# Patient Record
Sex: FEMALE | Race: WHITE | Employment: OTHER | URBAN - METROPOLITAN AREA
[De-identification: names, ages, dates, MRNs, and addresses within clinical notes are randomized per-mention and may not be internally consistent; named-entity substitution may affect disease eponyms.]

---

## 2021-04-18 ENCOUNTER — HOSPITAL ENCOUNTER (INPATIENT)
Age: 86
LOS: 7 days | Discharge: HOME | DRG: 377 | End: 2021-04-25
Attending: EMERGENCY MEDICINE | Admitting: INTERNAL MEDICINE
Payer: MEDICARE

## 2021-04-18 ENCOUNTER — APPOINTMENT (OUTPATIENT)
Dept: CT IMAGING | Age: 86
DRG: 377 | End: 2021-04-18
Attending: EMERGENCY MEDICINE
Payer: MEDICARE

## 2021-04-18 DIAGNOSIS — D64.9 ANEMIA, UNSPECIFIED TYPE: ICD-10-CM

## 2021-04-18 DIAGNOSIS — I44.1 AV BLOCK, 2ND DEGREE: ICD-10-CM

## 2021-04-18 DIAGNOSIS — K92.0 HEMATEMESIS, PRESENCE OF NAUSEA NOT SPECIFIED: ICD-10-CM

## 2021-04-18 DIAGNOSIS — I10 HTN (HYPERTENSION), BENIGN: ICD-10-CM

## 2021-04-18 DIAGNOSIS — K92.2 UPPER GI BLEED: Primary | ICD-10-CM

## 2021-04-18 PROBLEM — M19.90 ARTHRITIS: Status: ACTIVE | Noted: 2021-04-18

## 2021-04-18 PROBLEM — R63.6 UNDERWEIGHT: Status: ACTIVE | Noted: 2021-04-18

## 2021-04-18 PROBLEM — E03.9 HYPOTHYROIDISM: Status: ACTIVE | Noted: 2021-04-18

## 2021-04-18 LAB
ALBUMIN SERPL-MCNC: 2.1 G/DL (ref 3.5–5)
ALBUMIN/GLOB SERPL: 0.8 {RATIO} (ref 1.1–2.2)
ALP SERPL-CCNC: 87 U/L (ref 45–117)
ALT SERPL-CCNC: 15 U/L (ref 12–78)
ANION GAP SERPL CALC-SCNC: 3 MMOL/L (ref 5–15)
APTT PPP: 23.4 SEC (ref 22.1–31)
AST SERPL-CCNC: 12 U/L (ref 15–37)
BASOPHILS # BLD: 0 K/UL (ref 0–0.1)
BASOPHILS NFR BLD: 0 % (ref 0–1)
BILIRUB SERPL-MCNC: 0.2 MG/DL (ref 0.2–1)
BUN SERPL-MCNC: 37 MG/DL (ref 6–20)
BUN/CREAT SERPL: 41 (ref 12–20)
CALCIUM SERPL-MCNC: 7.6 MG/DL (ref 8.5–10.1)
CHLORIDE SERPL-SCNC: 107 MMOL/L (ref 97–108)
CO2 SERPL-SCNC: 29 MMOL/L (ref 21–32)
COMMENT, HOLDF: NORMAL
CREAT SERPL-MCNC: 0.91 MG/DL (ref 0.55–1.02)
DIFFERENTIAL METHOD BLD: ABNORMAL
EOSINOPHIL # BLD: 0 K/UL (ref 0–0.4)
EOSINOPHIL NFR BLD: 0 % (ref 0–7)
ERYTHROCYTE [DISTWIDTH] IN BLOOD BY AUTOMATED COUNT: 14.8 % (ref 11.5–14.5)
FOLATE SERPL-MCNC: 6.8 NG/ML (ref 5–21)
GLOBULIN SER CALC-MCNC: 2.8 G/DL (ref 2–4)
GLUCOSE SERPL-MCNC: 134 MG/DL (ref 65–100)
HCT VFR BLD AUTO: 19.8 % (ref 35–47)
HCT VFR BLD AUTO: 26.3 % (ref 35–47)
HEMOCCULT STL QL: NEGATIVE
HGB BLD-MCNC: 6.1 G/DL (ref 11.5–16)
HGB BLD-MCNC: 8 G/DL (ref 11.5–16)
HISTORY CHECKED?,CKHIST: NORMAL
IMM GRANULOCYTES # BLD AUTO: 0.1 K/UL (ref 0–0.04)
IMM GRANULOCYTES NFR BLD AUTO: 1 % (ref 0–0.5)
INR PPP: 1.1 (ref 0.9–1.1)
IRON SATN MFR SERPL: 11 % (ref 20–50)
IRON SERPL-MCNC: 24 UG/DL (ref 35–150)
LYMPHOCYTES # BLD: 0.7 K/UL (ref 0.8–3.5)
LYMPHOCYTES NFR BLD: 6 % (ref 12–49)
MCH RBC QN AUTO: 27.3 PG (ref 26–34)
MCHC RBC AUTO-ENTMCNC: 30.4 G/DL (ref 30–36.5)
MCV RBC AUTO: 89.8 FL (ref 80–99)
MONOCYTES # BLD: 0.5 K/UL (ref 0–1)
MONOCYTES NFR BLD: 4 % (ref 5–13)
NEUTS SEG # BLD: 11.1 K/UL (ref 1.8–8)
NEUTS SEG NFR BLD: 89 % (ref 32–75)
NRBC # BLD: 0 K/UL (ref 0–0.01)
NRBC BLD-RTO: 0 PER 100 WBC
PLATELET # BLD AUTO: 371 K/UL (ref 150–400)
PMV BLD AUTO: 8.4 FL (ref 8.9–12.9)
POTASSIUM SERPL-SCNC: 4.6 MMOL/L (ref 3.5–5.1)
PROT SERPL-MCNC: 4.9 G/DL (ref 6.4–8.2)
PROTHROMBIN TIME: 11.8 SEC (ref 9–11.1)
RBC # BLD AUTO: 2.93 M/UL (ref 3.8–5.2)
RBC MORPH BLD: ABNORMAL
SAMPLES BEING HELD,HOLD: NORMAL
SODIUM SERPL-SCNC: 139 MMOL/L (ref 136–145)
THERAPEUTIC RANGE,PTTT: NORMAL SECS (ref 58–77)
TIBC SERPL-MCNC: 226 UG/DL (ref 250–450)
TROPONIN I SERPL-MCNC: <0.05 NG/ML
TSH SERPL DL<=0.05 MIU/L-ACNC: 1.93 UIU/ML (ref 0.36–3.74)
VIT B12 SERPL-MCNC: 431 PG/ML (ref 193–986)
WBC # BLD AUTO: 12.4 K/UL (ref 3.6–11)

## 2021-04-18 PROCEDURE — 65660000000 HC RM CCU STEPDOWN

## 2021-04-18 PROCEDURE — 96374 THER/PROPH/DIAG INJ IV PUSH: CPT

## 2021-04-18 PROCEDURE — 99285 EMERGENCY DEPT VISIT HI MDM: CPT

## 2021-04-18 PROCEDURE — 86901 BLOOD TYPING SEROLOGIC RH(D): CPT

## 2021-04-18 PROCEDURE — C9113 INJ PANTOPRAZOLE SODIUM, VIA: HCPCS | Performed by: EMERGENCY MEDICINE

## 2021-04-18 PROCEDURE — 93005 ELECTROCARDIOGRAM TRACING: CPT

## 2021-04-18 PROCEDURE — 74011250636 HC RX REV CODE- 250/636: Performed by: EMERGENCY MEDICINE

## 2021-04-18 PROCEDURE — 84484 ASSAY OF TROPONIN QUANT: CPT

## 2021-04-18 PROCEDURE — 82746 ASSAY OF FOLIC ACID SERUM: CPT

## 2021-04-18 PROCEDURE — 85025 COMPLETE CBC W/AUTO DIFF WBC: CPT

## 2021-04-18 PROCEDURE — 77030038269 HC DRN EXT URIN PURWCK BARD -A

## 2021-04-18 PROCEDURE — 82272 OCCULT BLD FECES 1-3 TESTS: CPT

## 2021-04-18 PROCEDURE — 86923 COMPATIBILITY TEST ELECTRIC: CPT

## 2021-04-18 PROCEDURE — 85610 PROTHROMBIN TIME: CPT

## 2021-04-18 PROCEDURE — 81001 URINALYSIS AUTO W/SCOPE: CPT

## 2021-04-18 PROCEDURE — 80053 COMPREHEN METABOLIC PANEL: CPT

## 2021-04-18 PROCEDURE — 74011250636 HC RX REV CODE- 250/636: Performed by: INTERNAL MEDICINE

## 2021-04-18 PROCEDURE — 83540 ASSAY OF IRON: CPT

## 2021-04-18 PROCEDURE — 85730 THROMBOPLASTIN TIME PARTIAL: CPT

## 2021-04-18 PROCEDURE — 36415 COLL VENOUS BLD VENIPUNCTURE: CPT

## 2021-04-18 PROCEDURE — 85018 HEMOGLOBIN: CPT

## 2021-04-18 PROCEDURE — 82607 VITAMIN B-12: CPT

## 2021-04-18 PROCEDURE — C9113 INJ PANTOPRAZOLE SODIUM, VIA: HCPCS | Performed by: INTERNAL MEDICINE

## 2021-04-18 PROCEDURE — 84443 ASSAY THYROID STIM HORMONE: CPT

## 2021-04-18 PROCEDURE — 74177 CT ABD & PELVIS W/CONTRAST: CPT

## 2021-04-18 PROCEDURE — 74011000636 HC RX REV CODE- 636: Performed by: EMERGENCY MEDICINE

## 2021-04-18 PROCEDURE — 65270000029 HC RM PRIVATE

## 2021-04-18 RX ORDER — POLYETHYLENE GLYCOL 3350 17 G/17G
17 POWDER, FOR SOLUTION ORAL DAILY PRN
Status: DISCONTINUED | OUTPATIENT
Start: 2021-04-18 | End: 2021-04-25 | Stop reason: HOSPADM

## 2021-04-18 RX ORDER — SENNOSIDES 8.6 MG/1
1 TABLET ORAL DAILY PRN
Status: DISCONTINUED | OUTPATIENT
Start: 2021-04-18 | End: 2021-04-25 | Stop reason: HOSPADM

## 2021-04-18 RX ORDER — SODIUM CHLORIDE 9 MG/ML
250 INJECTION, SOLUTION INTRAVENOUS AS NEEDED
Status: DISCONTINUED | OUTPATIENT
Start: 2021-04-18 | End: 2021-04-25 | Stop reason: HOSPADM

## 2021-04-18 RX ORDER — ASPIRIN 81 MG/1
81 TABLET ORAL DAILY
COMMUNITY
End: 2021-04-25

## 2021-04-18 RX ORDER — SODIUM CHLORIDE 0.9 % (FLUSH) 0.9 %
5-40 SYRINGE (ML) INJECTION EVERY 8 HOURS
Status: DISCONTINUED | OUTPATIENT
Start: 2021-04-18 | End: 2021-04-25 | Stop reason: HOSPADM

## 2021-04-18 RX ORDER — PANTOPRAZOLE SODIUM 40 MG/10ML
40 INJECTION, POWDER, LYOPHILIZED, FOR SOLUTION INTRAVENOUS
Status: COMPLETED | OUTPATIENT
Start: 2021-04-18 | End: 2021-04-18

## 2021-04-18 RX ORDER — LEVOTHYROXINE SODIUM 50 UG/1
50 TABLET ORAL
Status: ON HOLD | COMMUNITY
End: 2021-04-23 | Stop reason: SDUPTHER

## 2021-04-18 RX ORDER — SODIUM CHLORIDE 0.9 % (FLUSH) 0.9 %
5-40 SYRINGE (ML) INJECTION AS NEEDED
Status: DISCONTINUED | OUTPATIENT
Start: 2021-04-18 | End: 2021-04-25 | Stop reason: HOSPADM

## 2021-04-18 RX ORDER — ONDANSETRON 2 MG/ML
4 INJECTION INTRAMUSCULAR; INTRAVENOUS
Status: COMPLETED | OUTPATIENT
Start: 2021-04-18 | End: 2021-04-18

## 2021-04-18 RX ORDER — ACETAMINOPHEN 650 MG/1
650 SUPPOSITORY RECTAL
Status: DISCONTINUED | OUTPATIENT
Start: 2021-04-18 | End: 2021-04-25 | Stop reason: HOSPADM

## 2021-04-18 RX ORDER — ACETAMINOPHEN 325 MG/1
650 TABLET ORAL
Status: DISCONTINUED | OUTPATIENT
Start: 2021-04-18 | End: 2021-04-25 | Stop reason: HOSPADM

## 2021-04-18 RX ORDER — DICLOFENAC POTASSIUM 50 MG/1
50 TABLET, FILM COATED ORAL DAILY
COMMUNITY
End: 2021-04-18

## 2021-04-18 RX ORDER — AMLODIPINE BESYLATE 5 MG/1
5 TABLET ORAL DAILY
COMMUNITY
End: 2021-04-25

## 2021-04-18 RX ORDER — PROMETHAZINE HYDROCHLORIDE 25 MG/1
12.5 TABLET ORAL
Status: DISCONTINUED | OUTPATIENT
Start: 2021-04-18 | End: 2021-04-25 | Stop reason: HOSPADM

## 2021-04-18 RX ORDER — ONDANSETRON 2 MG/ML
4 INJECTION INTRAMUSCULAR; INTRAVENOUS
Status: DISCONTINUED | OUTPATIENT
Start: 2021-04-18 | End: 2021-04-25 | Stop reason: HOSPADM

## 2021-04-18 RX ORDER — DICLOFENAC SODIUM 50 MG/1
50 TABLET, DELAYED RELEASE ORAL DAILY
COMMUNITY
End: 2021-04-25

## 2021-04-18 RX ADMIN — ONDANSETRON 4 MG: 2 INJECTION INTRAMUSCULAR; INTRAVENOUS at 14:04

## 2021-04-18 RX ADMIN — SODIUM CHLORIDE 40 MG: 9 INJECTION INTRAMUSCULAR; INTRAVENOUS; SUBCUTANEOUS at 21:34

## 2021-04-18 RX ADMIN — IOPAMIDOL 100 ML: 755 INJECTION, SOLUTION INTRAVENOUS at 12:14

## 2021-04-18 RX ADMIN — PANTOPRAZOLE SODIUM 40 MG: 40 INJECTION, POWDER, FOR SOLUTION INTRAVENOUS at 12:48

## 2021-04-18 RX ADMIN — SODIUM CHLORIDE 500 ML: 9 INJECTION, SOLUTION INTRAVENOUS at 11:32

## 2021-04-18 RX ADMIN — Medication 10 ML: at 21:35

## 2021-04-18 NOTE — H&P
[facility]  [address], [facility]  [phone]    Admission History and Physical      NAME:       Juju Goodwin   :       1930   MRN:      [id number]     PCP:      Yoni Mata MD     Date of service:   2021     Chief  Complaint:  Coffee ground emesis     History Of Presenting Illness:       Ms. Chinedu Zarate is a 80 y.o. female who is being admitted for Acute GI bleeding. Ms. Chinedu Zarate presented to our Emergency Department today complaining of persistent episodes of coffee ground emesis. She is a poor historian and so I discussed with her son Chai Smith for collaborative Hx. She has a hx of arthritis for which she has been taking NSAIDs. It is unclear if she has ever had an EGD or colonoscopy as she just recently moved here from [place] to stay with her family. In the ED, she was found to be frail Denies much abdominal pain. No cough or SOB. No fever or chills. CT scan abdomen and pelvis showed gastric distention. She will be admitted for further management. Allergies   Allergen Reactions    Pcn [Penicillins] Unknown (comments)    Statins-Hmg-Coa Reductase Inhibitors Unknown (comments)    Warfarin Unknown (comments)       Prior to Admission medications    Medication Sig Start Date End Date Taking? Authorizing Provider   amLODIPine (NORVASC) 5 mg tablet Take 5 mg by mouth daily. Yes Vania, MD Haven   diclofenac potassium (CATAFLAM) 50 mg tablet Take 50 mg by mouth daily. Yes Vania, MD Haven   levothyroxine sodium (LEVOTHYROXINE PO) Take 50 mcg by mouth Daily (before breakfast). Yes Vania, MD Haven   aspirin delayed-release 81 mg tablet Take 81 mg by mouth daily.    Yes Vania, MD Haven       Past Medical History:   Diagnosis Date    Arthritis     Hypertension     Hypothyroidism         Past Surgical History:   Procedure Laterality Date    HX CHOLECYSTECTOMY      HX GYN      partial hysterectomy, uterine prolapse    HX HEENT      cataract    HX ORTHOPAEDIC Right     knee replacement       Social History     Tobacco Use    Smoking status: Never Smoker   Substance Use Topics    Alcohol use: Not Currently        Family History   Problem Relation Age of Onset    Heart Disease Neg Hx         Review of Systems: as per family    Constitutional ROS: no fever, chills, rigors or night sweats  Respiratory ROS: no cough, sputum, hemoptysis or pleuritic pain. Cardiovascular ROS: no chest pain, palpitations, orthopnea, PND or syncope  Endocrine ROS: no polydispsia, polyuria, heat or cold intolerance or major weight change. Gastrointestinal ROS: no dysphagia, abdominal pain but nausea, vomiting     Genito-Urinary ROS: no dysuria, frequency, hematuria, retention or flank pain  Musculoskeletal ROS: no swelling or muscular tenderness  Neurological ROS: no headache, confusion, focal weakness   Psychiatric ROS: no depression, anxiety, mood swings  Dermatological ROS: no rash, pruritis, or urticaria  Heme-Lymph ROS: no swollen glands, bleeding    Examination:    Constitutional:    Visit Vitals  /83   Pulse (!) 105   Temp 97.4 °F (36.3 °C)   Resp 22   Ht 4' 8\" (1.422 m)   Wt 36.3 kg (80 lb)   SpO2 96%   BMI 17.94 kg/m²         General:  Weak, cachectic looking elderly patient, in no acute distress  Eyes: Pale conjunctivae, PERRLA with no discharge. Normal eye movements  Ear, Nose, Mouth & Throat: No ottorrhea, rhinorrhea, non tender sinuses, dry mucous membranes  Respiratory:  No accessory muscle use, clear breath sounds without crackles or wheezes  Cardiovascular:  No JVD or murmurs, regular and normal S1, S2 without thrills, bruits. + peripheral edema. GI & :  Soft abdomen, non-tender, non-distended, normoactive bowel sounds with no palpable organomegaly  Heme:  No cervical or axillary adenopathy.    Musculoskeletal:  No cyanosis, clubbing but generalized muscular atrophy   Skin:  No rashes, bruising or ulcers   Neurological: Awake and alert, speech is clear but slow. Generally frail but no focal deficits   Psychiatric:  Has little insight to her illness   ________________________________________________________________________    Data Review:    Labs:    Recent Labs     04/18/21  1126   WBC 12.4*   HGB 8.0*   HCT 26.3*        Recent Labs     04/18/21  1126      K 4.6      CO2 29   *   BUN 37*   CREA 0.91   CA 7.6*   ALB 2.1*   ALT 15     No components found for: GLPOC  No results for input(s): PH, PCO2, PO2, HCO3, FIO2 in the last 72 hours. Recent Labs     04/18/21  1126   INR 1.1       Imaging Studies:      CT scan abdomen and pelvis -  Reviewed     I have also reviewed available old medical records. Assessment & Impression:     Ms. Chinedu Zarate is a 80 y.o. female being evaluated for:     Principal Problem:    Acute GI bleeding (4/18/2021)    Active Problems:    Coffee ground emesis (4/18/2021)      HTN (hypertension), benign (4/18/2021)      Hypothyroidism (4/18/2021)      Arthritis (4/18/2021)      Underweight (4/18/2021)         Plan of management:    Acute GI bleeding (4/18/2021) / Coffee ground emesis (4/18/2021) POA: likely due to NSAIDs induced PUD. However, given her age, gastric distension noted on CT, there is concern for a gastric outlet obstruction vs neoplasm. Admit to hospital. Clears for now. IV fluids. Start IV PPi. Hold NSAIDs. Consult Gi for further testing. HTN (hypertension), benign (4/18/2021) POA: Hold BP medications for now given likely drop if she continued to bleed. IV Hydralazine PRN    Hypothyroidism (4/18/2021) POA: check a TSH. Resume Levothyroxine once verified    Anemia POA: unclear baseline. Likely due to blood loss. Check serologies. Monitor Hgb/Hct q8hr. Transfuse to keep it >7.  Son has given consent    Arthritis (4/18/2021) POA: Tylenol PRN    Underweight (4/18/2021) POA: likely from advanced age, poor intake vs other.  Diet as tolerated    Code Status:  Full    Surrogate decision maker: Family    Risk of deterioration: high      Total time spent for the care of the patient: [address] [facility] discussed with: Patient, Family, Nursing Staff and ED physician    Discussed:  Code Status, Care Plan and D/C Planning    Prophylaxis:  SCD's and H2B/PPI    Probable Disposition:  Home w/Family           ___________________________________________________    Attending Physician: Luke Mcgill MD

## 2021-04-18 NOTE — ROUTINE PROCESS
TRANSFER - OUT REPORT: 
 
Verbal report given to Jeremie Yakov (name) on [facility]  being transferred to [facility] (unit) for routine progression of care Report consisted of patients Situation, Background, Assessment and  
Recommendations(SBAR). Information from the following report(s) SBAR, ED Summary, MAR, Recent Results, Med Rec Status and Cardiac Rhythm a fib was reviewed with the receiving nurse. Lines:  
Peripheral IV 04/18/21 Left Forearm (Active) Peripheral IV 04/18/21 Right Forearm (Active) Site Assessment Clean, dry, & intact 04/18/21 1502 Phlebitis Assessment 0 04/18/21 1502 Infiltration Assessment 0 04/18/21 1502 Opportunity for questions and clarification was provided. Patient transported with: 
 [employer]

## 2021-04-18 NOTE — ED PROVIDER NOTES
Ms. Alba Mo is a 79yo female who presents to the ER with complaints of coffee-ground emesis. Per EMS, she recently moved from [place] to live with her family. Said having vomiting earlier today. She describes the vomit as being black and looking like coffee grounds. She does not know if she is on any blood thinners. She reports that she had some mild epigastric pain but that pain seems to resolve. No chest pain or trouble breathing. She said that she has some ongoing pain in her left foot, which has been chronic for her. She denies similar symptoms in the past.           No past medical history on file. No past surgical history on file. No family history on file.     Social History     Socioeconomic History    Marital status: Not on file     Spouse name: Not on file    Number of children: Not on file    Years of education: Not on file    Highest education level: Not on file   Occupational History    Not on file   Social Needs    Financial resource strain: Not on file    Food insecurity     Worry: Not on file     Inability: Not on file    Transportation needs     Medical: Not on file     Non-medical: Not on file   Tobacco Use    Smoking status: Not on file   Substance and Sexual Activity    Alcohol use: Not on file    Drug use: Not on file    Sexual activity: Not on file   Lifestyle    Physical activity     Days per week: Not on file     Minutes per session: Not on file    Stress: Not on file   Relationships    Social connections     Talks on phone: Not on file     Gets together: Not on file     Attends Muslim service: Not on file     Active member of club or organization: Not on file     Attends meetings of clubs or organizations: Not on file     Relationship status: Not on file    Intimate partner violence     Fear of current or ex partner: Not on file     Emotionally abused: Not on file     Physically abused: Not on file     Forced sexual activity: Not on file   Other Topics Concern    Not on file   Social History Narrative    Not on file         ALLERGIES: Pcn [penicillins]    Review of Systems   Constitutional: Negative for chills and fever. HENT: Negative for rhinorrhea and sore throat. Respiratory: Negative for cough and shortness of breath. Cardiovascular: Negative for chest pain. Gastrointestinal: Positive for abdominal pain and vomiting. Genitourinary: Negative for dysuria and urgency. Musculoskeletal:        Left foot pain   Skin: Negative for rash. Neurological: Negative for dizziness, weakness and light-headedness. Vitals:    04/18/21 1051 04/18/21 1056   BP: (!) 140/63    Pulse: 97    Resp: 20    Temp: 97.4 °F (36.3 °C)    SpO2:  97%   Weight: 36.3 kg (80 lb)    Height: 4' 8\" (1.422 m)             Physical Exam       Vital signs reviewed. Nursing notes reviewed. Const:  No acute distress, well developed, well nourished  Head:  Atraumatic, normocephalic  Eyes:  PERRL, conjunctiva normal, no scleral icterus  Neck:  Supple, trachea midline  Cardiovascular: Regular rate  Resp:  No resp distress, no increased work of breathing  Abd:  Soft, mild suprapubic tenderness, non-distended, no rebound, no guarding, no CVA tenderness  : Normal external genitalia, brown stool  MSK:  No pedal edema, normal ROM  Neuro:  Alert and awake, no cranial nerve defect  Skin:  Warm, dry, intact  Psych: normal mood and affect, behavior is normal, judgement and thought content is normal            MDM  Number of Diagnoses or Management Options     Amount and/or Complexity of Data Reviewed  Clinical lab tests: ordered and reviewed  Tests in the radiology section of CPT®: ordered and reviewed  Review and summarize past medical records: yes    Patient Progress  Patient progress: stable          Ms. Linus Capps is a 79yo female who presents to the ER with vomiting coffee ground emesis. Her hemoccult is negative, but she has continued to have the vomiting here.   Her Hg is 8, however, I do not have a baseline Hg. CT shows gastric distension but no other acute pathology. Pt. To be evaluated for admission by the hospitalist.        [address] [facility] for Admission  1:01 PM    ED Room Number: ER09/09  Patient Name and age:  Martha Winters 80 y.o.  female  Working Diagnosis:   1. Upper GI bleed    2. Hematemesis, presence of nausea not specified    3.  Anemia, unspecified type        COVID-19 Suspicion:  no  Sepsis present:  no  Reassessment needed: no  Code Status:  Full Code  Readmission: no  Isolation Requirements:  no  Recommended Level of Care:  telemetry  Department:[facility] ED - [phone]        Procedures

## 2021-04-18 NOTE — PROGRESS NOTES
[facility]: MED RECONCILIATION    Comments/Recommendations:   Medication reconciliation completed by son who had medication list to review. Patient has not had any medications today prior to hospital arrival.   Confirmed preferred pharmacy as [facility] and [address] [place] [address]. Medications added:     none    Medications removed:    none    Medications adjusted:    none    Information obtained from: son, medication list       Allergies: Pcn [penicillins], Statins-hmg-coa reductase inhibitors, and Warfarin    Prior to Admission Medications:     Medication Documentation Review Audit       Reviewed by Yolanda Morgan (Pharmacist) on 04/18/21 at 1522      Medication Sig Documenting Provider Last Dose Status Taking? amLODIPine (NORVASC) 5 mg tablet Take 5 mg by mouth daily. OtherHaven MD 4/17/2021 AM Active Yes   aspirin delayed-release 81 mg tablet Take 81 mg by mouth daily. Haven Caro MD 4/17/2021 AM Active Yes   diclofenac EC (VOLTAREN) 50 mg EC tablet Take 50 mg by mouth daily. Provider, Historical 4/17/2021 AM Active Yes   levothyroxine (SYNTHROID) 50 mcg tablet Take 50 mcg by mouth Daily (before breakfast).  Haven Caro MD 4/17/2021 AM Active Yes                    Thank you,   Yolanda Ceja, PharmD, BCPS   Contact: [phone]

## 2021-04-18 NOTE — CONSULTS
[facility]. Herlinda Yap MD  [phone] office  [phone] voicemail   Gastroenterology Consultation Note      Admit Date: 4/18/2021  Consult Date: 4/18/2021   I appreciate your asking me to see Rhianna Woodruff, thank you very much for the opportunity to participate in her care. Narrative Assessment and Plan   Low-grade UGI bleeding in the form of CGE. HD stable. DDx: erosive esophagitis, PUD, AVMs, neoplasm. See plan below    Subjective:     Chief Complaint: Vomiting  Coffee ground emesis    History of Present Illness:   79 y/o woman who presented to ED for coffee-ground emesis for the past few days. Pt is a poor historian, and history was obtained from the chart. No h/o mireya red blood in vomiting. She denies any abdominal pain, and is currently not nauseous. No prior EGD. Recent h/o NSAID use is present to treat arthritic pain. CT scan abdomen and pelvis showed gastric distention. PCP:  Vania, MD Haven    Past Medical History:   Diagnosis Date    Arthritis     Hypertension     Hypothyroidism         Past Surgical History:   Procedure Laterality Date    HX CHOLECYSTECTOMY      HX GYN      partial hysterectomy, uterine prolapse    HX HEENT      cataract    HX ORTHOPAEDIC Right     knee replacement       Social History     Tobacco Use    Smoking status: Never Smoker   Substance Use Topics    Alcohol use: Not Currently        Family History   Problem Relation Age of Onset    Heart Disease Neg Hx         Allergies   Allergen Reactions    Pcn [Penicillins] Unknown (comments)    Statins-Hmg-Coa Reductase Inhibitors Unknown (comments)    Warfarin Unknown (comments)            Home Medications:  Prior to Admission Medications   Prescriptions Last Dose Informant Patient Reported? Taking? amLODIPine (NORVASC) 5 mg tablet 4/17/2021 at AM Child Yes Yes   Sig: Take 5 mg by mouth daily.    aspirin delayed-release 81 mg tablet 4/17/2021 at AM Child Yes Yes   Sig: Take 81 mg by mouth daily. diclofenac EC (VOLTAREN) 50 mg EC tablet 4/17/2021 at AM Child Yes Yes   Sig: Take 50 mg by mouth daily. levothyroxine (SYNTHROID) 50 mcg tablet 4/17/2021 at AM Child Yes Yes   Sig: Take 50 mcg by mouth Daily (before breakfast). Facility-Administered Medications: None       Hospital Medications:  Current Facility-Administered Medications   Medication Dose Route Frequency    sodium chloride (NS) flush 5-40 mL  5-40 mL IntraVENous Q8H    sodium chloride (NS) flush 5-40 mL  5-40 mL IntraVENous PRN    acetaminophen (TYLENOL) tablet 650 mg  650 mg Oral Q6H PRN    Or    acetaminophen (TYLENOL) suppository 650 mg  650 mg Rectal Q6H PRN    polyethylene glycol (MIRALAX) packet 17 g  17 g Oral DAILY PRN    senna (SENOKOT) tablet 8.6 mg  1 Tab Oral DAILY PRN    promethazine (PHENERGAN) tablet 12.5 mg  12.5 mg Oral Q6H PRN    Or    ondansetron (ZOFRAN) injection 4 mg  4 mg IntraVENous Q6H PRN    pantoprazole (PROTONIX) 40 mg in 0.9% sodium chloride 10 mL injection  40 mg IntraVENous Q12H       Review of Systems: Admission ROS by Luis A Alberts MD from 4/18/2021 were reviewed with the patient and changes (other than per HPI) include: none      Objective:     Physical Exam:  Visit Vitals  /68   Pulse 94   Temp 98 °F (36.7 °C)   Resp 16   Ht 4' 8\" (1.422 m)   Wt 36.3 kg (80 lb)   SpO2 95%   BMI 17.94 kg/m²     SpO2 Readings from Last 6 Encounters:   04/18/21 95%        No intake or output data in the 24 hours ending 04/18/21 9522   General: no distress, comfortable. Thin built. Frail. No acute distress.    Skin:  No rash or palpable dermatologic mass lesions  HEENT: Pupils equal, sclera anicteric, oropharynx with no gross lesions  Cardiovascular: No abnormal audible heart sounds, well perfused, no edema  Respiratory:  No abnormal audible breath sounds, normal respiratory effort, no throacic deformity  GI:  Soft abdomen, Abdomen nondistended, nontender, no mass, no free fluid, no rebound or guarding. Neurological:  Motor intact in upper extremeties      Laboratory:    Recent Results (from the past 24 hour(s))   SAMPLES BEING HELD    Collection Time: 04/18/21 11:26 AM   Result Value Ref Range    SAMPLES BEING HELD 1RED,1SST,1DRK GRN     COMMENT        Add-on orders for these samples will be processed based on acceptable specimen integrity and analyte stability, which may vary by analyte. METABOLIC PANEL, COMPREHENSIVE    Collection Time: 04/18/21 11:26 AM   Result Value Ref Range    Sodium 139 136 - 145 mmol/L    Potassium 4.6 3.5 - 5.1 mmol/L    Chloride 107 97 - 108 mmol/L    CO2 29 21 - 32 mmol/L    Anion gap 3 (L) 5 - 15 mmol/L    Glucose 134 (H) 65 - 100 mg/dL    BUN 37 (H) 6 - 20 MG/DL    Creatinine 0.91 0.55 - 1.02 MG/DL    BUN/Creatinine ratio 41 (H) 12 - 20      GFR est AA >60 >60 ml/min/1.73m2    GFR est non-AA 58 (L) >60 ml/min/1.73m2    Calcium 7.6 (L) 8.5 - 10.1 MG/DL    Bilirubin, total 0.2 0.2 - 1.0 MG/DL    ALT (SGPT) 15 12 - 78 U/L    AST (SGOT) 12 (L) 15 - 37 U/L    Alk. phosphatase 87 45 - 117 U/L    Protein, total 4.9 (L) 6.4 - 8.2 g/dL    Albumin 2.1 (L) 3.5 - 5.0 g/dL    Globulin 2.8 2.0 - 4.0 g/dL    A-G Ratio 0.8 (L) 1.1 - 2.2     CBC WITH AUTOMATED DIFF    Collection Time: 04/18/21 11:26 AM   Result Value Ref Range    WBC 12.4 (H) 3.6 - 11.0 K/uL    RBC 2.93 (L) 3.80 - 5.20 M/uL    HGB 8.0 (L) 11.5 - 16.0 g/dL    HCT 26.3 (L) 35.0 - 47.0 %    MCV 89.8 80.0 - 99.0 FL    MCH 27.3 26.0 - 34.0 PG    MCHC 30.4 30.0 - 36.5 g/dL    RDW 14.8 (H) 11.5 - 14.5 %    PLATELET 662 441 - 125 K/uL    MPV 8.4 (L) 8.9 - 12.9 FL    NRBC 0.0 0  WBC    ABSOLUTE NRBC 0.00 0.00 - 0.01 K/uL    NEUTROPHILS 89 (H) 32 - 75 %    LYMPHOCYTES 6 (L) 12 - 49 %    MONOCYTES 4 (L) 5 - 13 %    EOSINOPHILS 0 0 - 7 %    BASOPHILS 0 0 - 1 %    IMMATURE GRANULOCYTES 1 (H) 0.0 - 0.5 %    ABS. NEUTROPHILS 11.1 (H) 1.8 - 8.0 K/UL    ABS. LYMPHOCYTES 0.7 (L) 0.8 - 3.5 K/UL    ABS.  MONOCYTES 0.5 0.0 - 1.0 K/UL ABS. EOSINOPHILS 0.0 0.0 - 0.4 K/UL    ABS. BASOPHILS 0.0 0.0 - 0.1 K/UL    ABS. IMM. GRANS. 0.1 (H) 0.00 - 0.04 K/UL    DF SMEAR SCANNED      RBC COMMENTS HYPOCHROMIA  1+        RBC COMMENTS IAN CELLS  PRESENT        RBC COMMENTS RBC FRAGMENTS     PTT    Collection Time: 04/18/21 11:26 AM   Result Value Ref Range    aPTT 23.4 22.1 - 31.0 sec    aPTT, therapeutic range     58.0 - 77.0 SECS   PROTHROMBIN TIME + INR    Collection Time: 04/18/21 11:26 AM   Result Value Ref Range    INR 1.1 0.9 - 1.1      Prothrombin time 11.8 (H) 9.0 - 11.1 sec   TROPONIN I    Collection Time: 04/18/21 11:26 AM   Result Value Ref Range    Troponin-I, Qt. <0.05 <0.05 ng/mL   OCCULT BLOOD, STOOL    Collection Time: 04/18/21 11:26 AM   Result Value Ref Range    Occult blood, stool Negative NEG     TSH 3RD GENERATION    Collection Time: 04/18/21 11:26 AM   Result Value Ref Range    TSH 1.93 0.36 - 3.74 uIU/mL   IRON PROFILE    Collection Time: 04/18/21 11:26 AM   Result Value Ref Range    Iron 24 (L) 35 - 150 ug/dL    TIBC 226 (L) 250 - 450 ug/dL    Iron % saturation 11 (L) 20 - 50 %   VITAMIN B12    Collection Time: 04/18/21 11:26 AM   Result Value Ref Range    Vitamin B12 431 193 - 986 pg/mL   FOLATE    Collection Time: 04/18/21 11:26 AM   Result Value Ref Range    Folate 6.8 5.0 - 21.0 ng/mL         Assessment/Plan:     Principal Problem:    Acute GI bleeding (4/18/2021)    Active Problems:    Coffee ground emesis (4/18/2021)      HTN (hypertension), benign (4/18/2021)      Hypothyroidism (4/18/2021)      Arthritis (4/18/2021)      Underweight (4/18/2021)       Low-grade UGI bleeding in the form of CGE. HD stable. DDx: erosive esophagitis, PUD, AVMs, neoplasm. Normocytic anemia, without baseline for comparison. Iron studies suggestive of anemia related to chronic disease  Does not appear to be bleeding actively. No emergent need for endoscopy. PLAN:  - IV PPI  - CLD  - Monitor clinical progress.  EGD can be considered if there is concern for ongoing GI bleeding. Risks related to advanced age and frailty will need to be weighed against potential benefit.

## 2021-04-18 NOTE — ACP (ADVANCE CARE PLANNING)
Advance Care Planning Note    Name:                 Lisset Calloway  YOB: 1930  MRN:                  [id number]  Admission Date: 4/18/2021    Date of service: 4/18/2021    Active Diagnoses:    Principal Problem:    Acute GI bleeding (4/18/2021)    Coffee ground emesis (4/18/2021)    HTN (hypertension), benign (4/18/2021)    Hypothyroidism (4/18/2021)    Arthritis (4/18/2021)    Underweight (4/18/2021)    These active diagnoses are of sufficient risk that focused discussion on advance care planning is indicated in order to allow the patient to thoughtfully consider personal goals of care, and if situations arise that prevent the ability to personally give input, to ensure appropriate representation of their personal desires for different levels and aggressiveness of care. Discussion:     Persons present and participating in discussion: Lisset Calloway, Son Lesley Brush, Margarito Ferguson MD    Discussion: Given the patient's frail nature and current active issues needing admission to hospital and potential to worsen, I did discuss with the patient's son about her advanced medical directives. He confirmed that she would want attempts for restorative therapy and should she code, she would prefer CPR, intubations and shocking. However, in the even that this does happen, she would not prefer to be on supportive care for a prolonged period of time in which instance, Mr Carolynn Méndez and family would determine goals of care. She remains FULL code. Time Spent:     Total time spent face-to-face in education and discussion: 16 minutes.      Margarito Ferguson MD  4/18/2021  3:07 PM

## 2021-04-19 LAB
ALBUMIN SERPL-MCNC: 2 G/DL (ref 3.5–5)
ALBUMIN/GLOB SERPL: 0.9 {RATIO} (ref 1.1–2.2)
ALP SERPL-CCNC: 68 U/L (ref 45–117)
ALT SERPL-CCNC: 12 U/L (ref 12–78)
ANION GAP SERPL CALC-SCNC: 4 MMOL/L (ref 5–15)
APPEARANCE UR: ABNORMAL
AST SERPL-CCNC: 13 U/L (ref 15–37)
ATRIAL RATE: 104 BPM
BACTERIA URNS QL MICRO: ABNORMAL /HPF
BILIRUB SERPL-MCNC: 0.4 MG/DL (ref 0.2–1)
BILIRUB UR QL: NEGATIVE
BUN SERPL-MCNC: 30 MG/DL (ref 6–20)
BUN/CREAT SERPL: 48 (ref 12–20)
CALCIUM SERPL-MCNC: 7.9 MG/DL (ref 8.5–10.1)
CALCULATED R AXIS, ECG10: -66 DEGREES
CALCULATED T AXIS, ECG11: 85 DEGREES
CHLORIDE SERPL-SCNC: 111 MMOL/L (ref 97–108)
CO2 SERPL-SCNC: 28 MMOL/L (ref 21–32)
COLOR UR: ABNORMAL
CREAT SERPL-MCNC: 0.62 MG/DL (ref 0.55–1.02)
DIAGNOSIS, 93000: NORMAL
EPITH CASTS URNS QL MICRO: ABNORMAL /LPF
GLOBULIN SER CALC-MCNC: 2.2 G/DL (ref 2–4)
GLUCOSE SERPL-MCNC: 76 MG/DL (ref 65–100)
GLUCOSE UR STRIP.AUTO-MCNC: NEGATIVE MG/DL
HCT VFR BLD AUTO: 28 % (ref 35–47)
HGB BLD-MCNC: 8.6 G/DL (ref 11.5–16)
HGB UR QL STRIP: NEGATIVE
KETONES UR QL STRIP.AUTO: NEGATIVE MG/DL
LEUKOCYTE ESTERASE UR QL STRIP.AUTO: ABNORMAL
NITRITE UR QL STRIP.AUTO: POSITIVE
PH UR STRIP: 5 [PH] (ref 5–8)
POTASSIUM SERPL-SCNC: 4.7 MMOL/L (ref 3.5–5.1)
PROT SERPL-MCNC: 4.2 G/DL (ref 6.4–8.2)
PROT UR STRIP-MCNC: NEGATIVE MG/DL
Q-T INTERVAL, ECG07: 356 MS
QRS DURATION, ECG06: 104 MS
QTC CALCULATION (BEZET), ECG08: 435 MS
RBC #/AREA URNS HPF: ABNORMAL /HPF (ref 0–5)
SODIUM SERPL-SCNC: 143 MMOL/L (ref 136–145)
SP GR UR REFRACTOMETRY: 1.01 (ref 1–1.03)
UROBILINOGEN UR QL STRIP.AUTO: 0.2 EU/DL (ref 0.2–1)
VENTRICULAR RATE, ECG03: 90 BPM
WBC URNS QL MICRO: ABNORMAL /HPF (ref 0–4)

## 2021-04-19 PROCEDURE — 97161 PT EVAL LOW COMPLEX 20 MIN: CPT

## 2021-04-19 PROCEDURE — 74011250636 HC RX REV CODE- 250/636: Performed by: INTERNAL MEDICINE

## 2021-04-19 PROCEDURE — 36415 COLL VENOUS BLD VENIPUNCTURE: CPT

## 2021-04-19 PROCEDURE — 36430 TRANSFUSION BLD/BLD COMPNT: CPT

## 2021-04-19 PROCEDURE — C9113 INJ PANTOPRAZOLE SODIUM, VIA: HCPCS | Performed by: INTERNAL MEDICINE

## 2021-04-19 PROCEDURE — 97535 SELF CARE MNGMENT TRAINING: CPT

## 2021-04-19 PROCEDURE — 85018 HEMOGLOBIN: CPT

## 2021-04-19 PROCEDURE — 74011000250 HC RX REV CODE- 250: Performed by: INTERNAL MEDICINE

## 2021-04-19 PROCEDURE — 97165 OT EVAL LOW COMPLEX 30 MIN: CPT

## 2021-04-19 PROCEDURE — P9016 RBC LEUKOCYTES REDUCED: HCPCS

## 2021-04-19 PROCEDURE — 97116 GAIT TRAINING THERAPY: CPT

## 2021-04-19 PROCEDURE — 30233N1 TRANSFUSION OF NONAUTOLOGOUS RED BLOOD CELLS INTO PERIPHERAL VEIN, PERCUTANEOUS APPROACH: ICD-10-PCS | Performed by: INTERNAL MEDICINE

## 2021-04-19 PROCEDURE — 2709999900 HC NON-CHARGEABLE SUPPLY

## 2021-04-19 PROCEDURE — 80053 COMPREHEN METABOLIC PANEL: CPT

## 2021-04-19 PROCEDURE — 65660000000 HC RM CCU STEPDOWN

## 2021-04-19 RX ORDER — CYANOCOBALAMIN 1000 UG/ML
1000 INJECTION, SOLUTION INTRAMUSCULAR; SUBCUTANEOUS ONCE
Status: COMPLETED | OUTPATIENT
Start: 2021-04-19 | End: 2021-04-19

## 2021-04-19 RX ADMIN — Medication 10 ML: at 12:50

## 2021-04-19 RX ADMIN — Medication 10 ML: at 05:04

## 2021-04-19 RX ADMIN — Medication 10 ML: at 21:08

## 2021-04-19 RX ADMIN — Medication 10 ML: at 16:29

## 2021-04-19 RX ADMIN — SODIUM CHLORIDE 40 MG: 9 INJECTION INTRAMUSCULAR; INTRAVENOUS; SUBCUTANEOUS at 21:08

## 2021-04-19 RX ADMIN — CYANOCOBALAMIN 1000 MCG: 1000 INJECTION, SOLUTION INTRAMUSCULAR; SUBCUTANEOUS at 12:50

## 2021-04-19 RX ADMIN — SODIUM CHLORIDE 40 MG: 9 INJECTION INTRAMUSCULAR; INTRAVENOUS; SUBCUTANEOUS at 09:30

## 2021-04-19 RX ADMIN — IRON SUCROSE 100 MG: 20 INJECTION, SOLUTION INTRAVENOUS at 12:50

## 2021-04-19 NOTE — PROGRESS NOTES
Comprehensive Nutrition Assessment      Type and Reason for Visit: Initial, Positive nutrition screen    Nutrition Recommendations/Plan:   1. Continue NPO- advance diet as able to GI lite, or Low fiber if appropriate. Consider regular if goals of care are for pt to have more choices. 2. Add Ensure Compact TID when diet starts    3. Monitor Plan of care. Daily weights. PO intake (when diet starts). Nutrition Assessment:       Pt admitted for Coffee ground emesis [K92.0]. Pt  has a past medical history of Arthritis, Hypertension, and Hypothyroidism. Pt screened 2/2 low BMI. No wt hx in chart. Pt does not seem to be a good historian, could not obtain detailed diet/wt hx from pt. Called family listed in demographics. No answer at this time. Pt is currently NPO. No reports of n/v currently. No abdominal pain. Last BM 4/17. NKFA. Pt with recent NSAID use for arthritis per reports. No reported visible blood loss. GI holding EGD until goals of care defined. No reported chew/swallow difficulties. Pt was on regular texture PTA. Recommend ONS TID Ensure compact of concentrated kcal/protein if pt agreeable. Monitor trial of Compact TID. Wt Readings from Last 10 Encounters:   04/18/21 36.3 kg (80 lb)       Malnutrition Assessment:  Malnutrition Status: At risk for malnutrition (specify)    Context:   elderly, underweight, NPO status, possible GI bleed. Estimated Daily Nutrient Needs:  Energy (kcal):  1265 (BMR x 1.2) +500  Protein (g):  44       Fluid (ml/day):  1250    Documented meal intake:   Patient Vitals for the past 168 hrs:   % Diet Eaten   04/19/21 0919 0   04/18/21 1851 1 - 25%       Documented Supplement intake:  Patient Vitals for the past 168 hrs:   Supplement intake %   04/19/21 0919 0       Nutrition Related Findings:      last BM 4/17- loose, 1+ edema BLE. Nutritionally Significant Medications:   Protonix. PRN: miralax, Zofran, senna.        Wounds:    None       Current Nutrition Therapies:  DIET NPO    Anthropometric Measures:  · Height:  4' 7.98\" (142.2 cm)  · Current Body Wt:  36.3 kg (80 lb 0.4 oz)   · Admission Body Wt:   36.3kg    · Usual Body Wt:   ?     · Ideal Body Wt:  80 lbs:  100 %   · BMI Category:  Underweight (BMI less than 22) age over 72       Nutrition Diagnosis:   · Inadequate protein-energy intake related to altered GI function as evidenced by NPO or clear liquid status due to medical condition(GI bleed)      Nutrition Interventions:   Food and/or Nutrient Delivery: Start oral diet, Start oral nutrition supplement  Nutrition Education and Counseling: No recommendations at this time  Coordination of Nutrition Care: Continue to monitor while inpatient, Interdisciplinary rounds    Goals:  Diet advancement and PO tolerance >25% of meals and 50% ONS within 2-4 days       Nutrition Monitoring and Evaluation:   Behavioral-Environmental Outcomes: Beliefs and attitudes  Food/Nutrient Intake Outcomes: Food and nutrient intake, Supplement intake  Physical Signs/Symptoms Outcomes: Biochemical data, Weight, Hemodynamic status    Discharge Planning:    Continue oral nutrition supplement, Continue current diet     Electronically signed by Carmen Kyle RD     Contact: [phone]

## 2021-04-19 NOTE — PROGRESS NOTES
Occupational Therapy:  04/19/21    Orders received, chart reviewed and patient evaluated by occupational therapy. Pending progression with skilled acute occupational therapy, recommend:  Occupational therapy at least 2 days/week in the home AND ensure assist and/or supervision for safety with all ADLs and mobility; If family unable to provide 24 hour care, pt will require SNF placement     Recommend with nursing ADLs with supervision/setup, OOB to chair 3x/day and toileting via functional mobility to and from bathroom with 1 assist and walker. Thank you for completing as able in order to maintain patient strength, endurance and independence. Full evaluation to follow.     Thank you,  Oniel Colby, OTR/L

## 2021-04-19 NOTE — PROGRESS NOTES
Bedside shift change report given to Joes Puentes (oncoming nurse) by Patt (offgoing nurse). Report included the following information SBAR, Kardex, MAR and Recent Results.

## 2021-04-19 NOTE — PROGRESS NOTES
Problem: Falls - Risk of  Goal: *Absence of Falls  Description: Document Yordy Hoyt Fall Risk and appropriate interventions in the flowsheet. Outcome: Progressing Towards Goal  Note: Fall Risk Interventions:  Mobility Interventions: Bed/chair exit alarm    Mentation Interventions: Bed/chair exit alarm    Medication Interventions: Bed/chair exit alarm    Elimination Interventions: Call light in reach              Problem: Pressure Injury - Risk of  Goal: *Prevention of pressure injury  Description: Document South Scale and appropriate interventions in the flowsheet. Outcome: Progressing Towards Goal  Note: Pressure Injury Interventions:  Sensory Interventions: Assess changes in LOC    Moisture Interventions: Absorbent underpads    Activity Interventions: Increase time out of bed    Mobility Interventions: Turn and reposition approx.  every two hours(pillow and wedges)    Nutrition Interventions: Document food/fluid/supplement intake    Friction and Shear Interventions: Minimize layers

## 2021-04-19 NOTE — PROGRESS NOTES
Problem: Mobility Impaired (Adult and Pediatric)  Goal: *Acute Goals and Plan of Care (Insert Text)  Description: FUNCTIONAL STATUS PRIOR TO ADMISSION: Pt is a questionable historian. Confused during questioning but reports today that prior to admission she was living with her son and daughter in law and used a rollator for mobility. Denies recent falls and  states that she has not left the house and requires assistance for ADLs    HOME SUPPORT PRIOR TO ADMISSION: The patient lived with son and daughter in law and required assistance for ADLs. Unclear assistance level needed for transfers and gait. Physical Therapy Goals  Initiated 4/19/2021  1. Patient will move from supine to sit and sit to supine  in bed with supervision/set-up within 7 day(s). 2.  Patient will transfer from bed to chair and chair to bed with supervision/set-up using the least restrictive device within 7 day(s). 3.  Patient will perform sit to stand with supervision/set-up within 7 day(s). 4.  Patient will ambulate with supervision/set-up for 50 feet with the least restrictive device within 7 day(s). 5.  Patient will ascend/descend 3 stairs with 1 handrail(s) with minimal assistance/contact guard assist within 7 day(s). Outcome: Progressing Towards Goal     PHYSICAL THERAPY EVALUATION  Patient: Sherri Carpenter (59 y.o. female)  Date: 4/19/2021  Primary Diagnosis: Coffee ground emesis [K92.0]        Precautions: Fall,       ASSESSMENT  Based on the objective data described below, the patient presents with significant confusion and decreased insight, decreased activity tolerance and presents at a high risk for falls. True baseline level of function is unclear, but pt reports that she was ambulating with a rollator and reports 5 falls in the past \"while using her crutches\" but denies recent falls. Currently, pt is requiring Standby assist for bed mobility and Min A for transfers and gait with RW.  She requires near constant cues for safety and directions during mobility and had several episodes of posterior LOB during gait requiring assist to correct. Acute PT will continue to follow pt while she remains in the acute setting. Rec SNF vs home with HHPT with 24/7 supervision/assistance required. Current Level of Function Impacting Discharge (mobility/balance): supervision for bed mobility, Min A for transfers and gait with RW    Functional Outcome Measure: The patient scored 35/100 on the Barthel Index outcome measure which is indicative of 65% functional impairment. Other factors to consider for discharge:      Patient will benefit from skilled therapy intervention to address the above noted impairments. PLAN :  Recommendations and Planned Interventions: bed mobility training, transfer training, gait training, therapeutic exercises, patient and family training/education, and therapeutic activities      Frequency/Duration: Patient will be followed by physical therapy:  5 times a week to address goals. Recommendation for discharge: (in order for the patient to meet his/her long term goals)  Physical therapy at least 2 days/week in the home AND ensure assist and/or supervision for safety with all mobility    This discharge recommendation:  Has been made in collaboration with the attending provider and/or case management    IF patient discharges home will need the following DME: to be determined (TBD)         SUBJECTIVE:   Patient stated I'm ok.     OBJECTIVE DATA SUMMARY:   HISTORY:    Past Medical History:   Diagnosis Date    Arthritis     Hypertension     Hypothyroidism      Past Surgical History:   Procedure Laterality Date    HX CHOLECYSTECTOMY      HX GYN      partial hysterectomy, uterine prolapse    HX HEENT      cataract    HX ORTHOPAEDIC Right     knee replacement       Personal factors and/or comorbidities impacting plan of care:     Home Situation  Home Environment: Private residence  # Steps to Enter: 4  Rails to Enter: Yes  Support Systems: Family member(s)  Current DME Used/Available at Home: [address] [facility], [place], straight, Walker, rollator, Walker, rolling    EXAMINATION/PRESENTATION/DECISION MAKING:   Critical Behavior:  Neurologic State: Alert  Orientation Level: Oriented to person, Oriented to place, Disoriented to situation, Disoriented to time  Cognition: Follows commands     Hearing:     Skin:  Defer to RN notes  Edema: Defer to RN notes  Range Of Motion:  AROM: Generally decreased, functional           PROM: Generally decreased, functional           Strength:    Strength: Generally decreased, functional                    Tone & Sensation:   Tone: Normal              Sensation: Intact               Coordination:  Coordination: Generally decreased, functional  Vision:      Functional Mobility:  Bed Mobility:     Supine to Sit: Stand-by assistance     Scooting: Stand-by assistance  Transfers:  Sit to Stand: Minimum assistance  Stand to Sit: Minimum assistance                       Balance:   Sitting: Intact  Standing: Impaired; With support  Standing - Static: Fair;Constant support  Standing - Dynamic : Fair;Occasional;Constant support  Ambulation/Gait Training:  Distance (ft): 18 Feet (ft)(x 2 reps)  Assistive Device: Walker, rolling;Gait belt  Ambulation - Level of Assistance: Minimal assistance     Gait Description (WDL): Exceptions to WDL  Gait Abnormalities: Shuffling gait; Path deviations        Base of Support: Narrowed     Speed/Lea: Shuffled; Slow         Functional Measure:  Barthel Index:    Bathin  Bladder: 0  Bowels: 5  Groomin  Dressin  Feedin  Mobility: 5  Stairs: 0  Toilet Use: 5  Transfer (Bed to Chair and Back): 10  Total: 35/100       The Barthel ADL Index: Guidelines  1. The index should be used as a record of what a patient does, not as a record of what a patient could do.   2. The main aim is to establish degree of independence from any help, physical or verbal, however minor and for whatever reason. 3. The need for supervision renders the patient not independent. 4. A patient's performance should be established using the best available evidence. Asking the patient, friends/relatives and nurses are the usual sources, but direct observation and common sense are also important. However direct testing is not needed. 5. Usually the patient's performance over the preceding 24-48 hours is important, but occasionally longer periods will be relevant. 6. Middle categories imply that the patient supplies over 50 per cent of the effort. 7. Use of aids to be independent is allowed. Jarrett Bunch., Barthel, D.WDominick ([id number]). Functional evaluation: the Barthel Index. [address] (14)2. Kit RUDI Atwood, Jose Peoples., Denise Stern., [place], [address] (1999). Measuring the change indisability after inpatient rehabilitation; comparison of the responsiveness of the Barthel Index and Functional [place] Measure. Journal of Neurology, Neurosurgery, and Psychiatry, 66(4), 142-718. Blair Ferrari N.J.ARNOLD, PHUC Jesus, & Aleksandar Montalvo M.A. (2004.) Assessment of post-stroke quality of life in cost-effectiveness studies: The usefulness of the Barthel Index and the EuroQoL-5D.  Quality of Life Research, 15, 289-23           Physical Therapy Evaluation Charge Determination   History Examination Presentation Decision-Making   MEDIUM  Complexity : 1-2 comorbidities / personal factors will impact the outcome/ POC  LOW Complexity : 1-2 Standardized tests and measures addressing body structure, function, activity limitation and / or participation in recreation  LOW Complexity : Stable, uncomplicated  LOW Complexity : FOTO score of       Based on the above components, the patient evaluation is determined to be of the following complexity level: LOW     Pain Rating:  Denied pain    Activity Tolerance:   Fair and SpO2 stable on RA    After treatment patient left in no apparent distress:   Sitting in chair, Call bell within reach, and Bed / chair alarm activated    COMMUNICATION/EDUCATION:   The patients plan of care was discussed with: Occupational therapist and Registered nurse. Fall prevention education was provided and the patient/caregiver indicated understanding., Patient/family have participated as able in goal setting and plan of care. , and Patient/family agree to work toward stated goals and plan of care.     Thank you for this referral.   Christi Baeza PT, DPT   Time Calculation: 34 mins

## 2021-04-19 NOTE — PROGRESS NOTES
Blood transfusion completed no adverse reaction noted. Cannot draw am labs at this time will notify incoming Rn to draw.

## 2021-04-19 NOTE — PROGRESS NOTES
Problem: Pressure Injury - Risk of  Goal: *Prevention of pressure injury  Description: Document South Scale and appropriate interventions in the flowsheet.   Outcome: Progressing Towards Goal  Note: Pressure Injury Interventions:  Sensory Interventions: Assess changes in LOC, Maintain/enhance activity level, Minimize linen layers    Moisture Interventions: Absorbent underpads    Activity Interventions: Increase time out of bed, Pressure redistribution bed/mattress(bed type), PT/OT evaluation    Mobility Interventions: HOB 30 degrees or less, Pressure redistribution bed/mattress (bed type)    Nutrition Interventions: (NPO)    Friction and Shear Interventions: Apply protective barrier, creams and emollients, Lift team/patient mobility team    Problem: Patient Education: Go to Patient Education Activity  Goal: Patient/Family Education  Outcome: Progressing Towards Goal

## 2021-04-19 NOTE — PROGRESS NOTES
[address] Adult  Hospitalist Group                                                                                          Hospitalist Progress Note  Fritz Alvarez MD        Date of Service:  2021  NAME:  Paula Crews  :  1930  MRN:  [id number]      Admission Summary:   79 yo female presented to the ED with persistent episodes of coffee ground emesis. Interval history / Subjective:    states she is hungry and mouth is dry     Assessment & Plan:     Acute GI bleeding (2021) / Coffee ground emesis (2021) POA:   -likely due to NSAIDs induced PUD. -h/h stable after receiving transfusion   -GI evaluated, holding off of EGD until goals of care clarified. Spoke with patient's son, he would like to take a conservative approach and monitor hgb for another 24-48 hrs and consider EGD if it doesn't remain stable, otherwise okay with DC tues or Wednesday.      HTN (hypertension), benign (2021) POA:   -Hold BP medications for now given likely drop if she continued to bleed. -IV Hydralazine PRN     Hypothyroidism (2021) POA:   -TSH normal.   -cont LT4     Anemia POA: unclear baseline.   -Likely due to blood loss. -monitor and transfuse if h/h drop <7     Arthritis (2021) POA: Tylenol PRN     Underweight (2021) POA: likely from advanced age, poor intake vs other.  Diet as tolerated    Code status: full  DVT prophylaxis: none       Hospital Problems  Date Reviewed: 2021          Codes Class Noted POA    Coffee ground emesis ICD-10-CM: K92.0  ICD-9-CM: 578.0  2021 Yes        * (Principal) Acute GI bleeding ICD-10-CM: K92.2  ICD-9-CM: 578.9  2021 Yes        HTN (hypertension), benign ICD-10-CM: I10  ICD-9-CM: 401.1  2021 Yes        Hypothyroidism ICD-10-CM: E03.9  ICD-9-CM: 244.9  2021 Yes        Arthritis ICD-10-CM: M19.90  ICD-9-CM: 716.90  2021 Yes        Underweight ICD-10-CM: R63.6  ICD-9-CM: 783.22  2021 Yes Review of Systems:   A comprehensive review of systems was negative except for that written in the HPI. Vital Signs:    Last 24hrs VS reviewed since prior progress note. Most recent are:  Visit Vitals  /60 (BP 1 Location: Left upper arm, BP Patient Position: At rest)   Pulse 67   Temp 98 °F (36.7 °C)   Resp 16   Ht 4' 7.98\" (1.422 m)   Wt 36.3 kg (80 lb)   SpO2 97%   BMI 17.95 kg/m²         Intake/Output Summary (Last 24 hours) at 4/19/2021 1425  Last data filed at 4/19/2021 0919  Gross per 24 hour   Intake 430 ml   Output    Net 430 ml        Physical Examination:     General:  Weak, cachectic looking elderly patient, in no acute distress  Eyes: Pale conjunctivae, PERRLA with no discharge. Normal eye movements  Ear, Nose, Mouth & Throat: No ottorrhea, rhinorrhea, non tender sinuses, dry mucous membranes  Respiratory:  No accessory muscle use, clear breath sounds without crackles or wheezes  Cardiovascular:  No JVD or murmurs, regular and normal S1, S2 without thrills, bruits. + peripheral edema. GI & :  Soft abdomen, non-tender, non-distended, normoactive bowel sounds with no palpable organomegaly  Heme:  No cervical or axillary adenopathy. Musculoskeletal:  No cyanosis, clubbing but generalized muscular atrophy   Skin:  No rashes, bruising or ulcers   Neurological: Awake and alert, speech is clear but slow.  Generally frail but no focal deficits   Psychiatric:  Has little insight to her illness     Data Review:    Review and/or order of clinical lab test      Labs:     Recent Labs     04/19/21  1002 04/18/21 2008 04/18/21  1126   WBC  --   --  12.4*   HGB 8.6* 6.1* 8.0*   HCT 28.0* 19.8* 26.3*   PLT  --   --  371     Recent Labs     04/19/21 1000 04/18/21  1126    139   K 4.7 4.6   * 107   CO2 28 29   BUN 30* 37*   CREA 0.62 0.91   GLU 76 134*   CA 7.9* 7.6*     Recent Labs     04/19/21 1000 04/18/21  1126   ALT 12 15   AP 68 87   TBILI 0.4 0.2   TP 4.2* 4.9*   ALB 2.0* 2.1* GLOB 2.2 2.8     Recent Labs     04/18/21  1126   INR 1.1   PTP 11.8*   APTT 23.4      Recent Labs     04/18/21  1126   TIBC 226*   PSAT 11*      Lab Results   Component Value Date/Time    Folate 6.8 04/18/2021 11:26 AM      No results for input(s): PH, PCO2, PO2 in the last 72 hours.   Recent Labs     04/18/21  1126   TROIQ <0.05     No results found for: CHOL, CHOLX, CHLST, CHOLV, HDL, HDLP, LDL, LDLC, DLDLP, TGLX, TRIGL, TRIGP, CHHD, CHHDX  No results found for: GLUCPOC  Lab Results   Component Value Date/Time    Color YELLOW/STRAW 04/18/2021 10:00 AM    Appearance CLOUDY (A) 04/18/2021 10:00 AM    Specific gravity 1.015 04/18/2021 10:00 AM    pH (UA) 5.0 04/18/2021 10:00 AM    Protein Negative 04/18/2021 10:00 AM    Glucose Negative 04/18/2021 10:00 AM    Ketone Negative 04/18/2021 10:00 AM    Bilirubin Negative 04/18/2021 10:00 AM    Urobilinogen 0.2 04/18/2021 10:00 AM    Nitrites Positive (A) 04/18/2021 10:00 AM    Leukocyte Esterase MODERATE (A) 04/18/2021 10:00 AM    Epithelial cells FEW 04/18/2021 10:00 AM    Bacteria 4+ (A) 04/18/2021 10:00 AM    WBC 10-20 04/18/2021 10:00 AM    RBC 0-5 04/18/2021 10:00 AM         Medications Reviewed:     Current Facility-Administered Medications   Medication Dose Route Frequency    sodium chloride (NS) flush 5-40 mL  5-40 mL IntraVENous Q8H    sodium chloride (NS) flush 5-40 mL  5-40 mL IntraVENous PRN    acetaminophen (TYLENOL) tablet 650 mg  650 mg Oral Q6H PRN    Or    acetaminophen (TYLENOL) suppository 650 mg  650 mg Rectal Q6H PRN    polyethylene glycol (MIRALAX) packet 17 g  17 g Oral DAILY PRN    senna (SENOKOT) tablet 8.6 mg  1 Tab Oral DAILY PRN    promethazine (PHENERGAN) tablet 12.5 mg  12.5 mg Oral Q6H PRN    Or    ondansetron (ZOFRAN) injection 4 mg  4 mg IntraVENous Q6H PRN    pantoprazole (PROTONIX) 40 mg in 0.9% sodium chloride 10 mL injection  40 mg IntraVENous Q12H    0.9% sodium chloride infusion 250 mL  250 mL IntraVENous PRN ______________________________________________________________________  EXPECTED LENGTH OF STAY: 3d 0h  ACTUAL LENGTH OF STAY:          Jake Shaikh MD

## 2021-04-19 NOTE — PROGRESS NOTES
20:45 Call placed to MD Guallpa about pt's hemoglobin of 6.1. No active bleeding noted. Order placed for type and screen and 1 unit of PRBC's.     12 ;00 Call placed to blood bank to verify if blood if ready. OK to  blood. 12:45 blood transfusion started per protocol verified b;blood with second PATRICIA Guerin  pt tolerating well no distress noted. Pt educated accordingly. Will continue to monitor pt closely.

## 2021-04-19 NOTE — PROGRESS NOTES
Problem: Self Care Deficits Care Plan (Adult)  Goal: *Acute Goals and Plan of Care (Insert Text)  Description: FUNCTIONAL STATUS PRIOR TO ADMISSION: Pt is a questionable historian. Confused during questioning but reports today that prior to admission she was living with her son and daughter in law and used a rollator for mobility. Denies recent falls and states that she has not left the house and requires assistance for ADLs. HOME SUPPORT PRIOR TO ADMISSION: The patient lived with son and daughter in law and required assistance for ADLs. Unclear assistance level needed for transfers and gait. Occupational Therapy Goals  Initiated 4/19/2021  1. Patient will perform grooming with supervision/set-up with minimal verbal cues within 7 day(s). 2.  Patient will perform upper body dressing with minimal assistance/contact guard assist within 7 day(s). 3.  Patient will perform lower body dressing with moderate assistance within 7 day(s). 4.  Patient will perform all aspects of toileting with minimal assistance/contact guard assist within 7 day(s). 5.  Patient will participate in upper extremity therapeutic exercise/activities with minimal assistance/contact guard assist for 5 minutes within 7 day(s). Outcome: Progressing Towards Goal     OCCUPATIONAL THERAPY EVALUATION  Patient: Usha Hull (15 y.o. female)  Date: 4/19/2021  Primary Diagnosis: Coffee ground emesis [K92.0]        Precautions: Fall       ASSESSMENT  Based on the objective data described below, the patient presents with confusion, decreased activity tolerance, impaired balance, impaired ROM, decreased strength, and decreased independence with ADLs and mobility following admission for coffee ground emesis with concern for upper GIB. Pt is limited most by confusion this session, oriented to self and partially to place only. She is able to mobilize to bathroom with overall CGA using rolling walker and requires min A for toilet transfer.  She benefits from multi-modal cues for RW mgmt and min A for standing grooming tasks at sink, with one large posterior LOB requiring physical assist for correction/fall prevention. Pt with decreased functional reach to LEs, requiring max A for distal mgmt of LB dressing tasks. At this time, anticipate pt is functioning below her baseline and will benefit from continued skilled OT services. Pending clarification of PLOF and family ability to assist, pt may benefit from SNF-level rehab vs. Home with [facility] and 24 hour supervision/assistance. Current Level of Function Impacting Discharge (ADLs/self-care): up to min A for ADL transfers; up to max A for LB ADLs; set up to mod A for UB ADLs    Functional Outcome Measure: The patient scored Total: 35/100 on the Barthel Index outcome measure which is indicative of 65% impaired ability to care for basic self needs/dependency on others; inferred 100% dependency on others for instrumental ADLs. Other factors to consider for discharge: high fall risk; unclear PLOF; confusion     Patient will benefit from skilled therapy intervention to address the above noted impairments. PLAN :  Recommendations and Planned Interventions: self care training, functional mobility training, therapeutic exercise, balance training, therapeutic activities, endurance activities, patient education, home safety training and family training/education    Frequency/Duration: Patient will be followed by occupational therapy 3 times a week to address goals. Recommendation for discharge: (in order for the patient to meet his/her long term goals)  Occupational therapy at least 2 days/week in the home AND ensure assist and/or supervision for safety with all mobility, transfers, and ADLs. If family is unable to provide this level of assistance, recommend SNF placement.      This discharge recommendation:  Has been made in collaboration with the attending provider and/or case management    IF patient discharges home will need the following DME: TBD - rolling walker, shower chair       SUBJECTIVE:   Patient stated What do I need to do now?     OBJECTIVE DATA SUMMARY:   HISTORY:   Past Medical History:   Diagnosis Date    Arthritis     Hypertension     Hypothyroidism      Past Surgical History:   Procedure Laterality Date    HX CHOLECYSTECTOMY      HX GYN      partial hysterectomy, uterine prolapse    HX HEENT      cataract    HX ORTHOPAEDIC Right     knee replacement       Expanded or extensive additional review of patient history:     Home Situation  Home Environment: Private residence  # Steps to Enter: 4  Rails to Enter: Yes  Support Systems: Family member(s)  Current DME Used/Available at Home: [employer], straight, Walker, rollator, Walker, rolling    Hand dominance: Right    EXAMINATION OF PERFORMANCE DEFICITS:  Cognitive/Behavioral Status:  Neurologic State: Alert  Orientation Level: Oriented to person;Oriented to place; Disoriented to situation;Disoriented to time  Cognition: Follows commands;Decreased attention/concentration  Perception: Appears intact  Perseveration: No perseveration noted  Safety/Judgement: Decreased awareness of environment;Decreased insight into deficits    Range of Motion:  AROM: Generally decreased, functional  PROM: Generally decreased, functional    Strength:  Strength: Generally decreased, functional    Coordination:  Coordination: Generally decreased, functional  Fine Motor Skills-Upper: Left Intact; Right Intact    Gross Motor Skills-Upper: Left Intact; Right Intact    Tone & Sensation:  Tone: Normal  Sensation: Intact    Balance:  Sitting: Intact  Standing: Impaired; With support  Standing - Static: Fair;Constant support  Standing - Dynamic : Fair;Occasional;Constant support    Functional Mobility and Transfers for ADLs:  Bed Mobility:  Supine to Sit: Stand-by assistance  Scooting: Stand-by assistance    Transfers:  Sit to Stand: Minimum assistance  Stand to Sit: Minimum assistance  Toilet Transfer : Minimum assistance    ADL Assessment:  Feeding: Setup;Minimum assistance    Oral Facial Hygiene/Grooming: Minimum assistance    Bathing: Moderate assistance    Upper Body Dressing: Minimum assistance    Lower Body Dressing: Minimum assistance; Moderate assistance    Toileting: Minimum assistance; Moderate assistance    ADL Intervention and task modifications:  Grooming  Grooming Assistance: Minimum assistance  Position Performed: Standing(at sink with rolling walker)  Washing Hands: Contact guard assistance;Minimum assistance  Cues: Physical assistance; Tactile cues provided;Verbal cues provided;Visual cues provided    Lower Body Dressing Assistance  Socks: Maximum assistance  Leg Crossed Method Used: No  Position Performed: Bending forward method;Seated edge of bed  Cues: Don;Physical assistance; Tactile cues provided;Verbal cues provided;Visual cues provided    Toileting  Clothing Management: Moderate assistance  Adaptive Equipment: Grab bars; Walker    Cognitive Retraining  Safety/Judgement: Decreased awareness of environment;Decreased insight into deficits    Functional Measure:  Barthel Index:    Bathin  Bladder: 0  Bowels: 5  Groomin  Dressin  Feedin  Mobility: 5  Stairs: 0  Toilet Use: 5  Transfer (Bed to Chair and Back): 10  Total: 35/100        The Barthel ADL Index: Guidelines  1. The index should be used as a record of what a patient does, not as a record of what a patient could do. 2. The main aim is to establish degree of independence from any help, physical or verbal, however minor and for whatever reason. 3. The need for supervision renders the patient not independent. 4. A patient's performance should be established using the best available evidence. Asking the patient, friends/relatives and nurses are the usual sources, but direct observation and common sense are also important. However direct testing is not needed.   5. Usually the patient's performance over the preceding 24-48 hours is important, but occasionally longer periods will be relevant. 6. Middle categories imply that the patient supplies over 50 per cent of the effort. 7. Use of aids to be independent is allowed. Jarrett Carey, Barthel, D.W. (5619). Functional evaluation: the Barthel Index. [facility] (14)2. RUDI Lopez, Jose Peoples., Denise Stern., Tin, [address] [place] [address] (1999). Measuring the change indisability after inpatient rehabilitation; comparison of the responsiveness of the Barthel Index and Functional [place] Measure. Journal of Neurology, Neurosurgery, and Psychiatry, 66(4), 719-759. Blair Ferrari, N.J.A, PHUC Jesus, & Aleksandar Montalvo MSITA. (2004.) Assessment of post-stroke quality of life in cost-effectiveness studies: The usefulness of the Barthel Index and the EuroQoL-5D. Quality of Life Research, 15, 541-02     Occupational Therapy Evaluation Charge Determination   History Examination Decision-Making   LOW Complexity : Brief history review  MEDIUM Complexity : 3-5 performance deficits relating to physical, cognitive , or psychosocial skils that result in activity limitations and / or participation restrictions MEDIUM Complexity : Patient may present with comorbidities that affect occupational performnce. Miniml to moderate modification of tasks or assistance (eg, physical or verbal ) with assesment(s) is necessary to enable patient to complete evaluation       Based on the above components, the patient evaluation is determined to be of the following complexity level: LOW   Pain Rating:  Pt reporting minimal pain    Activity Tolerance:   Fair    After treatment patient left in no apparent distress:    Sitting in chair, Call bell within reach and Bed / chair alarm activated    COMMUNICATION/EDUCATION:   The patients plan of care was discussed with: Physical therapist, Registered nurse and Case management.      Patient/family have participated as able in goal setting and plan of care. This patients plan of care is appropriate for delegation to NATALIE.     Thank you for this referral.  Collins Pugh OT  Time Calculation: 35 mins

## 2021-04-19 NOTE — PROGRESS NOTES
4/19/2021  1:20 PM  Reason for Admission: Emergency - Ground coffee emesis due to GI bleed. GI following. EGD if bleeding continues. ICD-10-CM ICD-9-CM    1. Upper GI bleed  K92.2 578.9    2. Hematemesis, presence of nausea not specified  K92.0 578.0    3. Anemia, unspecified type  D64.9 285.9            Assessment:   []In person with pt   []Via p/c with pt   []With family member in person. Who/Relation:     [x]With family member via p/c. Who/Relation: Antoinette Hoit / son - [phone]  []Chart Review    RUR: 9%  Risk Level: [x]Low []Moderate []High  Value-based purchasing: [] Yes [x] No  Bundle patient: [] Yes [x] No   Specify:     Advance Directive: Full Code. [x] No AD on file. [] AD on file. [] Current AD not on file. Copy requested. [] Requests AD, and referral submitted to [facility]. Healthcare Decision Maker:   Primary Decision Maker: Alexandra Chua Daughter-in-Law - [phone]. CM confirmed this with pt's son. Pt's DIL is easier to contact while pt's son is at work. Assessment:    Age: 80    Sex: [] Male [x]Female     Residency: [x]Private residence []Apartment []Assisted Living []LTC []Other:   Exterior Steps: 4   Interior Steps: 0    Lives With: []With spouse [x]Other family members (son, DIL) []Underage children []Alone []Care provider []Other:    Prior functioning:  []Independent with ADLs and iADLS []Dependent with ADLs and iADLs [x]Partial dependence, Specify: Pt has personal care aides through [employer] that come into the home M-F from 9:15 AM to 12:15 PM, then is alone until 5 pm when DIL or son returns home.      Prior DME required:  []None [x]RW []Cane []Crutches []Bedside commode []CPAP []Home O2 (Liter/Provider: ) []Nebulizer   []Shower Chair []Wheelchair []Hospital Bed []Mode []Stair lift []Rollator []Other:    DME available: []None [x]RW []Cane []Crutches []Bedside commode []CPAP []Home O2 (Liter/Provider: ) []Nebulizer   []Shower Chair []Wheelchair []Hospital Bed []Mode []Stair lift []Rollator []Other:    Rehab history: []None []Outpatient PT [x]Home Health (Provider/Date: unknown / 2021) [x]SNF (Provider/Date: SNF in [place] / 01/2021) []IPR (Provider/Date: ) []LTC (Provider/Date: ) []Hospice (Provider/Date: )  []Other:     Discharge Concerns: [x]Yes []No []Unknown   Describe: Pt's son reports he does not feel he and his wife can meet pt needs if 2 hour supervision is required. They are looking into having pt live in an [facility], and have tours scheduled today and tomorrow. Pt's son reported he felt pt may need SNF in the interim if appropriate. Comments: Insurer:   Insurance [employer] [address] MEDICARE PART A & B Phone: [phone]    Subscriber: Ronda Guerra Subscriber#: [id number]    Group#:  Precert#:       *On May 1, 2021, pt will have a [employer] Medicare Gold Plus PPO plan. PCP: No current PCP. Pt's son was planning to schedule an appt for pt with James Backer. Pharmacy:  [address] Transport: TBD       Transition of care plan:    [x]Unable to determine at this time. Awaiting clinical progress, and disposition recommendations. SNF vs [facility] vs HH likely    [] Home with outpatient follow-up    [] Home with Outpatient PT and outpatient follow-up   Pt aware of OP appt? []Yes, Provider:   []Not scheduled   Transport provider:     [] Home with family assistance as needed and outpatient follow-up   Family able to assist:    Schedule:  Transport provider:      [] Home with Home Health   - Provider:     []SNF/IPR   -[]Preferences given:   []Listing provided and preferences requested   -Status: []Pending []Accepted:    -Auth required: []Yes []No    -Auth initiated date:   -3 midnight stay required: []Yes []No  Date satisfied:     [] Home with Hospice   -Provider:     [] [facility] information provided.      [] Other:     Yvonne Collins MA    Care Management Interventions  PCP Verified by CM: Yes(No PCP. Pt's son prefers [address])  Mode of Transport at Discharge:  Other (see comment)(TBD)  MyChart Signup: No  Discharge Durable Medical Equipment: No  Physical Therapy Consult: Yes  Occupational Therapy Consult: Yes  Speech Therapy Consult: No  Current Support Network: Relative's Home, Has Personal Caregivers  Confirm Follow Up Transport: Family  Discharge Location  Discharge Placement: Unable to determine at this time

## 2021-04-19 NOTE — PROGRESS NOTES
Bedside shift change report given to ALEX RANDHAWA (oncoming nurse) by Romain Ortega (offgoing nurse). Report included the following information SBAR, Kardex, Intake/Output, MAR and Recent Results. Unable to complete admission documentation due to patient's confusion.

## 2021-04-19 NOTE — PROGRESS NOTES
Gastrointestinal Progress Note    4/19/2021    Admit Date: 4/18/2021    Subjective:     Appears comfortable; I do not get a coherent response to questions. RN staff do not indicate gi blood loss seen    Current Facility-Administered Medications   Medication Dose Route Frequency    sodium chloride (NS) flush 5-40 mL  5-40 mL IntraVENous Q8H    sodium chloride (NS) flush 5-40 mL  5-40 mL IntraVENous PRN    acetaminophen (TYLENOL) tablet 650 mg  650 mg Oral Q6H PRN    Or    acetaminophen (TYLENOL) suppository 650 mg  650 mg Rectal Q6H PRN    polyethylene glycol (MIRALAX) packet 17 g  17 g Oral DAILY PRN    senna (SENOKOT) tablet 8.6 mg  1 Tab Oral DAILY PRN    promethazine (PHENERGAN) tablet 12.5 mg  12.5 mg Oral Q6H PRN    Or    ondansetron (ZOFRAN) injection 4 mg  4 mg IntraVENous Q6H PRN    pantoprazole (PROTONIX) 40 mg in 0.9% sodium chloride 10 mL injection  40 mg IntraVENous Q12H    0.9% sodium chloride infusion 250 mL  250 mL IntraVENous PRN        Objective:     Blood pressure 107/60, pulse 67, temperature 98 °F (36.7 °C), resp. rate 16, height 4' 8\" (1.422 m), weight 36.3 kg (80 lb), SpO2 97 %. No intake/output data recorded. 04/17 1901 - 04/19 0700  In: 200 [P.O.:120]  Out: -     EXAM:  GENERAL: alert, no distress, HEART: regular rate and rhythm, LUNGS: chest clear, no wheezing, rales, normal symmetric air entry, Heart exam - S1, S2 normal, no murmur, no gallop, rate regular, ABDOMEN:  Bowel sounds are normal, liver is not enlarged, spleen is not enlarged and EXTREMITY: no edema  Mild upper abd tenderness  Data Review    Recent Results (from the past 24 hour(s))   HGB & HCT    Collection Time: 04/18/21  8:08 PM   Result Value Ref Range    HGB 6.1 (L) 11.5 - 16.0 g/dL    HCT 19.8 (L) 35.0 - 47.0 %   RBC, ALLOCATE    Collection Time: 04/18/21  9:15 PM   Result Value Ref Range    HISTORY CHECKED?  Historical check performed    METABOLIC PANEL, COMPREHENSIVE    Collection Time: 04/19/21 10:00 AM Result Value Ref Range    Sodium 143 136 - 145 mmol/L    Potassium 4.7 3.5 - 5.1 mmol/L    Chloride 111 (H) 97 - 108 mmol/L    CO2 28 21 - 32 mmol/L    Anion gap 4 (L) 5 - 15 mmol/L    Glucose 76 65 - 100 mg/dL    BUN 30 (H) 6 - 20 MG/DL    Creatinine 0.62 0.55 - 1.02 MG/DL    BUN/Creatinine ratio 48 (H) 12 - 20      GFR est AA >60 >60 ml/min/1.73m2    GFR est non-AA >60 >60 ml/min/1.73m2    Calcium 7.9 (L) 8.5 - 10.1 MG/DL    Bilirubin, total 0.4 0.2 - 1.0 MG/DL    ALT (SGPT) 12 12 - 78 U/L    AST (SGOT) 13 (L) 15 - 37 U/L    Alk. phosphatase 68 45 - 117 U/L    Protein, total 4.2 (L) 6.4 - 8.2 g/dL    Albumin 2.0 (L) 3.5 - 5.0 g/dL    Globulin 2.2 2.0 - 4.0 g/dL    A-G Ratio 0.9 (L) 1.1 - 2.2     HGB & HCT    Collection Time: 04/19/21 10:02 AM   Result Value Ref Range    HGB 8.6 (L) 11.5 - 16.0 g/dL    HCT 28.0 (L) 35.0 - 47.0 %       Assessment:     Principal Problem:    Acute GI bleeding (4/18/2021)    Active Problems:    Coffee ground emesis (4/18/2021)      HTN (hypertension), benign (4/18/2021)      Hypothyroidism (4/18/2021)      Arthritis (4/18/2021)      Underweight (4/18/2021)        Plan:     1. Parenteral iron, B12  2.   Holding endoscopic examination pending better definition goals of care

## 2021-04-20 ENCOUNTER — APPOINTMENT (OUTPATIENT)
Dept: NON INVASIVE DIAGNOSTICS | Age: 86
DRG: 377 | End: 2021-04-20
Attending: NURSE PRACTITIONER
Payer: MEDICARE

## 2021-04-20 ENCOUNTER — APPOINTMENT (OUTPATIENT)
Dept: GENERAL RADIOLOGY | Age: 86
DRG: 377 | End: 2021-04-20
Attending: FAMILY MEDICINE
Payer: MEDICARE

## 2021-04-20 LAB
ABO + RH BLD: NORMAL
ANION GAP SERPL CALC-SCNC: 10 MMOL/L (ref 5–15)
AV R PG: 43.76 MMHG
BASOPHILS # BLD: 0 K/UL (ref 0–0.1)
BASOPHILS NFR BLD: 0 % (ref 0–1)
BLD PROD TYP BPU: NORMAL
BLOOD GROUP ANTIBODIES SERPL: NORMAL
BPU ID: NORMAL
BUN SERPL-MCNC: 28 MG/DL (ref 6–20)
BUN/CREAT SERPL: 47 (ref 12–20)
CALCIUM SERPL-MCNC: 8.4 MG/DL (ref 8.5–10.1)
CHLORIDE SERPL-SCNC: 111 MMOL/L (ref 97–108)
CO2 SERPL-SCNC: 23 MMOL/L (ref 21–32)
CREAT SERPL-MCNC: 0.59 MG/DL (ref 0.55–1.02)
CROSSMATCH RESULT,%XM: NORMAL
DIFFERENTIAL METHOD BLD: ABNORMAL
ECHO AO ASC DIAM: 3.34 CM
ECHO AO ROOT DIAM: 3.55 CM
ECHO AR MAX VEL PISA: 330.75 CM/S
ECHO AV AREA PEAK VELOCITY: 1.72 CM2
ECHO AV AREA PEAK VELOCITY: 1.8 CM2
ECHO AV AREA PEAK VELOCITY: 1.88 CM2
ECHO AV AREA PEAK VELOCITY: 1.98 CM2
ECHO AV AREA VTI: 2.12 CM2
ECHO AV AREA/BSA VTI: 1.8 CM2/M2
ECHO AV MEAN GRADIENT: 5.93 MMHG
ECHO AV PEAK GRADIENT: 10.99 MMHG
ECHO AV PEAK GRADIENT: 12.15 MMHG
ECHO AV PEAK VELOCITY: 165.75 CM/S
ECHO AV PEAK VELOCITY: 174.31 CM/S
ECHO AV REGURGITANT PHT: 519.26 MS
ECHO AV VTI: 33.43 CM
ECHO IVC PROX: 1.45 CM
ECHO LA MAJOR AXIS: 3.7 CM
ECHO LA MINOR AXIS: 3.08 CM
ECHO LA VOL 2C: 45.38 ML (ref 22–52)
ECHO LA VOL 4C: 41.26 ML (ref 22–52)
ECHO LA VOL BP: 49.1 ML (ref 22–52)
ECHO LA VOL/BSA BIPLANE: 40.82 ML/M2 (ref 16–28)
ECHO LA VOLUME INDEX A2C: 37.73 ML/M2 (ref 16–28)
ECHO LA VOLUME INDEX A4C: 34.3 ML/M2 (ref 16–28)
ECHO LV INTERNAL DIMENSION DIASTOLIC: 3.58 CM (ref 3.9–5.3)
ECHO LV INTERNAL DIMENSION SYSTOLIC: 2.2 CM
ECHO LV IVSD: 1.18 CM (ref 0.6–0.9)
ECHO LV MASS 2D: 140.3 G (ref 67–162)
ECHO LV MASS INDEX 2D: 116.7 G/M2 (ref 43–95)
ECHO LV POSTERIOR WALL DIASTOLIC: 1.22 CM (ref 0.6–0.9)
ECHO LVOT DIAM: 1.99 CM
ECHO LVOT PEAK GRADIENT: 3.69 MMHG
ECHO LVOT PEAK GRADIENT: 4.46 MMHG
ECHO LVOT PEAK VELOCITY: 105.54 CM/S
ECHO LVOT PEAK VELOCITY: 96.09 CM/S
ECHO LVOT SV: 70.7 ML
ECHO LVOT VTI: 22.73 CM
ECHO MV A VELOCITY: 106.06 CM/S
ECHO MV E DECELERATION TIME (DT): 304.15 MS
ECHO MV E VELOCITY: 69.48 CM/S
ECHO MV E/A RATIO: 0.66
ECHO PV PEAK INSTANTANEOUS GRADIENT SYSTOLIC: 5.3 MMHG
ECHO PV REGURGITANT MAX VELOCITY: 100.65 CM/S
ECHO RV INTERNAL DIMENSION: 4.34 CM
ECHO TV REGURGITANT MAX VELOCITY: 218.82 CM/S
ECHO TV REGURGITANT PEAK GRADIENT: 19.15 MMHG
EOSINOPHIL # BLD: 0.1 K/UL (ref 0–0.4)
EOSINOPHIL NFR BLD: 1 % (ref 0–7)
ERYTHROCYTE [DISTWIDTH] IN BLOOD BY AUTOMATED COUNT: 15 % (ref 11.5–14.5)
GLUCOSE BLD STRIP.AUTO-MCNC: 104 MG/DL (ref 65–100)
GLUCOSE BLD STRIP.AUTO-MCNC: 107 MG/DL (ref 65–100)
GLUCOSE BLD STRIP.AUTO-MCNC: 186 MG/DL (ref 65–100)
GLUCOSE BLD STRIP.AUTO-MCNC: 43 MG/DL (ref 65–100)
GLUCOSE BLD STRIP.AUTO-MCNC: 51 MG/DL (ref 65–100)
GLUCOSE SERPL-MCNC: 44 MG/DL (ref 65–100)
HCT VFR BLD AUTO: 30.7 % (ref 35–47)
HGB BLD-MCNC: 9.3 G/DL (ref 11.5–16)
IMM GRANULOCYTES # BLD AUTO: 0 K/UL (ref 0–0.04)
IMM GRANULOCYTES NFR BLD AUTO: 0 % (ref 0–0.5)
LVOT MG: 1.95 MMHG
LYMPHOCYTES # BLD: 1.4 K/UL (ref 0.8–3.5)
LYMPHOCYTES NFR BLD: 20 % (ref 12–49)
MCH RBC QN AUTO: 28.1 PG (ref 26–34)
MCHC RBC AUTO-ENTMCNC: 30.3 G/DL (ref 30–36.5)
MCV RBC AUTO: 92.7 FL (ref 80–99)
MONOCYTES # BLD: 0.4 K/UL (ref 0–1)
MONOCYTES NFR BLD: 6 % (ref 5–13)
NEUTS SEG # BLD: 5 K/UL (ref 1.8–8)
NEUTS SEG NFR BLD: 73 % (ref 32–75)
NRBC # BLD: 0 K/UL (ref 0–0.01)
NRBC BLD-RTO: 0 PER 100 WBC
PLATELET # BLD AUTO: 291 K/UL (ref 150–400)
PMV BLD AUTO: 8.5 FL (ref 8.9–12.9)
POTASSIUM SERPL-SCNC: 3.9 MMOL/L (ref 3.5–5.1)
RBC # BLD AUTO: 3.31 M/UL (ref 3.8–5.2)
SARS-COV-2, COV2: NORMAL
SERVICE CMNT-IMP: ABNORMAL
SODIUM SERPL-SCNC: 144 MMOL/L (ref 136–145)
SPECIMEN EXP DATE BLD: NORMAL
STATUS OF UNIT,%ST: NORMAL
UNIT DIVISION, %UDIV: 0
WBC # BLD AUTO: 7 K/UL (ref 3.6–11)

## 2021-04-20 PROCEDURE — 80048 BASIC METABOLIC PNL TOTAL CA: CPT

## 2021-04-20 PROCEDURE — 74011250636 HC RX REV CODE- 250/636: Performed by: FAMILY MEDICINE

## 2021-04-20 PROCEDURE — 65660000000 HC RM CCU STEPDOWN

## 2021-04-20 PROCEDURE — 74011000250 HC RX REV CODE- 250: Performed by: INTERNAL MEDICINE

## 2021-04-20 PROCEDURE — 77030038269 HC DRN EXT URIN PURWCK BARD -A

## 2021-04-20 PROCEDURE — 85025 COMPLETE CBC W/AUTO DIFF WBC: CPT

## 2021-04-20 PROCEDURE — 71045 X-RAY EXAM CHEST 1 VIEW: CPT

## 2021-04-20 PROCEDURE — U0005 INFEC AGEN DETEC AMPLI PROBE: HCPCS

## 2021-04-20 PROCEDURE — 82962 GLUCOSE BLOOD TEST: CPT

## 2021-04-20 PROCEDURE — 74011250637 HC RX REV CODE- 250/637: Performed by: INTERNAL MEDICINE

## 2021-04-20 PROCEDURE — 74011000250 HC RX REV CODE- 250: Performed by: FAMILY MEDICINE

## 2021-04-20 PROCEDURE — 93306 TTE W/DOPPLER COMPLETE: CPT

## 2021-04-20 PROCEDURE — 93271 ECG/MONITORING AND ANALYSIS: CPT

## 2021-04-20 PROCEDURE — 36415 COLL VENOUS BLD VENIPUNCTURE: CPT

## 2021-04-20 RX ORDER — DEXTROSE 50 % IN WATER (D50W) INTRAVENOUS SYRINGE
12.5-25 AS NEEDED
Status: DISCONTINUED | OUTPATIENT
Start: 2021-04-20 | End: 2021-04-25 | Stop reason: HOSPADM

## 2021-04-20 RX ORDER — CIPROFLOXACIN 500 MG/1
500 TABLET ORAL 2 TIMES DAILY
Qty: 6 TAB | Refills: 0 | Status: SHIPPED | OUTPATIENT
Start: 2021-04-20 | End: 2021-04-23

## 2021-04-20 RX ORDER — PANTOPRAZOLE SODIUM 40 MG/1
40 TABLET, DELAYED RELEASE ORAL
Status: DISCONTINUED | OUTPATIENT
Start: 2021-04-20 | End: 2021-04-25 | Stop reason: HOSPADM

## 2021-04-20 RX ORDER — DEXTROSE MONOHYDRATE AND SODIUM CHLORIDE 5; .45 G/100ML; G/100ML
100 INJECTION, SOLUTION INTRAVENOUS CONTINUOUS
Status: DISCONTINUED | OUTPATIENT
Start: 2021-04-20 | End: 2021-04-21

## 2021-04-20 RX ORDER — MAGNESIUM SULFATE 100 %
4 CRYSTALS MISCELLANEOUS AS NEEDED
Status: DISCONTINUED | OUTPATIENT
Start: 2021-04-20 | End: 2021-04-25 | Stop reason: HOSPADM

## 2021-04-20 RX ORDER — PANTOPRAZOLE SODIUM 40 MG/1
40 TABLET, DELAYED RELEASE ORAL
Qty: 60 TAB | Refills: 0 | Status: SHIPPED | OUTPATIENT
Start: 2021-04-20 | End: 2021-04-23

## 2021-04-20 RX ADMIN — DEXTROSE MONOHYDRATE 25 G: 25 INJECTION, SOLUTION INTRAVENOUS at 05:34

## 2021-04-20 RX ADMIN — CEFTRIAXONE 1 G: 1 INJECTION, POWDER, FOR SOLUTION INTRAMUSCULAR; INTRAVENOUS at 10:24

## 2021-04-20 RX ADMIN — Medication 10 ML: at 16:00

## 2021-04-20 RX ADMIN — Medication 10 ML: at 20:25

## 2021-04-20 RX ADMIN — DEXTROSE AND SODIUM CHLORIDE 100 ML/HR: 5; 450 INJECTION, SOLUTION INTRAVENOUS at 05:35

## 2021-04-20 RX ADMIN — PANTOPRAZOLE SODIUM 40 MG: 40 TABLET, DELAYED RELEASE ORAL at 10:25

## 2021-04-20 RX ADMIN — DEXTROSE AND SODIUM CHLORIDE 100 ML/HR: 5; 450 INJECTION, SOLUTION INTRAVENOUS at 16:00

## 2021-04-20 RX ADMIN — Medication 10 ML: at 05:40

## 2021-04-20 RX ADMIN — PANTOPRAZOLE SODIUM 40 MG: 40 TABLET, DELAYED RELEASE ORAL at 16:00

## 2021-04-20 NOTE — PROGRESS NOTES
Occupational Therapy:  04/20/21    Chart reviewed in prep for OT tx. Pt currently receiving ECHO at bedside and unavailable to participate. Will defer and continue to follow.      Thank you,  Edwin Arias, OTR/L

## 2021-04-20 NOTE — PROGRESS NOTES
[facility] visit. [facility]. She was sound asleep with no family visitor at the time of this visit. Prayer for spiritual communion offered at bedside for Mrs. Chantal Bueno.     SULEMA Carias, RN, ACSW, LCSW   Page:  [phone]

## 2021-04-20 NOTE — PROGRESS NOTES
Cardiac Monitor ordered. Spoke with Lalita GOMEZ. PT is medically read for DC but Family actively looking to secure dc/ transfer directly to AL. PT is not scheduled for DC at this time and Jey Sheriff does not believe she will dc this evening. I will check back first thing in the AM.    When I went to the pat room she was alone and is not able to take instruction.

## 2021-04-20 NOTE — PROGRESS NOTES
4:30 PM  CM called pt's son to follow up on progress of AL search. He was in the middle of a meeting with an assisted living facility and will call back CM. Annie Linda    11:36 AM  CM spoke to patients son, Rose Marie Sheehan regarding [employer]. He spoke to MD (Dr. Dipak Rangel) this am and is aware pt is medically stable for dc. He and his wife have looked at several Assisted living facilities yesterday and are looking at two more today. They would prefer if pt went straight from the hospital to AL, CM asked if the family could set up more care until they could finalize AL decisions since pt is medically stable for dc. Pt's son will call the private duty provider and see if that is a possibility but reiterated he would prefer if his mother went straight to AL. CM advised to ask the facilities how quickly they could accept his mother for admission. He will call the ones he saw yesterday and ask the ones he is seeing today. A chest xray and COVID test have been ordered in anticipation of pt needing it for the facilities. CM will continue to follow.  Annie Mckeon

## 2021-04-20 NOTE — PROGRESS NOTES
Gastrointestinal Progress Note    4/20/2021    Admit Date: 4/18/2021    Subjective:     Alert, comfortable, much more responsive today. Denies pain, nausea. Nursing staff indicate absence of any vomiting, visible bleeding. Just advance to a full liquid diet. Current Facility-Administered Medications   Medication Dose Route Frequency    glucose chewable tablet 16 g  4 Tab Oral PRN    dextrose (D50W) injection syrg 12.5-25 g  12.5-25 g IntraVENous PRN    glucagon (GLUCAGEN) injection 1 mg  1 mg IntraMUSCular PRN    dextrose 5 % - 0.45% NaCl infusion  100 mL/hr IntraVENous CONTINUOUS    cefTRIAXone (ROCEPHIN) 1 g in sterile water (preservative free) 10 mL IV syringe  1 g IntraVENous Q24H    sodium chloride (NS) flush 5-40 mL  5-40 mL IntraVENous Q8H    sodium chloride (NS) flush 5-40 mL  5-40 mL IntraVENous PRN    acetaminophen (TYLENOL) tablet 650 mg  650 mg Oral Q6H PRN    Or    acetaminophen (TYLENOL) suppository 650 mg  650 mg Rectal Q6H PRN    polyethylene glycol (MIRALAX) packet 17 g  17 g Oral DAILY PRN    senna (SENOKOT) tablet 8.6 mg  1 Tab Oral DAILY PRN    promethazine (PHENERGAN) tablet 12.5 mg  12.5 mg Oral Q6H PRN    Or    ondansetron (ZOFRAN) injection 4 mg  4 mg IntraVENous Q6H PRN    pantoprazole (PROTONIX) 40 mg in 0.9% sodium chloride 10 mL injection  40 mg IntraVENous Q12H    0.9% sodium chloride infusion 250 mL  250 mL IntraVENous PRN        Objective:     Blood pressure 134/67, pulse 84, temperature 97.9 °F (36.6 °C), resp. rate 15, height 4' 7.98\" (1.422 m), weight 36.3 kg (80 lb), SpO2 98 %. No intake/output data recorded.     04/18 1901 - 04/20 0700  In: 310   Out: -     EXAM:  GENERAL: alert, no distress, HEART: regular rate and rhythm, LUNGS: chest clear, no wheezing, rales, normal symmetric air entry, ABDOMEN:  Bowel sounds are normal, liver is not enlarged, spleen is not enlarged and EXTREMITY: no edema      Data Review    Recent Results (from the past 24 hour(s)) METABOLIC PANEL, COMPREHENSIVE    Collection Time: 04/19/21 10:00 AM   Result Value Ref Range    Sodium 143 136 - 145 mmol/L    Potassium 4.7 3.5 - 5.1 mmol/L    Chloride 111 (H) 97 - 108 mmol/L    CO2 28 21 - 32 mmol/L    Anion gap 4 (L) 5 - 15 mmol/L    Glucose 76 65 - 100 mg/dL    BUN 30 (H) 6 - 20 MG/DL    Creatinine 0.62 0.55 - 1.02 MG/DL    BUN/Creatinine ratio 48 (H) 12 - 20      GFR est AA >60 >60 ml/min/1.73m2    GFR est non-AA >60 >60 ml/min/1.73m2    Calcium 7.9 (L) 8.5 - 10.1 MG/DL    Bilirubin, total 0.4 0.2 - 1.0 MG/DL    ALT (SGPT) 12 12 - 78 U/L    AST (SGOT) 13 (L) 15 - 37 U/L    Alk. phosphatase 68 45 - 117 U/L    Protein, total 4.2 (L) 6.4 - 8.2 g/dL    Albumin 2.0 (L) 3.5 - 5.0 g/dL    Globulin 2.2 2.0 - 4.0 g/dL    A-G Ratio 0.9 (L) 1.1 - 2.2     HGB & HCT    Collection Time: 04/19/21 10:02 AM   Result Value Ref Range    HGB 8.6 (L) 11.5 - 16.0 g/dL    HCT 28.0 (L) 35.0 - 47.0 %   CBC WITH AUTOMATED DIFF    Collection Time: 04/20/21  4:10 AM   Result Value Ref Range    WBC 7.0 3.6 - 11.0 K/uL    RBC 3.31 (L) 3.80 - 5.20 M/uL    HGB 9.3 (L) 11.5 - 16.0 g/dL    HCT 30.7 (L) 35.0 - 47.0 %    MCV 92.7 80.0 - 99.0 FL    MCH 28.1 26.0 - 34.0 PG    MCHC 30.3 30.0 - 36.5 g/dL    RDW 15.0 (H) 11.5 - 14.5 %    PLATELET 280 193 - 432 K/uL    MPV 8.5 (L) 8.9 - 12.9 FL    NRBC 0.0 0  WBC    ABSOLUTE NRBC 0.00 0.00 - 0.01 K/uL    NEUTROPHILS 73 32 - 75 %    LYMPHOCYTES 20 12 - 49 %    MONOCYTES 6 5 - 13 %    EOSINOPHILS 1 0 - 7 %    BASOPHILS 0 0 - 1 %    IMMATURE GRANULOCYTES 0 0.0 - 0.5 %    ABS. NEUTROPHILS 5.0 1.8 - 8.0 K/UL    ABS. LYMPHOCYTES 1.4 0.8 - 3.5 K/UL    ABS. MONOCYTES 0.4 0.0 - 1.0 K/UL    ABS. EOSINOPHILS 0.1 0.0 - 0.4 K/UL    ABS. BASOPHILS 0.0 0.0 - 0.1 K/UL    ABS. IMM.  GRANS. 0.0 0.00 - 0.04 K/UL    DF AUTOMATED     METABOLIC PANEL, BASIC    Collection Time: 04/20/21  4:10 AM   Result Value Ref Range    Sodium 144 136 - 145 mmol/L    Potassium 3.9 3.5 - 5.1 mmol/L    Chloride 111 (H) 97 - 108 mmol/L    CO2 23 21 - 32 mmol/L    Anion gap 10 5 - 15 mmol/L    Glucose 44 (LL) 65 - 100 mg/dL    BUN 28 (H) 6 - 20 MG/DL    Creatinine 0.59 0.55 - 1.02 MG/DL    BUN/Creatinine ratio 47 (H) 12 - 20      GFR est AA >60 >60 ml/min/1.73m2    GFR est non-AA >60 >60 ml/min/1.73m2    Calcium 8.4 (L) 8.5 - 10.1 MG/DL   GLUCOSE, POC    Collection Time: 04/20/21  5:21 AM   Result Value Ref Range    Glucose (POC) 43 (LL) 65 - 100 mg/dL    Performed by [id number], POC    Collection Time: 04/20/21  5:23 AM   Result Value Ref Range    Glucose (POC) 51 (L) 65 - 100 mg/dL    Performed by [address], POC    Collection Time: 04/20/21  6:22 AM   Result Value Ref Range    Glucose (POC) 186 (H) 65 - 100 mg/dL    Performed by Rocky Stark (PCT)        Assessment:     Principal Problem:    Acute GI bleeding (4/18/2021)    Active Problems:    Coffee ground emesis (4/18/2021)      HTN (hypertension), benign (4/18/2021)      Hypothyroidism (4/18/2021)      Arthritis (4/18/2021)      Underweight (4/18/2021)        Plan:     1. Change pantoprazole from intravenous to oral formulation. 2.  Assuming she can tolerate enough oral intake for maintenance needs no endoscopic examination would be planned this hospital admission.

## 2021-04-20 NOTE — PROGRESS NOTES
0700 Shift report received from PATRICIA Perera. KAIDEN,[employer], I & O covered in report. Orders in for full liquid diet. 1020 In to assess pt. MD present at bedside. Per MD advance diet. Pt requesting crackers. Denies pain. Performed swab and sent to lab. Pt resting in bed. 1045 Telemetry called and said pt had heart block. MD notified. Orders received. 1500 Cardiology technician on floor with event monitor. Will come again tomorrow when pt is being discharged. 1600 Pt with decreased appetite. Will only eat iveth crackers. States \"it will hurt my stomach\" when other food is offered. 1900 Bedside shift change report given to Keesha (oncoming nurse) by Suha Griffin (offgoing nurse). Report included the following information KAIDEN, Johnny, Intake/Output, MAR and Cardiac Rhythm NSR, Sinus Antonino, Heart Block. 1950 Returned son's phone call. Son states that pt will go to [facility] and Anaid at the facility has requested someone call her to give her report so she can be ready for patient. [phone].

## 2021-04-20 NOTE — CONSULTS
[facility] [address] [id number]  Mobile [phone]    Cardiology Consult Note    Date of  Admission: 2021 10:43 AM  PCP- Haven Caro MD      Other, MD Haven  :  1930   MRN:  [id number]     CC: coffee ground emesis   Reason for consult: 3 second pause  Admission Diagnosis: Coffee ground emesis [K92.0]    Usha Hull is a 80 y.o. female admitted for Coffee ground emesis [K92.0]. Consult requested by James Valenzuela MD       Subjective:   Coffee ground emesis [K92.0]        Impression Plan/Recommendation   1. Acute GI bleed/anemia -POA  2. 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 3rd AV block 2.9 seconds  4. Hypothyroid   5. Hypertension                · Reviewed telemetry- mostly brief episodes of 2nd degree AV block, looks like 1 episode of 2nd deg type I and  Probable 1 episode of 3rd deg AV block lasting 2.9 seconds. Reportedly patient asymptomatic, incidental finding. · Spoke w the patients daughter in law. She has been living with them since February no reported unexplained falls or syncope. No MAXWELL that they have noticed. She did have a fall in January and went to rehab after- unwitnessed patient was home alone. · Echo now  · TSH wnl  · Recommend 30 day event monitor, discussed the need for ppm if any long pause /recurrent CHB or patient is symptomatic. · Avoid AV gregoria agents. Holding PTA norvasc d/t low BP           Pt personally seen and examined. Chart reviewed. Agree with advanced NP's history, exam and  A/P with changes/additons. 80year-old patient admitted for GI bleed. Incidental finding of arrhythmia on telemetry. Patient has dementia and cannot provide history. Denies chest pain. Blood pressure (!) 111/53, pulse 61, temperature 97.7 °F (36.5 °C), resp. rate 15, height 4' 8\" (1.422 m), weight 80 lb (36.3 kg), SpO2 92 %.       CVS- S1-S2 present,   2/6 systolic murmur present  RS-   CTAB ant    A/P -Arrhythmia-recheck EKG. Initial EKG consistent with A. Fib vs SR/PAC's/Artifact. Second-degree AV block on telemetry/? High-grade AV block. Patient not very active and is bedbound. Event monitor plus/minus PPM based on further recordings on event monitor. Currently not a candidate for anticoagulation - GI bleed. Rene Arias MD, [facility]      Subjective:  Chu Mccabe is a [address] y.[address]. female I am seeing for 3 second pause. PMhx includes hypertension and hypothyroid. The patient is here w an acute GI bleed. Cardiology consulted for an incidental finding of 2nd degree type II and 3rd degree AV block on telemetry. Patient is a poor historian d/t dementia. I spoke with her daughter in law. She has lived with her son and DIL since February, after completing rehab for a fall in January. She denies syncope, falls, MAXWLEL or chest pain. No hx of cardiac problems. Family hx: patient unable to provide.    Social hx:no etoh or tobacco      Past Medical History:   Diagnosis Date    Arthritis     Hypertension     Hypothyroidism       Past Surgical History:   Procedure Laterality Date    HX CHOLECYSTECTOMY      HX GYN      partial hysterectomy, uterine prolapse    HX HEENT      cataract    HX ORTHOPAEDIC Right     knee replacement       Hospital Medications:  Current Facility-Administered Medications   Medication Dose Route Frequency    glucose chewable tablet 16 g  4 Tab Oral PRN    dextrose (D50W) injection syrg 12.5-25 g  12.5-25 g IntraVENous PRN    glucagon (GLUCAGEN) injection 1 mg  1 mg IntraMUSCular PRN    dextrose 5 % - 0.45% NaCl infusion  100 mL/hr IntraVENous CONTINUOUS    cefTRIAXone (ROCEPHIN) 1 g in sterile water (preservative free) 10 mL IV syringe  1 g IntraVENous Q24H    pantoprazole (PROTONIX) tablet 40 mg  40 mg Oral ACB&D    sodium chloride (NS) flush 5-40 mL  5-40 mL IntraVENous Q8H    sodium chloride (NS) flush 5-40 mL  5-40 mL IntraVENous PRN    acetaminophen (TYLENOL) tablet 650 mg  650 mg Oral Q6H PRN    Or    acetaminophen (TYLENOL) suppository 650 mg  650 mg Rectal Q6H PRN    polyethylene glycol (MIRALAX) packet 17 g  17 g Oral DAILY PRN    senna (SENOKOT) tablet 8.6 mg  1 Tab Oral DAILY PRN    promethazine (PHENERGAN) tablet 12.5 mg  12.5 mg Oral Q6H PRN    Or    ondansetron (ZOFRAN) injection 4 mg  4 mg IntraVENous Q6H PRN    0.9% sodium chloride infusion 250 mL  250 mL IntraVENous PRN     No current facility-administered medications on file prior to encounter. Current Outpatient Medications on File Prior to Encounter   Medication Sig Dispense Refill    amLODIPine (NORVASC) 5 mg tablet Take 5 mg by mouth daily.  levothyroxine (SYNTHROID) 50 mcg tablet Take 50 mcg by mouth Daily (before breakfast).  aspirin delayed-release 81 mg tablet Take 81 mg by mouth daily.  diclofenac EC (VOLTAREN) 50 mg EC tablet Take 50 mg by mouth daily. Allergies   Allergen Reactions    Pcn [Penicillins] Unknown (comments)    Statins-Hmg-Coa Reductase Inhibitors Unknown (comments)    Warfarin Unknown (comments)             Review of Symptoms:  All other systems reviewed are negative except as above. Physical Exam    Visit Vitals  BP (!) 111/53 (BP 1 Location: Right upper arm, BP Patient Position: At rest)   Pulse 70   Temp 97.7 °F (36.5 °C)   Resp 15   Ht 4' 7.98\" (1.422 m)   Wt 80 lb (36.3 kg)   SpO2 92%   BMI 17.95 kg/m²     General Appearance:  Well developed, thin, frail,alert and oriented x 1, and individual in no acute distress. Ears/Nose/Mouth/Throat:   Hearing grossly normal.Normal oral mucosa,no scleral icterus     Neck: Supple no JVD    Chest:   Lungs clear to auscultation bilaterally,no rales rhonchi or wheezing   Cardiovascular:  Regular rate and rhythm, 2/6 Murmur. Abdomen:   Soft, non-tender    Extremities: No edema bilaterally. Pulses detected, no varicosities   Skin: Warm and dry.  No bruising  Neuro  Moves all extermities and neurologically intact                                                       Cardiographics    Telemetry: NSR- 2nd deg AV block type 2 -intermittent. 1 episode of 3rd AV block 2.9 seconds  ECG: NSR w PACs- difficult to interpret d/t artifact    Cardiac testing      Recent Labs     04/18/21  1126   TROIQ <0.05       Recent Labs     04/20/21  0410 04/19/21  1002 04/19/21  1000 04/18/21 2008 04/18/21  1126     --  143  --  139   K 3.9  --  4.7  --  4.6   CO2 23  --  28  --  29   BUN 28*  --  30*  --  37*   CREA 0.59  --  0.62  --  0.91   GLU 44*  --  76  --  134*   WBC 7.0  --   --   --  12.4*   HGB 9.3* 8.6*  --  6.1* 8.0*   HCT 30.7* 28.0*  --  19.8* 26.3*     --   --   --  371       I have discussed the diagnosis with the patient and the intended plan as seen in the above orders. Questions were answered concerning future plans. I have discussed medication side effects and warnings with the patient as well. Rhianna Woodruff is in agreement to the plan listed above and wishes to proceed. she  was instructed not to smoke, eat heart healthy diet  and to exercise. Thank you for this consult.     Jamel Maynard NP

## 2021-04-20 NOTE — PROGRESS NOTES
[address] Adult  Hospitalist Group                                                                                          Hospitalist Progress Note  Luz Marina Emmanuel MD        Date of Service:  2021  NAME:  Jamila Calix  :  1930  MRN:  [id number]      Admission Summary:   81 yo female presented to the ED with persistent episodes of coffee ground emesis. Interval history / Subjective:    states she is hungry and mouth is dry     Assessment & Plan:     Acute GI bleeding (2021) / Coffee ground emesis (2021) POA:   -likely due to NSAIDs induced PUD. -h/h stable after receiving transfusion   -GI evaluated, holding off of EGD until goals of care clarified. Spoke with patient's son, he would like to take a conservative approach and monitor hgb. So far has remained stable post transfusion. Arrhythmia/afib  -concern for 2nd or 3rd degree heart block  -no meds for afib other than asa  -cadriology consulted     HTN (hypertension), benign (2021) POA:   -Hold BP medications for now given likely drop if she continued to bleed. -IV Hydralazine PRN     Hypothyroidism (2021) POA   -TSH normal.   -cont LT4     Anemia POA: unclear baseline.   -Likely due to blood loss. -monitor and transfuse if h/h drop <7     Arthritis (2021) POA: Tylenol PRN     Underweight (2021) POA: likely from advanced age, poor intake vs other.  Diet as tolerated    Code status: full  DVT prophylaxis: none       Hospital Problems  Date Reviewed: 2021          Codes Class Noted POA    Coffee ground emesis ICD-10-CM: K92.0  ICD-9-CM: 578.0  2021 Yes        * (Principal) Acute GI bleeding ICD-10-CM: K92.2  ICD-9-CM: 578.9  2021 Yes        HTN (hypertension), benign ICD-10-CM: I10  ICD-9-CM: 401.1  2021 Yes        Hypothyroidism ICD-10-CM: E03.9  ICD-9-CM: 244.9  2021 Yes        Arthritis ICD-10-CM: M19.90  ICD-9-CM: 716.90  2021 Yes        Underweight ICD-10-CM: R63.6  ICD-9-CM: 783.22  4/18/2021 Yes                Review of Systems:   A comprehensive review of systems was negative except for that written in the HPI. Vital Signs:    Last 24hrs VS reviewed since prior progress note. Most recent are:  Visit Vitals  /67 (BP 1 Location: Left upper arm, BP Patient Position: At rest)   Pulse (!) 39   Temp 97.9 °F (36.6 °C)   Resp 15   Ht 4' 7.98\" (1.422 m)   Wt 36.3 kg (80 lb)   SpO2 98%   BMI 17.95 kg/m²         Intake/Output Summary (Last 24 hours) at 4/20/2021 1113  Last data filed at 4/19/2021 1230  Gross per 24 hour   Intake 0 ml   Output    Net 0 ml        Physical Examination:     General:  Weak, cachectic looking elderly patient, in no acute distress  Eyes: Pale conjunctivae, PERRLA with no discharge. Normal eye movements  Ear, Nose, Mouth & Throat: No ottorrhea, rhinorrhea, non tender sinuses, dry mucous membranes  Respiratory:  No accessory muscle use, clear breath sounds without crackles or wheezes  Cardiovascular:  No JVD or murmurs, regular and normal S1, S2 without thrills, bruits. + peripheral edema. GI & :  Soft abdomen, non-tender, non-distended, normoactive bowel sounds with no palpable organomegaly  Heme:  No cervical or axillary adenopathy. Musculoskeletal:  No cyanosis, clubbing but generalized muscular atrophy   Skin:  No rashes, bruising or ulcers   Neurological: Awake and alert, speech is clear but slow. Generally frail but no focal deficits   Psychiatric:  Has little insight to her illness     Data Review:    Review and/or order of clinical lab test      Labs:     Recent Labs     04/20/21  0410 04/19/21  1002 04/18/21  1126 04/18/21  1126   WBC 7.0  --   --  12.4*   HGB 9.3* 8.6*   < > 8.0*   HCT 30.7* 28.0*   < > 26.3*     --   --  371    < > = values in this interval not displayed.      Recent Labs     04/20/21  0410 04/19/21  1000 04/18/21  1126    143 139   K 3.9 4.7 4.6   * 111* 107   CO2 23 28 29   BUN 28* 30* 37*   CREA 0.59 0.62 0.91   GLU 44* 76 134*   CA 8.4* 7.9* 7.6*     Recent Labs     04/19/21  1000 04/18/21  1126   ALT 12 15   AP 68 87   TBILI 0.4 0.2   TP 4.2* 4.9*   ALB 2.0* 2.1*   GLOB 2.2 2.8     Recent Labs     04/18/21  1126   INR 1.1   PTP 11.8*   APTT 23.4      Recent Labs     04/18/21  1126   TIBC 226*   PSAT 11*      Lab Results   Component Value Date/Time    Folate 6.8 04/18/2021 11:26 AM      No results for input(s): PH, PCO2, PO2 in the last 72 hours.   Recent Labs     04/18/21  1126   TROIQ <0.05     No results found for: CHOL, CHOLX, CHLST, CHOLV, HDL, HDLP, LDL, LDLC, DLDLP, TGLX, TRIGL, TRIGP, CHHD, CHHDX  Lab Results   Component Value Date/Time    Glucose (POC) 186 (H) 04/20/2021 06:22 AM    Glucose (POC) 51 (L) 04/20/2021 05:23 AM    Glucose (POC) 43 (LL) 04/20/2021 05:21 AM     Lab Results   Component Value Date/Time    Color YELLOW/STRAW 04/18/2021 10:00 AM    Appearance CLOUDY (A) 04/18/2021 10:00 AM    Specific gravity 1.015 04/18/2021 10:00 AM    pH (UA) 5.0 04/18/2021 10:00 AM    Protein Negative 04/18/2021 10:00 AM    Glucose Negative 04/18/2021 10:00 AM    Ketone Negative 04/18/2021 10:00 AM    Bilirubin Negative 04/18/2021 10:00 AM    Urobilinogen 0.2 04/18/2021 10:00 AM    Nitrites Positive (A) 04/18/2021 10:00 AM    Leukocyte Esterase MODERATE (A) 04/18/2021 10:00 AM    Epithelial cells FEW 04/18/2021 10:00 AM    Bacteria 4+ (A) 04/18/2021 10:00 AM    WBC 10-20 04/18/2021 10:00 AM    RBC 0-5 04/18/2021 10:00 AM         Medications Reviewed:     Current Facility-Administered Medications   Medication Dose Route Frequency    glucose chewable tablet 16 g  4 Tab Oral PRN    dextrose (D50W) injection syrg 12.5-25 g  12.5-25 g IntraVENous PRN    glucagon (GLUCAGEN) injection 1 mg  1 mg IntraMUSCular PRN    dextrose 5 % - 0.45% NaCl infusion  100 mL/hr IntraVENous CONTINUOUS    cefTRIAXone (ROCEPHIN) 1 g in sterile water (preservative free) 10 mL IV syringe  1 g IntraVENous Q24H  pantoprazole (PROTONIX) tablet 40 mg  40 mg Oral ACB&D    sodium chloride (NS) flush 5-40 mL  5-40 mL IntraVENous Q8H    sodium chloride (NS) flush 5-40 mL  5-40 mL IntraVENous PRN    acetaminophen (TYLENOL) tablet 650 mg  650 mg Oral Q6H PRN    Or    acetaminophen (TYLENOL) suppository 650 mg  650 mg Rectal Q6H PRN    polyethylene glycol (MIRALAX) packet 17 g  17 g Oral DAILY PRN    senna (SENOKOT) tablet 8.6 mg  1 Tab Oral DAILY PRN    promethazine (PHENERGAN) tablet 12.5 mg  12.5 mg Oral Q6H PRN    Or    ondansetron (ZOFRAN) injection 4 mg  4 mg IntraVENous Q6H PRN    0.9% sodium chloride infusion 250 mL  250 mL IntraVENous PRN     ______________________________________________________________________  EXPECTED LENGTH OF STAY: 3d 0h  ACTUAL LENGTH OF STAY:          2                 Liss Woodward MD

## 2021-04-21 LAB
ANION GAP SERPL CALC-SCNC: 5 MMOL/L (ref 5–15)
BASOPHILS # BLD: 0 K/UL (ref 0–0.1)
BASOPHILS NFR BLD: 1 % (ref 0–1)
BUN SERPL-MCNC: 18 MG/DL (ref 6–20)
BUN/CREAT SERPL: 32 (ref 12–20)
CALCIUM SERPL-MCNC: 7.5 MG/DL (ref 8.5–10.1)
CHLORIDE SERPL-SCNC: 110 MMOL/L (ref 97–108)
CO2 SERPL-SCNC: 26 MMOL/L (ref 21–32)
CREAT SERPL-MCNC: 0.56 MG/DL (ref 0.55–1.02)
DIFFERENTIAL METHOD BLD: ABNORMAL
EOSINOPHIL # BLD: 0.2 K/UL (ref 0–0.4)
EOSINOPHIL NFR BLD: 4 % (ref 0–7)
ERYTHROCYTE [DISTWIDTH] IN BLOOD BY AUTOMATED COUNT: 14.7 % (ref 11.5–14.5)
GLUCOSE BLD STRIP.AUTO-MCNC: 113 MG/DL (ref 65–100)
GLUCOSE BLD STRIP.AUTO-MCNC: 127 MG/DL (ref 65–100)
GLUCOSE SERPL-MCNC: 87 MG/DL (ref 65–100)
HCT VFR BLD AUTO: 26.5 % (ref 35–47)
HGB BLD-MCNC: 8 G/DL (ref 11.5–16)
IMM GRANULOCYTES # BLD AUTO: 0 K/UL (ref 0–0.04)
IMM GRANULOCYTES NFR BLD AUTO: 0 % (ref 0–0.5)
LYMPHOCYTES # BLD: 1.7 K/UL (ref 0.8–3.5)
LYMPHOCYTES NFR BLD: 30 % (ref 12–49)
MCH RBC QN AUTO: 28.1 PG (ref 26–34)
MCHC RBC AUTO-ENTMCNC: 30.2 G/DL (ref 30–36.5)
MCV RBC AUTO: 93 FL (ref 80–99)
MONOCYTES # BLD: 0.5 K/UL (ref 0–1)
MONOCYTES NFR BLD: 9 % (ref 5–13)
NEUTS SEG # BLD: 3.1 K/UL (ref 1.8–8)
NEUTS SEG NFR BLD: 56 % (ref 32–75)
NRBC # BLD: 0 K/UL (ref 0–0.01)
NRBC BLD-RTO: 0 PER 100 WBC
PLATELET # BLD AUTO: 277 K/UL (ref 150–400)
PMV BLD AUTO: 8.6 FL (ref 8.9–12.9)
POTASSIUM SERPL-SCNC: 3.2 MMOL/L (ref 3.5–5.1)
RBC # BLD AUTO: 2.85 M/UL (ref 3.8–5.2)
SARS-COV-2, XPLCVT: NOT DETECTED
SERVICE CMNT-IMP: ABNORMAL
SERVICE CMNT-IMP: ABNORMAL
SODIUM SERPL-SCNC: 141 MMOL/L (ref 136–145)
SOURCE, COVRS: NORMAL
WBC # BLD AUTO: 5.5 K/UL (ref 3.6–11)

## 2021-04-21 PROCEDURE — 82962 GLUCOSE BLOOD TEST: CPT

## 2021-04-21 PROCEDURE — 74011000250 HC RX REV CODE- 250: Performed by: FAMILY MEDICINE

## 2021-04-21 PROCEDURE — 74011000250 HC RX REV CODE- 250: Performed by: INTERNAL MEDICINE

## 2021-04-21 PROCEDURE — 74011250637 HC RX REV CODE- 250/637: Performed by: INTERNAL MEDICINE

## 2021-04-21 PROCEDURE — 99231 SBSQ HOSP IP/OBS SF/LOW 25: CPT | Performed by: INTERNAL MEDICINE

## 2021-04-21 PROCEDURE — 97535 SELF CARE MNGMENT TRAINING: CPT

## 2021-04-21 PROCEDURE — 97530 THERAPEUTIC ACTIVITIES: CPT

## 2021-04-21 PROCEDURE — 80048 BASIC METABOLIC PNL TOTAL CA: CPT

## 2021-04-21 PROCEDURE — 74011250636 HC RX REV CODE- 250/636: Performed by: FAMILY MEDICINE

## 2021-04-21 PROCEDURE — 85025 COMPLETE CBC W/AUTO DIFF WBC: CPT

## 2021-04-21 PROCEDURE — 36415 COLL VENOUS BLD VENIPUNCTURE: CPT

## 2021-04-21 PROCEDURE — 65660000000 HC RM CCU STEPDOWN

## 2021-04-21 RX ORDER — POTASSIUM CHLORIDE 750 MG/1
40 TABLET, FILM COATED, EXTENDED RELEASE ORAL
Status: COMPLETED | OUTPATIENT
Start: 2021-04-21 | End: 2021-04-21

## 2021-04-21 RX ORDER — AMLODIPINE BESYLATE 5 MG/1
5 TABLET ORAL DAILY
Status: DISCONTINUED | OUTPATIENT
Start: 2021-04-22 | End: 2021-04-21

## 2021-04-21 RX ORDER — LEVOTHYROXINE SODIUM 50 UG/1
50 TABLET ORAL
Status: DISCONTINUED | OUTPATIENT
Start: 2021-04-22 | End: 2021-04-25 | Stop reason: HOSPADM

## 2021-04-21 RX ADMIN — POTASSIUM CHLORIDE 40 MEQ: 750 TABLET, FILM COATED, EXTENDED RELEASE ORAL at 17:45

## 2021-04-21 RX ADMIN — ACETAMINOPHEN 650 MG: 325 TABLET ORAL at 23:24

## 2021-04-21 RX ADMIN — Medication 10 ML: at 16:43

## 2021-04-21 RX ADMIN — Medication 10 ML: at 23:24

## 2021-04-21 RX ADMIN — POTASSIUM CHLORIDE 40 MEQ: 750 TABLET, FILM COATED, EXTENDED RELEASE ORAL at 08:49

## 2021-04-21 RX ADMIN — CEFTRIAXONE 1 G: 1 INJECTION, POWDER, FOR SOLUTION INTRAMUSCULAR; INTRAVENOUS at 08:49

## 2021-04-21 RX ADMIN — Medication 10 ML: at 07:11

## 2021-04-21 RX ADMIN — PANTOPRAZOLE SODIUM 40 MG: 40 TABLET, DELAYED RELEASE ORAL at 16:42

## 2021-04-21 RX ADMIN — PANTOPRAZOLE SODIUM 40 MG: 40 TABLET, DELAYED RELEASE ORAL at 07:10

## 2021-04-21 RX ADMIN — DEXTROSE AND SODIUM CHLORIDE 100 ML/HR: 5; 450 INJECTION, SOLUTION INTRAVENOUS at 16:43

## 2021-04-21 NOTE — PROGRESS NOTES
PHYSICAL THERAPY:Pt pleasantly declined ambulating with PT after encouragement. PT will continue to follow.

## 2021-04-21 NOTE — PROGRESS NOTES
Problem: Self Care Deficits Care Plan (Adult)  Goal: *Acute Goals and Plan of Care (Insert Text)  Description: FUNCTIONAL STATUS PRIOR TO ADMISSION: Pt is a questionable historian. Confused during questioning but reports today that prior to admission she was living with her son and daughter in law and used a rollator for mobility. Denies recent falls and states that she has not left the house and requires assistance for ADLs. HOME SUPPORT PRIOR TO ADMISSION: The patient lived with son and daughter in law and required assistance for ADLs. Unclear assistance level needed for transfers and gait. Occupational Therapy Goals  Initiated 4/19/2021  1. Patient will perform grooming with supervision/set-up with minimal verbal cues within 7 day(s). 2.  Patient will perform upper body dressing with minimal assistance/contact guard assist within 7 day(s). 3.  Patient will perform lower body dressing with moderate assistance within 7 day(s). 4.  Patient will perform all aspects of toileting with minimal assistance/contact guard assist within 7 day(s). 5.  Patient will participate in upper extremity therapeutic exercise/activities with minimal assistance/contact guard assist for 5 minutes within 7 day(s). Outcome: Progressing Towards Goal     OCCUPATIONAL THERAPY TREATMENT  Patient: Jamila Calix (80 y.o. female)  Date: 4/21/2021  Diagnosis: Coffee ground emesis [K92.0] Acute GI bleeding       Precautions:    Chart, occupational therapy assessment, plan of care, and goals were reviewed. ASSESSMENT  Patient continues with skilled OT services and is slowly progressing towards goals. Pt continues to present with confusion, impaired balance, decreased awareness, and generalized weakness. Pt oriented to self only but is pleasant and cooperative. She requires increased time for task completion and reports slight dizziness during activity this session; BP stable.  During standing at RW, pt requires max A for perineal bathing prior to completing transfer to chair. Continue to recommend 24 hour supervision/assistance at discharge. Per CM, family looking into transition to [facility]. Current Level of Function Impacting Discharge (ADLs): set up to max A for ADLs; CGA to min A for ADL transfers    Other factors to consider for discharge: high fall risk; confusion; pleasant; supportive family         PLAN :  Patient continues to benefit from skilled intervention to address the above impairments. Continue treatment per established plan of care to address goals. Recommend with staff: OOB to chair for meals; Assist for ADLs    Recommend next OT session: continue with ADLs as able    Recommendation for discharge: (in order for the patient to meet his/her long term goals)  Therapy up to 5 days/week in SNF setting or an intensive home health therapy program with 24 hour supervision and physical assistance for ADLs and mobility    This discharge recommendation:  Has been made in collaboration with the attending provider and/or case management    IF patient discharges home will need the following DME: TBD       SUBJECTIVE:   Patient stated I can try.     OBJECTIVE DATA SUMMARY:   Cognitive/Behavioral Status:  Neurologic State: Alert;Confused  Orientation Level: Oriented to person;Disoriented to place; Disoriented to situation;Disoriented to time  Cognition: Follows commands  Perception: Appears intact  Perseveration: No perseveration noted  Safety/Judgement: Decreased awareness of environment; Fall prevention    Functional Mobility and Transfers for ADLs:  Bed Mobility:  Supine to Sit: Contact guard assistance  Sit to Supine: (pt seated up in chair at end of session)    Transfers:  Sit to Stand: Minimum assistance;Assist x1  Bed to Chair: Contact guard assistance;Minimum assistance(with rolling walker)    Balance:  Sitting: Intact  Standing: Impaired; With support  Standing - Static: Fair  Standing - Dynamic : Fair;Constant support    ADL Intervention:  Grooming  Grooming Assistance: Set-up  Position Performed: Seated in chair  Washing Hands: Set-up(seated in chair)  Cues: Verbal cues provided    Lower Body Bathing  Perineal  : Maximum assistance(while pt standing at RW)  Position Performed: Standing(with rolling walker)  Cues: Tactile cues provided;Verbal cues provided(physical assistance)  Adaptive Equipment: [place]: Minimum  assistance; Moderate assistance  Cues: Physical assistance; Tactile cues provided;Verbal cues provided;Visual cues provided    Cognitive Retraining  Safety/Judgement: Decreased awareness of environment; Fall prevention    Pain:  Pt reporting minimal pain    Activity Tolerance:   Fair    After treatment patient left in no apparent distress:   Sitting in chair, Call bell within reach and Bed / chair alarm activated    COMMUNICATION/COLLABORATION:   The patients plan of care was discussed with: Physical therapist and Registered nurse.      Joe Maldonado OT  Time Calculation: 29 mins

## 2021-04-21 NOTE — PROGRESS NOTES
Spoke to Leticia Adams. No current DC orders.  Noel Cason will let me know if DC comes through before 4pm.

## 2021-04-21 NOTE — PROGRESS NOTES
Gastrointestinal Progress Note    4/21/2021    Admit Date: 4/18/2021    Subjective:     New Complaints Today:  None    Pain: Pt denies any pain today. Tolerating clear liquids and crackers. Bowel Movements: none reported, limited hx due to poor historian    Bleeding:  None reported by pt    Current Facility-Administered Medications   Medication Dose Route Frequency    glucose chewable tablet 16 g  4 Tab Oral PRN    dextrose (D50W) injection syrg 12.5-25 g  12.5-25 g IntraVENous PRN    glucagon (GLUCAGEN) injection 1 mg  1 mg IntraMUSCular PRN    dextrose 5 % - 0.45% NaCl infusion  100 mL/hr IntraVENous CONTINUOUS    cefTRIAXone (ROCEPHIN) 1 g in sterile water (preservative free) 10 mL IV syringe  1 g IntraVENous Q24H    pantoprazole (PROTONIX) tablet 40 mg  40 mg Oral ACB&D    sodium chloride (NS) flush 5-40 mL  5-40 mL IntraVENous Q8H    sodium chloride (NS) flush 5-40 mL  5-40 mL IntraVENous PRN    acetaminophen (TYLENOL) tablet 650 mg  650 mg Oral Q6H PRN    Or    acetaminophen (TYLENOL) suppository 650 mg  650 mg Rectal Q6H PRN    polyethylene glycol (MIRALAX) packet 17 g  17 g Oral DAILY PRN    senna (SENOKOT) tablet 8.6 mg  1 Tab Oral DAILY PRN    promethazine (PHENERGAN) tablet 12.5 mg  12.5 mg Oral Q6H PRN    Or    ondansetron (ZOFRAN) injection 4 mg  4 mg IntraVENous Q6H PRN    0.9% sodium chloride infusion 250 mL  250 mL IntraVENous PRN        Objective:     Blood pressure (!) 152/78, pulse 67, temperature 97.8 °F (36.6 °C), resp. rate 16, height 4' 8\" (1.422 m), weight 80 lb (36.3 kg), SpO2 92 %.    04/21 0701 - 04/21 1900  In: 40 [P.O.:40]  Out: 350 [Urine:350]    04/19 1901 - 04/21 0700  In: 2415 [P.O.:200;  I.V.:2215]  Out: 950 [Urine:950]    EXAM:  GENERAL: no distress, HEART: regular rate and rhythm, S1, S2 normal, no murmur, click, rub or gallop, LUNGS: chest clear, no wheezing, rales, normal symmetric air entry, ABDOMEN:  Bowel sounds are normal, liver is not enlarged, spleen is not enlarged and EXTREMITY: extremities normal, atraumatic, no cyanosis or edema      Data Review    Recent Results (from the past 24 hour(s))   ECHO ADULT COMPLETE    Collection Time: 04/20/21  2:24 PM   Result Value Ref Range    IVSd 1.18 (A) 0.60 - 0.90 cm    LVIDd 3.58 (A) 3.90 - 5.30 cm    LVIDs 2.20 cm    LVOT d 1.99 cm    LVPWd 1.22 (A) 0.60 - 0.90 cm    LVOT Peak Gradient 3.69 mmHg    LVOT Peak Gradient 4.46 mmHg    Left Ventricular Outflow Tract Mean Gradient 1.95 mmHg    LVOT SV 70.7 mL    LVOT Peak Velocity 96.09 cm/s    LVOT Peak Velocity 105.54 cm/s    LVOT VTI 22.73 cm    RVIDd 4.34 cm    Left Atrium Major Axis 3.70 cm    LA Volume 49.10 22.0 - 52.0 mL    LA Vol 2C 45.38 22.00 - 52.00 mL    LA Vol 4C 41.26 22.00 - 52.00 mL    Aortic Valve Area by Continuity of Peak Velocity 1.72 cm2    Aortic Valve Area by Continuity of Peak Velocity 1.88 cm2    Aortic Valve Area by Continuity of Peak Velocity 1.98 cm2    Aortic Valve Area by Continuity of Peak Velocity 1.80 cm2    Aortic Valve Area by Continuity of VTI 2.12 cm2    AV R PG 43.76 mmHg    Aortic Regurgitant Pressure Half-time 519.26 ms    AR Max Jerry 330.75 cm/s    AoV PG 12.15 mmHg    AoV PG 10.99 mmHg    Aortic Valve Systolic Mean Gradient 7.83 mmHg    Aortic Valve Systolic Peak Velocity 062.04 cm/s    Aortic Valve Systolic Peak Velocity 731.87 cm/s    AoV VTI 33.43 cm    MV A Jerry 106.06 cm/s    Mitral Valve E Wave Deceleration Time 304.15 ms    MV E Jerry 69.48 cm/s    Pulmonic Regurgitant End Max Velocity 100.65 cm/s    Pulmonic Valve Systolic Peak Instantaneous Gradient 5.30 mmHg    Triscuspid Valve Regurgitation Peak Gradient 19.15 mmHg    TR Max Velocity 218.82 cm/s    AO ASC D 3.34 cm    Ao Root D 3.55 cm    IVC proximal 1.45 cm    MV E/A 0.66     LV Mass .3 67.0 - 162.0 g    LV Mass AL Index 116.7 43.0 - 95.0 g/m2    Left Atrium Minor Axis 3.08 cm    LA Vol Index 40.82 16.00 - 28.00 ml/m2    LA Vol Index 37.73 16.00 - 28.00 ml/m2    LA Vol Index 34.30 16.00 - 28.00 ml/m2    WANG/BSA VTI 1.8 cm2/m2   GLUCOSE, POC    Collection Time: 04/20/21  5:12 PM   Result Value Ref Range    Glucose (POC) 107 (H) 65 - 100 mg/dL    Performed by Ronel Reyes    GLUCOSE, POC    Collection Time: 04/20/21  9:28 PM   Result Value Ref Range    Glucose (POC) 104 (H) 65 - 100 mg/dL    Performed by Elena Bonilla (PCT)    CBC WITH AUTOMATED DIFF    Collection Time: 04/21/21  5:23 AM   Result Value Ref Range    WBC 5.5 3.6 - 11.0 K/uL    RBC 2.85 (L) 3.80 - 5.20 M/uL    HGB 8.0 (L) 11.5 - 16.0 g/dL    HCT 26.5 (L) 35.0 - 47.0 %    MCV 93.0 80.0 - 99.0 FL    MCH 28.1 26.0 - 34.0 PG    MCHC 30.2 30.0 - 36.5 g/dL    RDW 14.7 (H) 11.5 - 14.5 %    PLATELET 226 745 - 838 K/uL    MPV 8.6 (L) 8.9 - 12.9 FL    NRBC 0.0 0  WBC    ABSOLUTE NRBC 0.00 0.00 - 0.01 K/uL    NEUTROPHILS 56 32 - 75 %    LYMPHOCYTES 30 12 - 49 %    MONOCYTES 9 5 - 13 %    EOSINOPHILS 4 0 - 7 %    BASOPHILS 1 0 - 1 %    IMMATURE GRANULOCYTES 0 0.0 - 0.5 %    ABS. NEUTROPHILS 3.1 1.8 - 8.0 K/UL    ABS. LYMPHOCYTES 1.7 0.8 - 3.5 K/UL    ABS. MONOCYTES 0.5 0.0 - 1.0 K/UL    ABS. EOSINOPHILS 0.2 0.0 - 0.4 K/UL    ABS. BASOPHILS 0.0 0.0 - 0.1 K/UL    ABS. IMM.  GRANS. 0.0 0.00 - 0.04 K/UL    DF AUTOMATED     METABOLIC PANEL, BASIC    Collection Time: 04/21/21  5:23 AM   Result Value Ref Range    Sodium 141 136 - 145 mmol/L    Potassium 3.2 (L) 3.5 - 5.1 mmol/L    Chloride 110 (H) 97 - 108 mmol/L    CO2 26 21 - 32 mmol/L    Anion gap 5 5 - 15 mmol/L    Glucose 87 65 - 100 mg/dL    BUN 18 6 - 20 MG/DL    Creatinine 0.56 0.55 - 1.02 MG/DL    BUN/Creatinine ratio 32 (H) 12 - 20      GFR est AA >60 >60 ml/min/1.73m2    GFR est non-AA >60 >60 ml/min/1.73m2    Calcium 7.5 (L) 8.5 - 10.1 MG/DL       Assessment:     Principal Problem:    Acute GI bleeding (4/18/2021)    Active Problems:    Coffee ground emesis (4/18/2021)      HTN (hypertension), benign (4/18/2021)      Hypothyroidism (4/18/2021)      Arthritis (4/18/2021)      Underweight (4/18/2021)        Plan:     1. Acute GIB - Continue oral PPI, Hgb with mild decrease from yesterday, but receiving fluids with global cell line decrease could be 2/2 dilution. Continue monitoring Hgb, no acute need for EGD at this time. 2.  CGE - no repeat episodes in 24 hours. Tolerating regular diet without emesis.     Sharon Neal MD  Family Medicine Resident

## 2021-04-21 NOTE — PROGRESS NOTES
Rounded on Christian patients and provided Anointing of the Sick at request of patient.     Siddhartha Garza

## 2021-04-21 NOTE — PROGRESS NOTES
[facility]  [address] [facility], [address] [place] [address]  [phone]      Medical Progress Note      NAME: Usha Hull   :  1930  MRM:  [id number]    Date/Time of service: 2021  4:56 PM       Subjective:     Chief Complaint:  Patient was personally seen and examined by me during this time period. Chart reviewed. Unable to obtain reliable ROS due to dementia. Objective:       Vitals:       Last 24hrs VS reviewed since prior progress note.  Most recent are:    Visit Vitals  /71 (BP 1 Location: Right upper arm, BP Patient Position: Sitting)   Pulse 75   Temp 98.7 °F (37.1 °C)   Resp 16   Ht 4' 8\" (1.422 m)   Wt 36.3 kg (80 lb)   SpO2 97%   BMI 17.94 kg/m²     SpO2 Readings from Last 6 Encounters:   21 97%            Intake/Output Summary (Last 24 hours) at 2021 1656  Last data filed at 2021 0912  Gross per 24 hour   Intake 2455 ml   Output 950 ml   Net 1505 ml        Exam:     Physical Exam:    Gen:  frail, in no acute distress  HEENT:  Pink conjunctivae, PERRL, hearing intact to voice  Resp:  No accessory muscle use, clear breath sounds without wheezes rales or rhonchi  Card:  RRR, No murmurs, normal S1, S2, b/l peripheral edema  Abd:  Soft, non-tender, non-distended, normoactive bowel sounds are present  Musc:  No cyanosis or clubbing  Skin:  skin turgor is good  Neuro:  Cranial nerves 3-12 are grossly intact, diffuse weaknesses but non focal  Psych:  Poor insight      Medications Reviewed: (see below)    Lab Data Reviewed: (see below)    ______________________________________________________________________    Medications:     Current Facility-Administered Medications   Medication Dose Route Frequency    [START ON 2021] amLODIPine (NORVASC) tablet 5 mg  5 mg Oral DAILY    [START ON 2021] levothyroxine (SYNTHROID) tablet 50 mcg  50 mcg Oral ACB    glucose chewable tablet 16 g  4 Tab Oral PRN    dextrose (D50W) injection syrg 12.5-25 g  12.5-25 g IntraVENous PRN    glucagon (GLUCAGEN) injection 1 mg  1 mg IntraMUSCular PRN    cefTRIAXone (ROCEPHIN) 1 g in sterile water (preservative free) 10 mL IV syringe  1 g IntraVENous Q24H    pantoprazole (PROTONIX) tablet 40 mg  40 mg Oral ACB&D    sodium chloride (NS) flush 5-40 mL  5-40 mL IntraVENous Q8H    sodium chloride (NS) flush 5-40 mL  5-40 mL IntraVENous PRN    acetaminophen (TYLENOL) tablet 650 mg  650 mg Oral Q6H PRN    Or    acetaminophen (TYLENOL) suppository 650 mg  650 mg Rectal Q6H PRN    polyethylene glycol (MIRALAX) packet 17 g  17 g Oral DAILY PRN    senna (SENOKOT) tablet 8.6 mg  1 Tab Oral DAILY PRN    promethazine (PHENERGAN) tablet 12.5 mg  12.5 mg Oral Q6H PRN    Or    ondansetron (ZOFRAN) injection 4 mg  4 mg IntraVENous Q6H PRN    0.9% sodium chloride infusion 250 mL  250 mL IntraVENous PRN          Lab Review:     Recent Labs     04/21/21  0523 04/20/21  0410 04/19/21  1002   WBC 5.5 7.0  --    HGB 8.0* 9.3* 8.6*   HCT 26.5* 30.7* 28.0*    291  --      Recent Labs     04/21/21  0523 04/20/21  0410 04/19/21  1000    144 143   K 3.2* 3.9 4.7   * 111* 111*   CO2 26 23 28   GLU 87 44* 76   BUN 18 28* 30*   CREA 0.56 0.59 0.62   CA 7.5* 8.4* 7.9*   ALB  --   --  2.0*   TBILI  --   --  0.4   ALT  --   --  12     Lab Results   Component Value Date/Time    Glucose (POC) 113 (H) 04/21/2021 04:06 PM    Glucose (POC) 127 (H) 04/21/2021 12:30 PM    Glucose (POC) 104 (H) 04/20/2021 09:28 PM    Glucose (POC) 107 (H) 04/20/2021 05:12 PM    Glucose (POC) 186 (H) 04/20/2021 06:22 AM          Assessment / Plan:     Acute GI bleeding (4/18/2021) / Coffee ground emesis (4/18/2021) POA:   -likely due to NSAIDs induced PUD  -s/p transfusion 4/18  -GI evaluated, held off on EGD bc son wanted to take a conservative approach   - continue to monitor H&H    Arrhythmia/afib  -concern for 2nd or 3rd degree heart block  -no AC for afib   - holding amlodipine -cardiology consulted      HTN (hypertension), benign (4/18/2021) POA:   - Bp acceptable for age   -holding amlodipine   -IV Hydralazine PRN     Hypothyroidism (4/18/2021) POA   -TSH normal.   -cont LT4     Anemia POA: unclear baseline.   -Likely due to blood loss. -monitor and transfuse if h/h drop <7     Arthritis (4/18/2021) POA: Tylenol PRN     Underweight (4/18/2021) POA: likely from advanced age, poor intake vs other.  Diet as tolerated      Total time spent with patient: 28 Minutes **I personally saw and examined the patient during this time period**                 Care Plan discussed with: Care Manager and Nursing Staff    Discussed:  Care Plan    Prophylaxis:  SCD's    Disposition:  assisted living           ___________________________________________________    Attending Physician: Napoleon Garcia DO

## 2021-04-21 NOTE — PROGRESS NOTES
4/21/2021  4:12 PM  RUR:  8%  Risk Level: [x]Low []Moderate []High  Value-based purchasing: [] Yes [x] No  Bundle patient: [] Yes [x] No   Specify:     Transition of care plan:  1. Discharge pending placement arrangements. PT/OT following. 2. TBD - CM spoke with pt's DIL who indicated that they selected [facility] as preference for [facility], and they would like pt to be discharged directly to that facility. CM received p/c from Anaid ([phone]), admissions liaison with [facility], who requested clinicals. Anaid reported that they are working to admit pt tomorrow. Clinicals emailed via [employer] to [email or web address]. Provider's form completed by Dr. Joshua Powell and attached in another email to Russ Vega via [employer]. CM spoke with pt's son and informed him that pt will discharge tomorrow. 3. Outpatient follow-up. 4. Pt's family to transport.

## 2021-04-21 NOTE — PROGRESS NOTES
Problem: Falls - Risk of  Goal: *Absence of Falls  Description: Document [place] Fall Risk and appropriate interventions in the flowsheet. Outcome: Progressing Towards Goal  Note: Fall Risk Interventions:  Mobility Interventions: Bed/chair exit alarm, Patient to call before getting OOB, Utilize walker, cane, or other assistive device, Utilize gait belt for transfers/ambulation, Communicate number of staff needed for ambulation/transfer    Mentation Interventions: Bed/chair exit alarm, Adequate sleep, hydration, pain control, More frequent rounding, Room close to nurse's station    Medication Interventions: Bed/chair exit alarm, Patient to call before getting OOB, Teach patient to arise slowly, Utilize gait belt for transfers/ambulation    Elimination Interventions: Bed/chair exit alarm, Call light in reach, Patient to call for help with toileting needs              Problem: Pressure Injury - Risk of  Goal: *Prevention of pressure injury  Description: Document [place] and appropriate interventions in the flowsheet. Outcome: Progressing Towards Goal  Note: Pressure Injury Interventions:  Sensory Interventions: Assess changes in LOC, Check visual cues for pain, Keep linens dry and wrinkle-free, Turn and reposition approx. every two hours (pillows and wedges if needed)    Moisture Interventions: Check for incontinence Q2 hours and as needed, Internal/External urinary devices, Absorbent underpads    Activity Interventions: Increase time out of bed, Pressure redistribution bed/mattress(bed type)    Mobility Interventions: HOB 30 degrees or less, Pressure redistribution bed/mattress (bed type), Turn and reposition approx.  every two hours(pillow and wedges)    Nutrition Interventions: Document food/fluid/supplement intake, Offer support with meals,snacks and hydration    Friction and Shear Interventions: HOB 30 degrees or less, Minimize layers

## 2021-04-21 NOTE — PROGRESS NOTES
Cardiology Progress Note         NAME:  Janet Singer   :   1930   MRN:   [id number]     Assessment/Plan:   1. Acute GI bleed/anemia: hgb 8  2. 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 May need PPM if becomes symptomatic. 3. 3rd AV block 2.9 seconds: none further on tele   4. Hypothyroid : TSH 1.93   5. Hypertension : resume Norvasc at 2.5 mg today       Will see prn pls call if needed. WIll arrange OP follow up after monitor completed. Pt personally seen and examined. Chart reviewed. Agree with advanced NP's history, exam and  A/P with changes/additons. Blood pressure (!) 152/78, pulse 67, temperature 97.8 °F (36.6 °C), resp. rate 16, height 4' 8\" (1.422 m), weight 80 lb (36.3 kg), SpO2 92 %.    21   ECHO ADULT COMPLETE 2021    Narrative · LV: Estimated LVEF is 60 - 65%. Normal cavity size and systolic function   (ejection fraction normal). Mild concentric hypertrophy. Wall motion:   normal. Mild (grade 1) left ventricular diastolic dysfunction. · AV: Moderate aortic valve sclerosis with reduced excursion. Aortic valve   leaflet calcification present. Mild aortic valve regurgitation is present. · MV: Mitral valve leaflet calcification. Moderate mitral annular   calcification. Mild mitral valve regurgitation is present. · TV: Mild to moderate tricuspid valve regurgitation is present. Signed by: Dina Zuniga MD     Event montior ordered          Nighat Long MD, [facility]    Subjective:   Janet Singer is a 80 y.o. female with GIB. . Cardiology consulted for an incidental finding of 2nd degree type II and 3rd degree AV block on telemetry. Patient is a poor historian d/t dementia. I spoke with her daughter in law. She has lived with her son and DIL since February, after completing rehab for a fall in January. She denies syncope, falls, MAXWELL or chest pain. No hx of cardiac problems.          Cardiac ROS: Patient denies any exertional chest pain, dyspnea, palpitations, syncope, orthopnea, edema or paroxysmal nocturnal dyspnea. Previous Cardiac Eval  21   ECHO ADULT COMPLETE 2021    Narrative · LV: Estimated LVEF is 60 - 65%. Normal cavity size and systolic function   (ejection fraction normal). Mild concentric hypertrophy. Wall motion:   normal. Mild (grade 1) left ventricular diastolic dysfunction. · AV: Moderate aortic valve sclerosis with reduced excursion. Aortic valve   leaflet calcification present. Mild aortic valve regurgitation is present. · MV: Mitral valve leaflet calcification. Moderate mitral annular   calcification. Mild mitral valve regurgitation is present. · TV: Mild to moderate tricuspid valve regurgitation is present. Signed by: Robbert Osler, MD         Review of Systems: No nausea, indigestion, vomiting, pain, cough, sputum. No bleeding. Taking po. Objective:     Visit Vitals  BP (!) 144/66 (BP 1 Location: Left upper arm, BP Patient Position: At rest)   Pulse 62   Temp 97.8 °F (36.6 °C)   Resp 16   Ht 4' 8\" (1.422 m)   Wt 36.3 kg (80 lb)   SpO2 95%   BMI 17.94 kg/m²      O2 Device: None (Room air)    Temp (24hrs), Av.8 °F (36.6 °C), Min:97.6 °F (36.4 °C), Max:98.1 °F (36.7 °C)      No intake/output data recorded.  1901 -  0700  In: 200 [P.O.:200]  Out: 350 [Urine:350]  TELE: SB Type 2 second degree block     General: [id number], no acute distress. Frail , pale. HEENT: Atraumatic. Pink and moist.  Anicteric sclerae. Neck : Supple  Lungs: CTA bilaterally. No wheezing/rhonchi/rales. Heart: Regular rhythm, no murmur, no rubs, no gallops. No JVD. Abdomen: Soft, non-distended, non-tender. + Bowel sounds. Extremities: No edema, no clubbing, no cyanosis. Neurologic: Grossly intact. Alert and oriented X 1  No acute neurological distress. Psych: Good insight. Not anxious nor agitated.         Care Plan discussed with:    Comments   Patient     Family      RN x    Care Manager                    Consultant:  x        Data Review:     No lab exists for component: ITNL   Recent Labs     04/18/21  1126   TROIQ <0.05     Recent Labs     04/21/21  0523 04/20/21  0410 04/19/21  1002 04/19/21  1000 04/18/21  1126 04/18/21  1126    144  --  143  --  139   K 3.2* 3.9  --  4.7  --  4.6   * 111*  --  111*  --  107   CO2 26 23  --  28  --  29   BUN 18 28*  --  30*  --  37*   CREA 0.56 0.59  --  0.62  --  0.91   GLU 87 44*  --  76  --  134*   ALB  --   --   --  2.0*  --  2.1*   WBC 5.5 7.0  --   --   --  12.4*   HGB 8.0* 9.3* 8.6*  --    < > 8.0*   HCT 26.5* 30.7* 28.0*  --    < > 26.3*    291  --   --   --  371    < > = values in this interval not displayed.      Recent Labs     04/18/21  1126   INR 1.1   PTP 11.8*   APTT 23.4       Medications reviewed  Current Facility-Administered Medications   Medication Dose Route Frequency    potassium chloride SR (KLOR-CON 10) tablet 40 mEq  40 mEq Oral NOW    glucose chewable tablet 16 g  4 Tab Oral PRN    dextrose (D50W) injection syrg 12.5-25 g  12.5-25 g IntraVENous PRN    glucagon (GLUCAGEN) injection 1 mg  1 mg IntraMUSCular PRN    dextrose 5 % - 0.45% NaCl infusion  100 mL/hr IntraVENous CONTINUOUS    cefTRIAXone (ROCEPHIN) 1 g in sterile water (preservative free) 10 mL IV syringe  1 g IntraVENous Q24H    pantoprazole (PROTONIX) tablet 40 mg  40 mg Oral ACB&D    sodium chloride (NS) flush 5-40 mL  5-40 mL IntraVENous Q8H    sodium chloride (NS) flush 5-40 mL  5-40 mL IntraVENous PRN    acetaminophen (TYLENOL) tablet 650 mg  650 mg Oral Q6H PRN    Or    acetaminophen (TYLENOL) suppository 650 mg  650 mg Rectal Q6H PRN    polyethylene glycol (MIRALAX) packet 17 g  17 g Oral DAILY PRN    senna (SENOKOT) tablet 8.6 mg  1 Tab Oral DAILY PRN    promethazine (PHENERGAN) tablet 12.5 mg  12.5 mg Oral Q6H PRN    Or    ondansetron (ZOFRAN) injection 4 mg  4 mg IntraVENous Q6H PRN    0.9% sodium chloride infusion 250 mL  250 mL IntraVENous PRN         Rosangela Crafts, NP

## 2021-04-21 NOTE — PROGRESS NOTES
Problem: Falls - Risk of  Goal: *Absence of Falls  Description: Document Blair Mcginnis Fall Risk and appropriate interventions in the flowsheet. Outcome: Progressing Towards Goal  Note: Fall Risk Interventions:  Mobility Interventions: Bed/chair exit alarm, Patient to call before getting OOB, Utilize walker, cane, or other assistive device, Utilize gait belt for transfers/ambulation, Communicate number of staff needed for ambulation/transfer    Mentation Interventions: Bed/chair exit alarm, Adequate sleep, hydration, pain control, More frequent rounding, Room close to nurse's station    Medication Interventions: Bed/chair exit alarm, Patient to call before getting OOB, Teach patient to arise slowly, Utilize gait belt for transfers/ambulation    Elimination Interventions: Bed/chair exit alarm, Call light in reach, Patient to call for help with toileting needs              Problem: Patient Education: Go to Patient Education Activity  Goal: Patient/Family Education  Outcome: Progressing Towards Goal     Problem: Pressure Injury - Risk of  Goal: *Prevention of pressure injury  Description: Document South Scale and appropriate interventions in the flowsheet. Outcome: Progressing Towards Goal  Note: Pressure Injury Interventions:  Sensory Interventions: Assess changes in LOC, Check visual cues for pain, Keep linens dry and wrinkle-free, Turn and reposition approx. every two hours (pillows and wedges if needed)    Moisture Interventions: Check for incontinence Q2 hours and as needed, Internal/External urinary devices, Absorbent underpads    Activity Interventions: Increase time out of bed, Pressure redistribution bed/mattress(bed type)    Mobility Interventions: HOB 30 degrees or less, Pressure redistribution bed/mattress (bed type), Turn and reposition approx.  every two hours(pillow and wedges)    Nutrition Interventions: Document food/fluid/supplement intake, Offer support with meals,snacks and hydration    Friction and Shear Interventions: HOB 30 degrees or less, Minimize layers                Problem: Patient Education: Go to Patient Education Activity  Goal: Patient/Family Education  Outcome: Progressing Towards Goal     Problem: Patient Education: Go to Patient Education Activity  Goal: Patient/Family Education  Outcome: Progressing Towards Goal     Problem: Patient Education: Go to Patient Education Activity  Goal: Patient/Family Education  Outcome: Progressing Towards Goal

## 2021-04-21 NOTE — PROGRESS NOTES
Gastrointestinal Progress Note    4/21/2021    Admit Date: 4/18/2021    Subjective:     Comfortable, alert; no complaints; no bowel movements    Current Facility-Administered Medications   Medication Dose Route Frequency    [START ON 4/22/2021] amLODIPine (NORVASC) tablet 5 mg  5 mg Oral DAILY    [START ON 4/22/2021] levothyroxine (SYNTHROID) tablet 50 mcg  50 mcg Oral ACB    glucose chewable tablet 16 g  4 Tab Oral PRN    dextrose (D50W) injection syrg 12.5-25 g  12.5-25 g IntraVENous PRN    glucagon (GLUCAGEN) injection 1 mg  1 mg IntraMUSCular PRN    dextrose 5 % - 0.45% NaCl infusion  100 mL/hr IntraVENous CONTINUOUS    cefTRIAXone (ROCEPHIN) 1 g in sterile water (preservative free) 10 mL IV syringe  1 g IntraVENous Q24H    pantoprazole (PROTONIX) tablet 40 mg  40 mg Oral ACB&D    sodium chloride (NS) flush 5-40 mL  5-40 mL IntraVENous Q8H    sodium chloride (NS) flush 5-40 mL  5-40 mL IntraVENous PRN    acetaminophen (TYLENOL) tablet 650 mg  650 mg Oral Q6H PRN    Or    acetaminophen (TYLENOL) suppository 650 mg  650 mg Rectal Q6H PRN    polyethylene glycol (MIRALAX) packet 17 g  17 g Oral DAILY PRN    senna (SENOKOT) tablet 8.6 mg  1 Tab Oral DAILY PRN    promethazine (PHENERGAN) tablet 12.5 mg  12.5 mg Oral Q6H PRN    Or    ondansetron (ZOFRAN) injection 4 mg  4 mg IntraVENous Q6H PRN    0.9% sodium chloride infusion 250 mL  250 mL IntraVENous PRN        Objective:     Blood pressure 109/71, pulse 75, temperature 98.7 °F (37.1 °C), resp. rate 16, height 4' 8\" (1.422 m), weight 36.3 kg (80 lb), SpO2 97 %. 04/21 0701 - 04/21 1900  In: 40 [P.O.:40]  Out: 350 [Urine:350]    04/19 1901 - 04/21 0700  In: 2415 [P.O.:200;  I.V.:2215]  Out: 950 [Urine:950]    EXAM:  GENERAL: alert, cooperative, no distress, HEART: regular rate and rhythm, LUNGS: chest clear, no wheezing, rales, normal symmetric air entry, ABDOMEN:  Bowel sounds are normal, liver is not enlarged, spleen is not enlarged and EXTREMITY: no edema      Data Review    Recent Results (from the past 24 hour(s))   GLUCOSE, POC    Collection Time: 04/20/21  5:12 PM   Result Value Ref Range    Glucose (POC) 107 (H) 65 - 100 mg/dL    Performed by Dixie Delgado    GLUCOSE, POC    Collection Time: 04/20/21  9:28 PM   Result Value Ref Range    Glucose (POC) 104 (H) 65 - 100 mg/dL    Performed by Von Kaur (PCT)    CBC WITH AUTOMATED DIFF    Collection Time: 04/21/21  5:23 AM   Result Value Ref Range    WBC 5.5 3.6 - 11.0 K/uL    RBC 2.85 (L) 3.80 - 5.20 M/uL    HGB 8.0 (L) 11.5 - 16.0 g/dL    HCT 26.5 (L) 35.0 - 47.0 %    MCV 93.0 80.0 - 99.0 FL    MCH 28.1 26.0 - 34.0 PG    MCHC 30.2 30.0 - 36.5 g/dL    RDW 14.7 (H) 11.5 - 14.5 %    PLATELET 695 807 - 107 K/uL    MPV 8.6 (L) 8.9 - 12.9 FL    NRBC 0.0 0  WBC    ABSOLUTE NRBC 0.00 0.00 - 0.01 K/uL    NEUTROPHILS 56 32 - 75 %    LYMPHOCYTES 30 12 - 49 %    MONOCYTES 9 5 - 13 %    EOSINOPHILS 4 0 - 7 %    BASOPHILS 1 0 - 1 %    IMMATURE GRANULOCYTES 0 0.0 - 0.5 %    ABS. NEUTROPHILS 3.1 1.8 - 8.0 K/UL    ABS. LYMPHOCYTES 1.7 0.8 - 3.5 K/UL    ABS. MONOCYTES 0.5 0.0 - 1.0 K/UL    ABS. EOSINOPHILS 0.2 0.0 - 0.4 K/UL    ABS. BASOPHILS 0.0 0.0 - 0.1 K/UL    ABS. IMM.  GRANS. 0.0 0.00 - 0.04 K/UL    DF AUTOMATED     METABOLIC PANEL, BASIC    Collection Time: 04/21/21  5:23 AM   Result Value Ref Range    Sodium 141 136 - 145 mmol/L    Potassium 3.2 (L) 3.5 - 5.1 mmol/L    Chloride 110 (H) 97 - 108 mmol/L    CO2 26 21 - 32 mmol/L    Anion gap 5 5 - 15 mmol/L    Glucose 87 65 - 100 mg/dL    BUN 18 6 - 20 MG/DL    Creatinine 0.56 0.55 - 1.02 MG/DL    BUN/Creatinine ratio 32 (H) 12 - 20      GFR est AA >60 >60 ml/min/1.73m2    GFR est non-AA >60 >60 ml/min/1.73m2    Calcium 7.5 (L) 8.5 - 10.1 MG/DL   GLUCOSE, POC    Collection Time: 04/21/21 12:30 PM   Result Value Ref Range    Glucose (POC) 127 (H) 65 - 100 mg/dL    Performed by Siena Farrar    GLUCOSE, POC    Collection Time: 04/21/21  4:06 PM   Result Value Ref Range    Glucose (POC) 113 (H) 65 - 100 mg/dL    Performed by Tayler King        Assessment:     Principal Problem:    Acute GI bleeding (4/18/2021)    Active Problems:    Coffee ground emesis (4/18/2021)      HTN (hypertension), benign (4/18/2021)      Hypothyroidism (4/18/2021)      Arthritis (4/18/2021)      Underweight (4/18/2021)        Plan:     1.   If hb stable tomorrow, would have no objection to discharge

## 2021-04-22 ENCOUNTER — APPOINTMENT (OUTPATIENT)
Dept: GENERAL RADIOLOGY | Age: 86
DRG: 377 | End: 2021-04-22
Attending: INTERNAL MEDICINE
Payer: MEDICARE

## 2021-04-22 LAB
ANION GAP SERPL CALC-SCNC: 6 MMOL/L (ref 5–15)
B PERT DNA SPEC QL NAA+PROBE: NOT DETECTED
BASOPHILS # BLD: 0 K/UL (ref 0–0.1)
BASOPHILS NFR BLD: 1 % (ref 0–1)
BORDETELLA PARAPERTUSSIS PCR, BORPAR: NOT DETECTED
BUN SERPL-MCNC: 16 MG/DL (ref 6–20)
BUN/CREAT SERPL: 29 (ref 12–20)
C PNEUM DNA SPEC QL NAA+PROBE: NOT DETECTED
CALCIUM SERPL-MCNC: 7.4 MG/DL (ref 8.5–10.1)
CHLORIDE SERPL-SCNC: 110 MMOL/L (ref 97–108)
CO2 SERPL-SCNC: 22 MMOL/L (ref 21–32)
CREAT SERPL-MCNC: 0.56 MG/DL (ref 0.55–1.02)
DIFFERENTIAL METHOD BLD: ABNORMAL
EOSINOPHIL # BLD: 0.3 K/UL (ref 0–0.4)
EOSINOPHIL NFR BLD: 4 % (ref 0–7)
ERYTHROCYTE [DISTWIDTH] IN BLOOD BY AUTOMATED COUNT: 14.8 % (ref 11.5–14.5)
FLUAV H1 2009 PAND RNA SPEC QL NAA+PROBE: NOT DETECTED
FLUAV H1 RNA SPEC QL NAA+PROBE: NOT DETECTED
FLUAV H3 RNA SPEC QL NAA+PROBE: NOT DETECTED
FLUAV SUBTYP SPEC NAA+PROBE: NOT DETECTED
FLUBV RNA SPEC QL NAA+PROBE: NOT DETECTED
GLUCOSE SERPL-MCNC: 74 MG/DL (ref 65–100)
HADV DNA SPEC QL NAA+PROBE: NOT DETECTED
HCOV 229E RNA SPEC QL NAA+PROBE: NOT DETECTED
HCOV HKU1 RNA SPEC QL NAA+PROBE: NOT DETECTED
HCOV NL63 RNA SPEC QL NAA+PROBE: NOT DETECTED
HCOV OC43 RNA SPEC QL NAA+PROBE: NOT DETECTED
HCT VFR BLD AUTO: 29.9 % (ref 35–47)
HGB BLD-MCNC: 9 G/DL (ref 11.5–16)
HMPV RNA SPEC QL NAA+PROBE: NOT DETECTED
HPIV1 RNA SPEC QL NAA+PROBE: NOT DETECTED
HPIV2 RNA SPEC QL NAA+PROBE: NOT DETECTED
HPIV3 RNA SPEC QL NAA+PROBE: NOT DETECTED
HPIV4 RNA SPEC QL NAA+PROBE: NOT DETECTED
IMM GRANULOCYTES # BLD AUTO: 0 K/UL (ref 0–0.04)
IMM GRANULOCYTES NFR BLD AUTO: 0 % (ref 0–0.5)
LYMPHOCYTES # BLD: 1.8 K/UL (ref 0.8–3.5)
LYMPHOCYTES NFR BLD: 31 % (ref 12–49)
M PNEUMO DNA SPEC QL NAA+PROBE: NOT DETECTED
MCH RBC QN AUTO: 28 PG (ref 26–34)
MCHC RBC AUTO-ENTMCNC: 30.1 G/DL (ref 30–36.5)
MCV RBC AUTO: 93.1 FL (ref 80–99)
MONOCYTES # BLD: 0.6 K/UL (ref 0–1)
MONOCYTES NFR BLD: 10 % (ref 5–13)
NEUTS SEG # BLD: 3.1 K/UL (ref 1.8–8)
NEUTS SEG NFR BLD: 54 % (ref 32–75)
NRBC # BLD: 0 K/UL (ref 0–0.01)
NRBC BLD-RTO: 0 PER 100 WBC
PLATELET # BLD AUTO: 272 K/UL (ref 150–400)
PMV BLD AUTO: 8.6 FL (ref 8.9–12.9)
POTASSIUM SERPL-SCNC: 4.6 MMOL/L (ref 3.5–5.1)
RBC # BLD AUTO: 3.21 M/UL (ref 3.8–5.2)
RSV RNA SPEC QL NAA+PROBE: NOT DETECTED
RV+EV RNA SPEC QL NAA+PROBE: DETECTED
SARS-COV-2 PCR, COVPCR: NOT DETECTED
SODIUM SERPL-SCNC: 138 MMOL/L (ref 136–145)
WBC # BLD AUTO: 5.8 K/UL (ref 3.6–11)

## 2021-04-22 PROCEDURE — 80048 BASIC METABOLIC PNL TOTAL CA: CPT

## 2021-04-22 PROCEDURE — 71045 X-RAY EXAM CHEST 1 VIEW: CPT

## 2021-04-22 PROCEDURE — 97535 SELF CARE MNGMENT TRAINING: CPT | Performed by: OCCUPATIONAL THERAPIST

## 2021-04-22 PROCEDURE — 74011250637 HC RX REV CODE- 250/637: Performed by: INTERNAL MEDICINE

## 2021-04-22 PROCEDURE — 36415 COLL VENOUS BLD VENIPUNCTURE: CPT

## 2021-04-22 PROCEDURE — 0202U NFCT DS 22 TRGT SARS-COV-2: CPT

## 2021-04-22 PROCEDURE — 74011000250 HC RX REV CODE- 250: Performed by: FAMILY MEDICINE

## 2021-04-22 PROCEDURE — 65660000000 HC RM CCU STEPDOWN

## 2021-04-22 PROCEDURE — 85025 COMPLETE CBC W/AUTO DIFF WBC: CPT

## 2021-04-22 PROCEDURE — 77030038269 HC DRN EXT URIN PURWCK BARD -A

## 2021-04-22 PROCEDURE — 74011250636 HC RX REV CODE- 250/636: Performed by: FAMILY MEDICINE

## 2021-04-22 RX ORDER — AMLODIPINE BESYLATE 2.5 MG/1
2.5 TABLET ORAL DAILY
Status: DISCONTINUED | OUTPATIENT
Start: 2021-04-22 | End: 2021-04-23

## 2021-04-22 RX ADMIN — PANTOPRAZOLE SODIUM 40 MG: 40 TABLET, DELAYED RELEASE ORAL at 06:16

## 2021-04-22 RX ADMIN — CEFTRIAXONE 1 G: 1 INJECTION, POWDER, FOR SOLUTION INTRAMUSCULAR; INTRAVENOUS at 08:30

## 2021-04-22 RX ADMIN — PANTOPRAZOLE SODIUM 40 MG: 40 TABLET, DELAYED RELEASE ORAL at 15:45

## 2021-04-22 RX ADMIN — Medication 10 ML: at 06:20

## 2021-04-22 RX ADMIN — Medication 10 ML: at 15:45

## 2021-04-22 RX ADMIN — AMLODIPINE BESYLATE 2.5 MG: 2.5 TABLET ORAL at 17:12

## 2021-04-22 RX ADMIN — Medication 10 ML: at 21:27

## 2021-04-22 RX ADMIN — LEVOTHYROXINE SODIUM 50 MCG: 0.05 TABLET ORAL at 06:16

## 2021-04-22 NOTE — PROGRESS NOTES
Problem: Falls - Risk of  Goal: *Absence of Falls  Description: Document Michelle Bowman Fall Risk and appropriate interventions in the flowsheet. Outcome: Progressing Towards Goal  Note: Fall Risk Interventions:  Mobility Interventions: Communicate number of staff needed for ambulation/transfer, Patient to call before getting OOB    Mentation Interventions: Door open when patient unattended, Bed/chair exit alarm, More frequent rounding, Reorient patient, Room close to nurse's station    Medication Interventions: Patient to call before getting OOB    Elimination Interventions: Bed/chair exit alarm, Call light in reach, Patient to call for help with toileting needs, Toileting schedule/hourly rounds              Problem: Pressure Injury - Risk of  Goal: *Prevention of pressure injury  Description: Document South Scale and appropriate interventions in the flowsheet. Outcome: Progressing Towards Goal  Note: Pressure Injury Interventions:  Sensory Interventions: Assess changes in LOC, Float heels, Keep linens dry and wrinkle-free, Minimize linen layers, Monitor skin under medical devices, Pressure redistribution bed/mattress (bed type), Turn and reposition approx. every two hours (pillows and wedges if needed)    Moisture Interventions: Absorbent underpads, Internal/External urinary devices, Maintain skin hydration (lotion/cream), Minimize layers    Activity Interventions: Increase time out of bed, Pressure redistribution bed/mattress(bed type)    Mobility Interventions: Float heels, HOB 30 degrees or less, Pressure redistribution bed/mattress (bed type), Turn and reposition approx.  every two hours(pillow and wedges)    Nutrition Interventions: Document food/fluid/supplement intake, Offer support with meals,snacks and hydration    Friction and Shear Interventions: HOB 30 degrees or less

## 2021-04-22 NOTE — PROGRESS NOTES
Spiritual Care Assessment/Progress Note  [employer]      NAME: Isla Brunner      MRN: [id number]  AGE: 80 y.o.  SEX: female  Anglican Affiliation: Sabianism   Language: English     4/22/2021     Total Time (in minutes): 5     Spiritual Assessment begun in [facility] POST SURG ORT 1 through conversation with:         [x]Patient        [] Family    [] Friend(s)        Reason for Consult: [facility]     Spiritual beliefs: (Please include comment if needed)     [x] Identifies with a morenita tradition: Sabianism        [] Supported by a morenita community:            [] Claims no spiritual orientation:           [] Seeking spiritual identity:                [] Adheres to an individual form of spirituality:           [] Not able to assess:                           Identified resources for coping:      [x] Prayer                               [x] Music                  [] Guided Imagery     [x] Family/friends                 [] Pet visits     [] Devotional reading                         [] Unknown     [] Other:                                              Interventions offered during this visit: (See comments for more details)    Patient Interventions: Affirmation of morenita, Communion (Sabianism), Initial/Spiritual assessment, patient floor, Prayer (actual), Prayer (assurance of)           Plan of Care:     [x] Support spiritual and/or cultural needs    [] Support AMD and/or advance care planning process      [] Support grieving process   [] Coordinate Rites and/or Rituals    [] Coordination with community clergy   [] No spiritual needs identified at this time   [] Detailed Plan of Care below (See Comments)  [] Make referral to Music Therapy  [] Make referral to Pet Therapy     [] Make referral to Addiction services  [] Make referral to [facility]  [] Make referral to Spiritual Care Partner  [] No future visits requested        [] Follow up upon further referrals     Comments: Mrs. Howard Yanets was sitting up in her chair. She had no family member with her. She declined communion today. She said she doesn't know why she is here and couldn't tell me she was in the hospital.  She did say she lives with her son and the children are not around a lot. Active listening offered and unable to understand most of what Mrs. Kasandra Villatoro talked about. Prayer offered.     SULEMA Marshall, RN, ACSW, LCSW   Page:  [phone]

## 2021-04-22 NOTE — PROGRESS NOTES
4/22/2021  9:39 AM  RUR:  9%  Risk Level: [x]Low []Moderate []High  Value-based purchasing: [] Yes [x] No  Bundle patient: [] Yes [x] No   Specify:     Transition of care plan:  1. Discharge pending placement. 2. [facility]PC - Pt's family is making arrangements for pt to be admitted to [facility] [place]; however, pt's son is out of town to make this move. Family has requested for pt to stay until tomorrow, and attending was notified of their request.  3. Outpatient follow-up. 4. Pt's family to transport.

## 2021-04-22 NOTE — PROGRESS NOTES
[facility]  [address]  [phone]      Medical Progress Note      NAME: Sveta Longoria   :  1930  MRM:  [id number]    Date/Time of service: 2021  5:06 PM       Subjective:     Chief Complaint:  Patient was personally seen and examined by me during this time period. Chart reviewed. Unable to obtain reliable ROS due to dementia. Objective:       Vitals:       Last 24hrs VS reviewed since prior progress note.  Most recent are:    Visit Vitals  BP (!) 144/82 (BP 1 Location: Right upper arm, BP Patient Position: Sitting)   Pulse 94   Temp 98.3 °F (36.8 °C)   Resp 17   Ht 4' 8\" (1.422 m)   Wt 36.3 kg (80 lb)   SpO2 98%   BMI 17.94 kg/m²     SpO2 Readings from Last 6 Encounters:   21 98%            Intake/Output Summary (Last 24 hours) at 2021 1706  Last data filed at 2021 1418  Gross per 24 hour   Intake 610 ml   Output 400 ml   Net 210 ml        Exam:     Physical Exam:    Gen:  frail, in no acute distress  HEENT:  Pink conjunctivae, PERRL, hearing intact to voice  Resp:  No accessory muscle use, clear breath sounds without wheezes rales or rhonchi  Card:  RRR, No murmurs, normal S1, S2, b/l peripheral edema  Abd:  Soft, non-tender, non-distended, normoactive bowel sounds are present  Musc:  No cyanosis or clubbing, lower extremity tenderness to palpation  Skin:  skin turgor is good  Neuro:  Cranial nerves 3-12 are grossly intact, diffuse weaknesses but non focal  Psych:  Poor insight      Medications Reviewed: (see below)    Lab Data Reviewed: (see below)    ______________________________________________________________________    Medications:     Current Facility-Administered Medications   Medication Dose Route Frequency    amLODIPine (NORVASC) tablet 2.5 mg  2.5 mg Oral DAILY    levothyroxine (SYNTHROID) tablet 50 mcg  50 mcg Oral ACB    glucose chewable tablet 16 g  4 Tab Oral PRN    dextrose (D50W) injection syrg 12.5-25 g  12.5-25 g IntraVENous PRN    glucagon (GLUCAGEN) injection 1 mg  1 mg IntraMUSCular PRN    pantoprazole (PROTONIX) tablet 40 mg  40 mg Oral ACB&D    sodium chloride (NS) flush 5-40 mL  5-40 mL IntraVENous Q8H    sodium chloride (NS) flush 5-40 mL  5-40 mL IntraVENous PRN    acetaminophen (TYLENOL) tablet 650 mg  650 mg Oral Q6H PRN    Or    acetaminophen (TYLENOL) suppository 650 mg  650 mg Rectal Q6H PRN    polyethylene glycol (MIRALAX) packet 17 g  17 g Oral DAILY PRN    senna (SENOKOT) tablet 8.6 mg  1 Tab Oral DAILY PRN    promethazine (PHENERGAN) tablet 12.5 mg  12.5 mg Oral Q6H PRN    Or    ondansetron (ZOFRAN) injection 4 mg  4 mg IntraVENous Q6H PRN    0.9% sodium chloride infusion 250 mL  250 mL IntraVENous PRN          Lab Review:     Recent Labs     04/22/21  0426 04/21/21  0523 04/20/21  0410   WBC 5.8 5.5 7.0   HGB 9.0* 8.0* 9.3*   HCT 29.9* 26.5* 30.7*    277 291     Recent Labs     04/22/21  0426 04/21/21  0523 04/20/21  0410    141 144   K 4.6 3.2* 3.9   * 110* 111*   CO2 22 26 23   GLU 74 87 44*   BUN 16 18 28*   CREA 0.56 0.56 0.59   CA 7.4* 7.5* 8.4*     Lab Results   Component Value Date/Time    Glucose (POC) 113 (H) 04/21/2021 04:06 PM    Glucose (POC) 127 (H) 04/21/2021 12:30 PM    Glucose (POC) 104 (H) 04/20/2021 09:28 PM    Glucose (POC) 107 (H) 04/20/2021 05:12 PM    Glucose (POC) 186 (H) 04/20/2021 06:22 AM          Assessment / Plan:     Acute GI bleeding (4/18/2021) / Coffee ground emesis (4/18/2021) POA:   -likely due to NSAIDs induced PUD  -s/p transfusion 4/18  -GI evaluated, held off on EGD bc son wanted to take a conservative approach   - c/w PPI   - continue to monitor H&H    Febrile  -Unknown etiology  -Patient unable to further review systems  -Obtain chest x-ray, UA, respiratory panel and venous Dopplers    Arrhythmia/afib  -concern for 2nd or 3rd degree heart block  -no AC for afib   - holding amlodipine   -cardiology consulted    HTN (hypertension), benign (4/18/2021) POA:   - Bp acceptable for age   -added back low dose amlodipine, discussed with cardio   -IV Hydralazine PRN     Hypothyroidism (4/18/2021) POA   -TSH normal.   -cont LT4     Anemia POA: unclear baseline.   -Likely due to blood loss. -monitor and transfuse if h/h drop <7     Arthritis (4/18/2021) POA: Tylenol PRN     Underweight (4/18/2021) POA: likely from advanced age, poor intake vs other.  Diet as tolerated      Total time spent with patient: 28 Minutes **I personally saw and examined the patient during this time period**                 Care Plan discussed with: Care Manager and Nursing Staff    Discussed:  Care Plan    Prophylaxis:  SCD's    Disposition:  assisted living           ___________________________________________________    Attending Physician: Aleksandar Diaz DO

## 2021-04-22 NOTE — PROGRESS NOTES
Physician Progress Note      PATIENT:               Tasha Cheema  CSN #:                  [id number]  :                       1930  ADMIT DATE:       2021 10:43 AM  DISCH DATE:  RESPONDING  PROVIDER #:        Melony Shaw MD LISSA [facility] MD          QUERY TEXT:    Pt admitted with GI bleeding and has anemia documented. If possible, please document in progress notes and discharge summary further specificity regarding the acuity and type of anemia:    The medical record reflects the following:  Risk Factors: 80 yr. old female admitted with GI bleeding. Clinical Indicators: Tachycardia, HGB 6.1. Presented to ED due to coffee ground emesis. Treatment: GI consult, Lab work, blood transfusion  Options provided:  -- Anemia due to acute blood loss  -- Anemia due to chronic blood loss  -- Anemia due to acute on chronic blood loss  -- Anemia due to iron deficiency  -- Anemia due to chronic disease  -- Dilutional anemia  -- Other - I will add my own diagnosis  -- Disagree - Not applicable / Not valid  -- Disagree - Clinically unable to determine / Unknown  -- Refer to Clinical Documentation Reviewer    PROVIDER RESPONSE TEXT:    This patient has acute on chronic blood loss anemia.     Query created by: Carl Patel on 2021 12:16 PM      Electronically signed by:  Melony ALCARAZ [facility] MD 2021 3:35 PM

## 2021-04-22 NOTE — PROGRESS NOTES
Problem: Self Care Deficits Care Plan (Adult)  Goal: *Acute Goals and Plan of Care (Insert Text)  Description: FUNCTIONAL STATUS PRIOR TO ADMISSION: Pt is a questionable historian. Confused during questioning but reports today that prior to admission she was living with her son and daughter in law and used a rollator for mobility. Denies recent falls and states that she has not left the house and requires assistance for ADLs. HOME SUPPORT PRIOR TO ADMISSION: The patient lived with son and daughter in law and required assistance for ADLs. Unclear assistance level needed for transfers and gait. Occupational Therapy Goals  Initiated 4/19/2021  1. Patient will perform grooming with supervision/set-up with minimal verbal cues within 7 day(s). 2.  Patient will perform upper body dressing with minimal assistance/contact guard assist within 7 day(s). 3.  Patient will perform lower body dressing with moderate assistance within 7 day(s). 4.  Patient will perform all aspects of toileting with minimal assistance/contact guard assist within 7 day(s). 5.  Patient will participate in upper extremity therapeutic exercise/activities with minimal assistance/contact guard assist for 5 minutes within 7 day(s). Outcome: Progressing Towards Goal    OCCUPATIONAL THERAPY TREATMENT  Patient: Lisset Calloway (43 y.o. female)  Date: 4/22/2021  Diagnosis: Coffee ground emesis [K92.0] Acute GI bleeding       Precautions:  Fall  Chart, occupational therapy assessment, plan of care, and goals were reviewed. ASSESSMENT  Patient continues with skilled OT services and is slowly progressing towards goals. Pt received soiled with urine and oriented x1 this date, however pleasant and agreeable to therapy. Pt completed supine to sit with Max A and sit to stand with Min A using RW with increased time and verbal cues for sequencing. Pt required total A for bryant area hygiene.  Pt noted with intermittent pain in LLE, saying \"that's the one I broke,\" however subsided in chair. Pt required Min A for bed to chair using RW and noted to \"scissor\" walk, requiring verbal cues to move feet apart. Pt is [place] and benefited from short, 1 step directions. Continue to recommend SNF vs HHOT with 24/7 supervision and assist with ADLs and mobility. Current Level of Function Impacting Discharge (ADLs): Total A toilet hygiene    Other factors to consider for discharge: TBD         PLAN :  Patient continues to benefit from skilled intervention to address the above impairments. Continue treatment per established plan of care to address goals. Recommend with staff: Up to chair for meals and bathroom for toileting    Recommend next OT session: Toileting, LB ADLs    Recommendation for discharge: (in order for the patient to meet his/her long term goals)  Therapy up to 5 days/week in SNF setting or an intensive home health therapy program with 24/7 supervision and assist with ADLs and mobility    This discharge recommendation:  Has been made in collaboration with the attending provider and/or case management    IF patient discharges home will need the following DME: TBD       SUBJECTIVE:   Patient stated I thought there was supposed to be a party today.     OBJECTIVE DATA SUMMARY:   Cognitive/Behavioral Status:  Neurologic State: Alert  Orientation Level: Oriented to person;Disoriented to place; Disoriented to situation;Disoriented to time  Cognition: Follows commands  Perception: Appears intact  Perseveration: Perseverates during conversation(\"Was there supposed to be a party today? \")  Safety/Judgement: Decreased insight into deficits    Functional Mobility and Transfers for ADLs:  Bed Mobility:  Supine to Sit: Maximum assistance; Additional time;Assist x1  Scooting: Minimum assistance; Additional time    Transfers:  Sit to Stand: Minimum assistance; Additional time     Bed to Chair: Assist x1;Additional time;Minimum assistance    Balance:  Sitting: Impaired  Sitting - Static: Good (unsupported)  Sitting - Dynamic: Fair (occasional)  Standing: Impaired; With support  Standing - Static: Fair  Standing - Dynamic : Fair    ADL Intervention:  Upper Body Bathing  Bathing Assistance: Supervision;Set-up  Position Performed: Seated edge of bed  Cues: Verbal cues provided(for sequencing and thoroughness)    Lower Body Bathing  Perineal  : Total assistance (dependent)  Position Performed: Standing    Upper [place] [address] Gown: Minimum  assistance    Lower Body Dressing Assistance  Socks: Total assistance (dependent)    Toileting  Bladder Hygiene: Total assistance (dependent)  Clothing Management: Moderate assistance  Cues: Verbal cues provided    Cognitive Retraining  Safety/Judgement: Decreased insight into deficits    Pain:  Intermittent LLE pain, did not quantify, subsided in chair    Activity Tolerance:   Fair    After treatment patient left in no apparent distress:   Sitting in chair, Call bell within reach, and Bed / chair alarm activated    COMMUNICATION/COLLABORATION:   The patients plan of care was discussed with: Registered nurseDominick Hernandez  Time Calculation: 24 mins     Regarding student involvement in patient care:  A student participated in this treatment session. Per CMS Medicare statements and AOTA guidelines I certify that the following was true:  1. I was present and directly observed the entire session. 2. I made all skilled judgments and clinical decisions regarding care. 3. I am the practitioner responsible for assessment, treatment, and documentation.

## 2021-04-22 NOTE — PROGRESS NOTES
Comprehensive Nutrition Assessment      Type and Reason for Visit: Reassess    Nutrition Recommendations/Plan:   1. Continue Regular diet as tolerated- Consider GI lite, or Low fiber if appropriate. 2. Added Ensure Compact TID    3. Monitor Plan of care. Daily weights. PO intake       Nutrition Assessment:       Pt admitted for Coffee ground emesis [K92.0]. Pt  has a past medical history of Arthritis, Hypertension, and Hypothyroidism. Reassessment- diet started 4/20. PO intake documented as 1-25%. No wt hx in chart. Pt is not a good historian, could not obtain detailed diet/wt hx from pt. Pt very hard to understand anything pt states. Pt is now on Regular diet. GI notes no bleeding and \"eating normally,\" although PO does not appear to be meeting estimated needs. No reports of n/v currently. No abdominal pain. Last BM 4/17. NKFA. Pt with recent NSAID use for arthritis per reports. No reported visible blood loss. No reported chew/swallow difficulties. Pt was on regular texture PTA. Added ONS TID Ensure compact for concentrated kcal/protein if pt agreeable. Monitor trial of Compact TID.        Wt Readings from Last 10 Encounters:   04/20/21 36.3 kg (80 lb)     Malnutrition Assessment:  Malnutrition Status:  Severe malnutrition    Context:  Acute illness     Findings of the 6 clinical characteristics of malnutrition:   Energy Intake:  7 - 50% or less of est energy requirements for 5 or more days  Weight Loss:  No significant weight loss     Body Fat Loss:  1 - Mild body fat loss,     Muscle Mass Loss:  7 - Moderate muscle mass loss,    Fluid Accumulation:  No significant fluid accumulation,     Strength:  Not performed             Estimated Daily Nutrient Needs:  Energy (kcal):  1265 (BMR x 1.2) +500  Protein (g):  44       Fluid (ml/day):  1250    Documented meal intake:   Patient Vitals for the past 168 hrs:   % Diet Eaten   04/22/21 1418 0   04/22/21 0923 1 - 25%   04/21/21 1241 1 - 25%   04/21/21 0912 1 - 25%   04/20/21 1212 1 - 25%   04/19/21 1230 0   04/19/21 0919 0   04/18/21 1851 1 - 25%       Documented Supplement intake:  Patient Vitals for the past 168 hrs:   Supplement intake %   04/19/21 1230 0   04/19/21 0919 0       Nutrition Related Findings:      last BM 4/17- loose, 1+ edema BLE. Nutritionally Significant Medications:   Protonix. PRN: miralax, Zofran, senna. Wounds:    None       Current Nutrition Therapies:  DIET REGULAR  DIET NUTRITIONAL SUPPLEMENTS All Meals;  Ensure Compact    Anthropometric Measures:  · Height:  4' 8\" (142.2 cm)  · Current Body Wt:  36.3 kg (80 lb 0.4 oz)   · Admission Body Wt:   36.3kg    · Usual Body Wt:   ?     · Ideal Body Wt:  80 lbs:  100 %   · BMI Category:  Underweight (BMI less than 22) age over 72       Nutrition Diagnosis:   · Inadequate protein-energy intake related to altered GI function as evidenced by NPO or clear liquid status due to medical condition(GI bleed)      Nutrition Interventions:   Food and/or Nutrient Delivery: Continue current diet, Start oral nutrition supplement  Nutrition Education and Counseling: No recommendations at this time  Coordination of Nutrition Care: Continue to monitor while inpatient, Interdisciplinary rounds    Goals:  Pt will consume >25%of meals and 50% ONS within 3-5 days       Nutrition Monitoring and Evaluation:   Behavioral-Environmental Outcomes: Beliefs and attitudes  Food/Nutrient Intake Outcomes: Food and nutrient intake, Supplement intake  Physical Signs/Symptoms Outcomes: Biochemical data, Weight, Hemodynamic status    Discharge Planning:    Continue oral nutrition supplement, Continue current diet     Electronically signed by Jacob Beatty RD     Contact: [phone]

## 2021-04-22 NOTE — PROGRESS NOTES
Gastrointestinal Progress Note    4/22/2021    Admit Date: 4/18/2021    Subjective:     Nursing staff indicate absence of identifiable intestinal problems. She appears to be eating normally; no vomiting and no bleeding. Current Facility-Administered Medications   Medication Dose Route Frequency    levothyroxine (SYNTHROID) tablet 50 mcg  50 mcg Oral ACB    glucose chewable tablet 16 g  4 Tab Oral PRN    dextrose (D50W) injection syrg 12.5-25 g  12.5-25 g IntraVENous PRN    glucagon (GLUCAGEN) injection 1 mg  1 mg IntraMUSCular PRN    pantoprazole (PROTONIX) tablet 40 mg  40 mg Oral ACB&D    sodium chloride (NS) flush 5-40 mL  5-40 mL IntraVENous Q8H    sodium chloride (NS) flush 5-40 mL  5-40 mL IntraVENous PRN    acetaminophen (TYLENOL) tablet 650 mg  650 mg Oral Q6H PRN    Or    acetaminophen (TYLENOL) suppository 650 mg  650 mg Rectal Q6H PRN    polyethylene glycol (MIRALAX) packet 17 g  17 g Oral DAILY PRN    senna (SENOKOT) tablet 8.6 mg  1 Tab Oral DAILY PRN    promethazine (PHENERGAN) tablet 12.5 mg  12.5 mg Oral Q6H PRN    Or    ondansetron (ZOFRAN) injection 4 mg  4 mg IntraVENous Q6H PRN    0.9% sodium chloride infusion 250 mL  250 mL IntraVENous PRN        Objective:     Blood pressure (!) 145/78, pulse 61, temperature 97.4 °F (36.3 °C), resp. rate 14, height 4' 8\" (1.422 m), weight 36.3 kg (80 lb), SpO2 96 %. No intake/output data recorded.     04/20 1901 - 04/22 0700  In: 2655 [P.O.:440; I.V.:2215]  Out: 1250 [Urine:1250]    EXAM:  GENERAL: alert, no distress, HEART: regular rate and rhythm, LUNGS: chest clear, no wheezing, rales, normal symmetric air entry, ABDOMEN:  Bowel sounds are normal, liver is not enlarged, spleen is not enlarged and EXTREMITY:   Data Review    Recent Results (from the past 24 hour(s))   GLUCOSE, POC    Collection Time: 04/21/21 12:30 PM   Result Value Ref Range    Glucose (POC) 127 (H) 65 - 100 mg/dL    Performed by Saralee Douse    GLUCOSE, POC    Collection Time: 04/21/21  4:06 PM   Result Value Ref Range    Glucose (POC) 113 (H) 65 - 100 mg/dL    Performed by Elias Hanley    CBC WITH AUTOMATED DIFF    Collection Time: 04/22/21  4:26 AM   Result Value Ref Range    WBC 5.8 3.6 - 11.0 K/uL    RBC 3.21 (L) 3.80 - 5.20 M/uL    HGB 9.0 (L) 11.5 - 16.0 g/dL    HCT 29.9 (L) 35.0 - 47.0 %    MCV 93.1 80.0 - 99.0 FL    MCH 28.0 26.0 - 34.0 PG    MCHC 30.1 30.0 - 36.5 g/dL    RDW 14.8 (H) 11.5 - 14.5 %    PLATELET 274 015 - 431 K/uL    MPV 8.6 (L) 8.9 - 12.9 FL    NRBC 0.0 0  WBC    ABSOLUTE NRBC 0.00 0.00 - 0.01 K/uL    NEUTROPHILS 54 32 - 75 %    LYMPHOCYTES 31 12 - 49 %    MONOCYTES 10 5 - 13 %    EOSINOPHILS 4 0 - 7 %    BASOPHILS 1 0 - 1 %    IMMATURE GRANULOCYTES 0 0.0 - 0.5 %    ABS. NEUTROPHILS 3.1 1.8 - 8.0 K/UL    ABS. LYMPHOCYTES 1.8 0.8 - 3.5 K/UL    ABS. MONOCYTES 0.6 0.0 - 1.0 K/UL    ABS. EOSINOPHILS 0.3 0.0 - 0.4 K/UL    ABS. BASOPHILS 0.0 0.0 - 0.1 K/UL    ABS. IMM. GRANS. 0.0 0.00 - 0.04 K/UL    DF AUTOMATED     METABOLIC PANEL, BASIC    Collection Time: 04/22/21  4:26 AM   Result Value Ref Range    Sodium 138 136 - 145 mmol/L    Potassium 4.6 3.5 - 5.1 mmol/L    Chloride 110 (H) 97 - 108 mmol/L    CO2 22 21 - 32 mmol/L    Anion gap 6 5 - 15 mmol/L    Glucose 74 65 - 100 mg/dL    BUN 16 6 - 20 MG/DL    Creatinine 0.56 0.55 - 1.02 MG/DL    BUN/Creatinine ratio 29 (H) 12 - 20      GFR est AA >60 >60 ml/min/1.73m2    GFR est non-AA >60 >60 ml/min/1.73m2    Calcium 7.4 (L) 8.5 - 10.1 MG/DL       Assessment:     Principal Problem:    Acute GI bleeding (4/18/2021)    Active Problems:    Coffee ground emesis (4/18/2021)      HTN (hypertension), benign (4/18/2021)      Hypothyroidism (4/18/2021)      Arthritis (4/18/2021)      Underweight (4/18/2021)        Plan:     1. From GI point of view can discharge on twice daily pantoprazole maintenance treatment. 2.  We will see again as needed  3.   Thanks again

## 2021-04-23 ENCOUNTER — APPOINTMENT (OUTPATIENT)
Dept: VASCULAR SURGERY | Age: 86
DRG: 377 | End: 2021-04-23
Attending: INTERNAL MEDICINE
Payer: MEDICARE

## 2021-04-23 ENCOUNTER — APPOINTMENT (OUTPATIENT)
Dept: CT IMAGING | Age: 86
DRG: 377 | End: 2021-04-23
Attending: INTERNAL MEDICINE
Payer: MEDICARE

## 2021-04-23 LAB
ANION GAP SERPL CALC-SCNC: 5 MMOL/L (ref 5–15)
APPEARANCE UR: CLEAR
BACTERIA URNS QL MICRO: NEGATIVE /HPF
BASOPHILS # BLD: 0 K/UL (ref 0–0.1)
BASOPHILS NFR BLD: 1 % (ref 0–1)
BILIRUB UR QL: NEGATIVE
BUN SERPL-MCNC: 13 MG/DL (ref 6–20)
BUN/CREAT SERPL: 24 (ref 12–20)
CALCIUM SERPL-MCNC: 7.8 MG/DL (ref 8.5–10.1)
CHLORIDE SERPL-SCNC: 107 MMOL/L (ref 97–108)
CK SERPL-CCNC: 28 U/L (ref 26–192)
CO2 SERPL-SCNC: 23 MMOL/L (ref 21–32)
COLOR UR: ABNORMAL
CREAT SERPL-MCNC: 0.55 MG/DL (ref 0.55–1.02)
DIFFERENTIAL METHOD BLD: ABNORMAL
EOSINOPHIL # BLD: 0.3 K/UL (ref 0–0.4)
EOSINOPHIL NFR BLD: 5 % (ref 0–7)
EPITH CASTS URNS QL MICRO: ABNORMAL /LPF
ERYTHROCYTE [DISTWIDTH] IN BLOOD BY AUTOMATED COUNT: 14.9 % (ref 11.5–14.5)
GLUCOSE SERPL-MCNC: 71 MG/DL (ref 65–100)
GLUCOSE UR STRIP.AUTO-MCNC: NEGATIVE MG/DL
HCT VFR BLD AUTO: 31.6 % (ref 35–47)
HGB BLD-MCNC: 9.9 G/DL (ref 11.5–16)
HGB UR QL STRIP: NEGATIVE
IMM GRANULOCYTES # BLD AUTO: 0 K/UL (ref 0–0.04)
IMM GRANULOCYTES NFR BLD AUTO: 0 % (ref 0–0.5)
KETONES UR QL STRIP.AUTO: NEGATIVE MG/DL
LEUKOCYTE ESTERASE UR QL STRIP.AUTO: ABNORMAL
LYMPHOCYTES # BLD: 1.7 K/UL (ref 0.8–3.5)
LYMPHOCYTES NFR BLD: 33 % (ref 12–49)
MCH RBC QN AUTO: 29.1 PG (ref 26–34)
MCHC RBC AUTO-ENTMCNC: 31.3 G/DL (ref 30–36.5)
MCV RBC AUTO: 92.9 FL (ref 80–99)
MONOCYTES # BLD: 0.5 K/UL (ref 0–1)
MONOCYTES NFR BLD: 9 % (ref 5–13)
NEUTS SEG # BLD: 2.7 K/UL (ref 1.8–8)
NEUTS SEG NFR BLD: 52 % (ref 32–75)
NITRITE UR QL STRIP.AUTO: NEGATIVE
NRBC # BLD: 0 K/UL (ref 0–0.01)
NRBC BLD-RTO: 0 PER 100 WBC
PH UR STRIP: 5 [PH] (ref 5–8)
PLATELET # BLD AUTO: 269 K/UL (ref 150–400)
PMV BLD AUTO: 8.5 FL (ref 8.9–12.9)
POTASSIUM SERPL-SCNC: 4.1 MMOL/L (ref 3.5–5.1)
PROT UR STRIP-MCNC: NEGATIVE MG/DL
RBC # BLD AUTO: 3.4 M/UL (ref 3.8–5.2)
RBC #/AREA URNS HPF: ABNORMAL /HPF (ref 0–5)
SODIUM SERPL-SCNC: 135 MMOL/L (ref 136–145)
SP GR UR REFRACTOMETRY: 1.02 (ref 1–1.03)
UA: UC IF INDICATED,UAUC: ABNORMAL
UROBILINOGEN UR QL STRIP.AUTO: 0.2 EU/DL (ref 0.2–1)
WBC # BLD AUTO: 5.2 K/UL (ref 3.6–11)
WBC URNS QL MICRO: ABNORMAL /HPF (ref 0–4)

## 2021-04-23 PROCEDURE — 80048 BASIC METABOLIC PNL TOTAL CA: CPT

## 2021-04-23 PROCEDURE — 74011250637 HC RX REV CODE- 250/637: Performed by: INTERNAL MEDICINE

## 2021-04-23 PROCEDURE — 93970 EXTREMITY STUDY: CPT

## 2021-04-23 PROCEDURE — 65660000000 HC RM CCU STEPDOWN

## 2021-04-23 PROCEDURE — 85025 COMPLETE CBC W/AUTO DIFF WBC: CPT

## 2021-04-23 PROCEDURE — 70450 CT HEAD/BRAIN W/O DYE: CPT

## 2021-04-23 PROCEDURE — 36415 COLL VENOUS BLD VENIPUNCTURE: CPT

## 2021-04-23 PROCEDURE — 81001 URINALYSIS AUTO W/SCOPE: CPT

## 2021-04-23 PROCEDURE — 82550 ASSAY OF CK (CPK): CPT

## 2021-04-23 RX ORDER — ACETAMINOPHEN 325 MG/1
650 TABLET ORAL
Qty: 30 TAB | Refills: 0 | Status: SHIPPED | OUTPATIENT
Start: 2021-04-23

## 2021-04-23 RX ORDER — HYDRALAZINE HYDROCHLORIDE 10 MG/1
10 TABLET, FILM COATED ORAL 2 TIMES DAILY
Status: DISCONTINUED | OUTPATIENT
Start: 2021-04-23 | End: 2021-04-23

## 2021-04-23 RX ORDER — HYDRALAZINE HYDROCHLORIDE 10 MG/1
5 TABLET, FILM COATED ORAL 2 TIMES DAILY
Status: DISCONTINUED | OUTPATIENT
Start: 2021-04-24 | End: 2021-04-23

## 2021-04-23 RX ORDER — LEVOTHYROXINE SODIUM 50 UG/1
50 TABLET ORAL
Qty: 30 TAB | Refills: 0 | Status: SHIPPED | OUTPATIENT
Start: 2021-04-23

## 2021-04-23 RX ORDER — PANTOPRAZOLE SODIUM 40 MG/1
40 TABLET, DELAYED RELEASE ORAL
Qty: 60 TAB | Refills: 0 | Status: SHIPPED | OUTPATIENT
Start: 2021-04-23 | End: 2021-06-22

## 2021-04-23 RX ORDER — HYDRALAZINE HYDROCHLORIDE 10 MG/1
5 TABLET, FILM COATED ORAL 2 TIMES DAILY
Status: DISCONTINUED | OUTPATIENT
Start: 2021-04-24 | End: 2021-04-25 | Stop reason: HOSPADM

## 2021-04-23 RX ORDER — HYDRALAZINE HYDROCHLORIDE 10 MG/1
10 TABLET, FILM COATED ORAL 2 TIMES DAILY
Qty: 60 TAB | Refills: 0 | Status: SHIPPED | OUTPATIENT
Start: 2021-04-23 | End: 2021-04-25

## 2021-04-23 RX ADMIN — LEVOTHYROXINE SODIUM 50 MCG: 0.05 TABLET ORAL at 06:03

## 2021-04-23 RX ADMIN — HYDRALAZINE HYDROCHLORIDE 10 MG: 10 TABLET, FILM COATED ORAL at 12:32

## 2021-04-23 RX ADMIN — Medication 10 ML: at 22:00

## 2021-04-23 RX ADMIN — PANTOPRAZOLE SODIUM 40 MG: 40 TABLET, DELAYED RELEASE ORAL at 06:03

## 2021-04-23 RX ADMIN — Medication 10 ML: at 06:02

## 2021-04-23 NOTE — PROGRESS NOTES
Juventino Cano [facility] [address]  [address] [place] [address]  [phone]      Medical Progress Note      NAME: Juju Goodwin   :  1930  MRM:  [id number]    Date/Time of service: 2021  3:07 PM       Subjective:     Chief Complaint:  Patient was personally seen and examined by me during this time period. Chart reviewed. Somnolent today. Unable to obtain reliable ROS due to dementia. Objective:       Vitals:       Last 24hrs VS reviewed since prior progress note.  Most recent are:    Visit Vitals  BP (!) 151/70 (BP 1 Location: Left upper arm, BP Patient Position: At rest;Lying)   Pulse (!) 59   Temp 97 °F (36.1 °C)   Resp 16   Ht 4' 8\" (1.422 m)   Wt 36.3 kg (80 lb)   SpO2 98%   BMI 17.94 kg/m²     SpO2 Readings from Last 6 Encounters:   21 98%            Intake/Output Summary (Last 24 hours) at 2021 1507  Last data filed at 2021 1319  Gross per 24 hour   Intake 50 ml   Output 850 ml   Net -800 ml        Exam:     Physical Exam:    Gen:  frail, in no acute distress  HEENT:  Pink conjunctivae, PERRL  Resp:  No accessory muscle use, clear breath sounds without wheezes rales or rhonchi  Card:  RRR, No murmurs, normal S1, S2, b/l peripheral edema  Abd:  Soft, non-tender, non-distended, normoactive bowel sounds are present  Musc:  No cyanosis or clubbing, lower extremity tenderness to palpation  Skin:  skin turgor is good  Neuro:  somnolent but arousable   Psych:  Poor insight      Medications Reviewed: (see below)    Lab Data Reviewed: (see below)    ______________________________________________________________________    Medications:     Current Facility-Administered Medications   Medication Dose Route Frequency    hydrALAZINE (APRESOLINE) tablet 10 mg  10 mg Oral BID    levothyroxine (SYNTHROID) tablet 50 mcg  50 mcg Oral ACB    glucose chewable tablet 16 g  4 Tab Oral PRN    dextrose (D50W) injection syrg 12.5-25 g  12.5-25 g IntraVENous PRN    glucagon (GLUCAGEN) injection 1 mg  1 mg IntraMUSCular PRN    pantoprazole (PROTONIX) tablet 40 mg  40 mg Oral ACB&D    sodium chloride (NS) flush 5-40 mL  5-40 mL IntraVENous Q8H    sodium chloride (NS) flush 5-40 mL  5-40 mL IntraVENous PRN    acetaminophen (TYLENOL) tablet 650 mg  650 mg Oral Q6H PRN    Or    acetaminophen (TYLENOL) suppository 650 mg  650 mg Rectal Q6H PRN    polyethylene glycol (MIRALAX) packet 17 g  17 g Oral DAILY PRN    senna (SENOKOT) tablet 8.6 mg  1 Tab Oral DAILY PRN    promethazine (PHENERGAN) tablet 12.5 mg  12.5 mg Oral Q6H PRN    Or    ondansetron (ZOFRAN) injection 4 mg  4 mg IntraVENous Q6H PRN    0.9% sodium chloride infusion 250 mL  250 mL IntraVENous PRN          Lab Review:     Recent Labs     04/23/21  0830 04/22/21  0426 04/21/21  0523   WBC 5.2 5.8 5.5   HGB 9.9* 9.0* 8.0*   HCT 31.6* 29.9* 26.5*    272 277     Recent Labs     04/23/21  0830 04/22/21  0426 04/21/21  0523   * 138 141   K 4.1 4.6 3.2*    110* 110*   CO2 23 22 26   GLU 71 74 87   BUN 13 16 18   CREA 0.55 0.56 0.56   CA 7.8* 7.4* 7.5*     Lab Results   Component Value Date/Time    Glucose (POC) 113 (H) 04/21/2021 04:06 PM    Glucose (POC) 127 (H) 04/21/2021 12:30 PM    Glucose (POC) 104 (H) 04/20/2021 09:28 PM    Glucose (POC) 107 (H) 04/20/2021 05:12 PM    Glucose (POC) 186 (H) 04/20/2021 06:22 AM          Assessment / Plan:     Encephalopathy   - at baseline confused but currently worse then baseline with somnolence   - likely due to rhinovirus   - obtain ct head wo   -monitor     Febrile  -likely due to rhinovirus   - RVP positive for rhinovirus   -Chest x-ray w/o infiltrates, repeat UA unremarkable for infection - completed abx course,  venous Dopplers neg    Acute GI bleeding (4/18/2021) / Coffee ground emesis (4/18/2021) POA:   -likely due to NSAIDs induced PUD  -s/p transfusion 4/18, Hgb has been stable   -GI evaluated, held off on EGD bc son wanted to take a conservative approach   - c/w PPI     Arrhythmia/afib  -concern for 2nd or 3rd degree heart block  -no AC for afib   -cardiology consulted      HTN (hypertension), benign (4/18/2021) POA:   -added back low dose amlodipine, discussed with cardio but remained with BP remains high, will change to PO hydrazine since do not want titrate amlodipine up due to HR concerns   -liberal BP control due to age      Hypothyroidism (4/18/2021) POA   -TSH normal.   -cont LT4     Anemia POA: unclear baseline.   -Likely due to blood loss. -monitor and transfuse if h/h drop <7     Arthritis (4/18/2021) POA: Tylenol PRN     Underweight (4/18/2021) POA: likely from advanced age, poor intake vs other.  Diet as tolerated      Total time spent with patient: 35 Minutes **I personally saw and examined the patient during this time period**                 Care Plan discussed with: Care Manager and Nursing Staff    Discussed:  Care Plan    Prophylaxis:  SCD's    Disposition:  assisted living           ___________________________________________________    Attending Physician: Lusi Olvera DO

## 2021-04-23 NOTE — PROGRESS NOTES
Problem: Falls - Risk of  Goal: *Absence of Falls  Description: Document Lear Shaker Fall Risk and appropriate interventions in the flowsheet. Outcome: Progressing Towards Goal  Note: Fall Risk Interventions:  Mobility Interventions: Bed/chair exit alarm, Communicate number of staff needed for ambulation/transfer, OT consult for ADLs, Patient to call before getting OOB, PT Consult for mobility concerns, Strengthening exercises (ROM-active/passive), PT Consult for assist device competence, Utilize walker, cane, or other assistive device, Utilize gait belt for transfers/ambulation    Mentation Interventions: Adequate sleep, hydration, pain control, Bed/chair exit alarm, Evaluate medications/consider consulting pharmacy, Door open when patient unattended, Gait belt with transfers/ambulation, Increase mobility, More frequent rounding, Reorient patient, Room close to nurse's station, Toileting rounds, Update white board    Medication Interventions: Utilize gait belt for transfers/ambulation    Elimination Interventions: Bed/chair exit alarm, Call light in reach, Elevated toilet seat, Patient to call for help with toileting needs, Stay With Me (per policy), Toilet paper/wipes in reach, Toileting schedule/hourly rounds              Problem: Patient Education: Go to Patient Education Activity  Goal: Patient/Family Education  Outcome: Progressing Towards Goal     Problem: Pressure Injury - Risk of  Goal: *Prevention of pressure injury  Description: Document [place] Scale and appropriate interventions in the flowsheet. Outcome: Progressing Towards Goal  Note: Pressure Injury Interventions:  Sensory Interventions: Assess changes in LOC, Assess need for specialty bed, Discuss PT/OT consult with provider, Float heels, Keep linens dry and wrinkle-free, Pressure redistribution bed/mattress (bed type), Turn and reposition approx.  every two hours (pillows and wedges if needed)    Moisture Interventions: Check for incontinence Q2 hours and as needed, Internal/External urinary devices, Limit adult briefs    Activity Interventions: Increase time out of bed, Pressure redistribution bed/mattress(bed type), PT/OT evaluation    Mobility Interventions: Pressure redistribution bed/mattress (bed type), PT/OT evaluation, Turn and reposition approx.  every two hours(pillow and wedges)    Nutrition Interventions: Offer support with meals,snacks and hydration    Friction and Shear Interventions: Lift team/patient mobility team                Problem: Patient Education: Go to Patient Education Activity  Goal: Patient/Family Education  Outcome: Progressing Towards Goal     Problem: Patient Education: Go to Patient Education Activity  Goal: Patient/Family Education  Outcome: Progressing Towards Goal     Problem: Patient Education: Go to Patient Education Activity  Goal: Patient/Family Education  Outcome: Progressing Towards Goal

## 2021-04-23 NOTE — PROGRESS NOTES
4/23/2021   1:52 PM    Discharge cancelled for today as pt is lethargic and requires more medical work-up. [place] at [facility], and pt's son, Kelley Ervin, notified. Anaid with [facility] reported that they are able to accept pt over the weekend. If medically cleared for discharge and closer to baseline for ambulation, Nursing, please call report to attending nurse at [phone]. Please also call pt's son, Kelley Ervin, and notify him. Pt's son will provide transport. 9:55 AM  RUR:  9%  Risk Level: [x]Low []Moderate []High  Value-based purchasing: [] Yes [x] No  Bundle patient: [] Yes [x] No   Specify:     Transition of care plan:  1. Discharge planned for today. Pt has medically cleared. 2. Assisted Living Facility - Pt will discharge to [facility] today. CM confirmed with liaison at [facility] that they can take pt today, and they are aware pt is positive for Rhinovirus and Enterovirus. Nursing, please call report to [address] [place] [address] & [facility] David Shine, at [phone]. Clinicals to be emailed via [employer] to Anaid at [email or web address].   3. Medicare letter left in pt's room for pt's son to review. Pt's son notified of letter, and is agreeable to discharge. 4. Outpatient follow-up. 5. Pt's family to transport. Care Management Interventions  PCP Verified by CM: Yes(No PCP. Pt's son prefers [address])  Mode of Transport at Discharge:  Other (see comment)(TBD)  MyChart Signup: No  Discharge Durable Medical Equipment: No  Physical Therapy Consult: Yes  Occupational Therapy Consult: Yes  Speech Therapy Consult: No  Current Support Network: Relative's Home, Has Personal Caregivers  Confirm Follow Up Transport: Family  Discharge Location  Discharge Placement: Assisted Living

## 2021-04-23 NOTE — PROGRESS NOTES
See Discharge Tye in pat chart. Placed monitor on patient. Patient was unable to take instruction. Spoke with RN and explained monitor needs to be within 30 feet of patient. Equipment bag and accessories left on patient table with monitor, must go home with patient.

## 2021-04-24 LAB
ANION GAP SERPL CALC-SCNC: 6 MMOL/L (ref 5–15)
BASE DEFICIT BLDV-SCNC: 5.2 MMOL/L
BASOPHILS # BLD: 0 K/UL (ref 0–0.1)
BASOPHILS NFR BLD: 1 % (ref 0–1)
BDY SITE: ABNORMAL
BUN SERPL-MCNC: 12 MG/DL (ref 6–20)
BUN/CREAT SERPL: 24 (ref 12–20)
CALCIUM SERPL-MCNC: 7.5 MG/DL (ref 8.5–10.1)
CHLORIDE SERPL-SCNC: 107 MMOL/L (ref 97–108)
CO2 SERPL-SCNC: 24 MMOL/L (ref 21–32)
CREAT SERPL-MCNC: 0.5 MG/DL (ref 0.55–1.02)
DIFFERENTIAL METHOD BLD: ABNORMAL
EOSINOPHIL # BLD: 0.2 K/UL (ref 0–0.4)
EOSINOPHIL NFR BLD: 4 % (ref 0–7)
ERYTHROCYTE [DISTWIDTH] IN BLOOD BY AUTOMATED COUNT: 14.6 % (ref 11.5–14.5)
FIO2 ON VENT: 21 %
GAS FLOW.O2 O2 DELIVERY SYS: 0 L/MIN
GLUCOSE SERPL-MCNC: 68 MG/DL (ref 65–100)
HCO3 BLDV-SCNC: 21 MMOL/L (ref 23–28)
HCT VFR BLD AUTO: 29.5 % (ref 35–47)
HGB BLD-MCNC: 8.9 G/DL (ref 11.5–16)
IMM GRANULOCYTES # BLD AUTO: 0 K/UL (ref 0–0.04)
IMM GRANULOCYTES NFR BLD AUTO: 0 % (ref 0–0.5)
LYMPHOCYTES # BLD: 1.7 K/UL (ref 0.8–3.5)
LYMPHOCYTES NFR BLD: 32 % (ref 12–49)
MCH RBC QN AUTO: 28.2 PG (ref 26–34)
MCHC RBC AUTO-ENTMCNC: 30.2 G/DL (ref 30–36.5)
MCV RBC AUTO: 93.4 FL (ref 80–99)
MONOCYTES # BLD: 0.5 K/UL (ref 0–1)
MONOCYTES NFR BLD: 9 % (ref 5–13)
NEUTS SEG # BLD: 2.8 K/UL (ref 1.8–8)
NEUTS SEG NFR BLD: 54 % (ref 32–75)
NRBC # BLD: 0 K/UL (ref 0–0.01)
NRBC BLD-RTO: 0 PER 100 WBC
PCO2 BLDV: 42 MMHG (ref 41–51)
PH BLDV: 7.31 [PH] (ref 7.32–7.42)
PLATELET # BLD AUTO: 248 K/UL (ref 150–400)
PMV BLD AUTO: 8.4 FL (ref 8.9–12.9)
PO2 BLDV: 46 MMHG (ref 25–40)
POTASSIUM SERPL-SCNC: 4.1 MMOL/L (ref 3.5–5.1)
RBC # BLD AUTO: 3.16 M/UL (ref 3.8–5.2)
SAO2 % BLDV: 78 % (ref 65–88)
SAO2% DEVICE SAO2% SENSOR NAME: ABNORMAL
SODIUM SERPL-SCNC: 137 MMOL/L (ref 136–145)
SPECIMEN SITE: ABNORMAL
TROPONIN I SERPL-MCNC: <0.05 NG/ML
WBC # BLD AUTO: 5.2 K/UL (ref 3.6–11)

## 2021-04-24 PROCEDURE — 74011000250 HC RX REV CODE- 250: Performed by: INTERNAL MEDICINE

## 2021-04-24 PROCEDURE — 82803 BLOOD GASES ANY COMBINATION: CPT

## 2021-04-24 PROCEDURE — 36415 COLL VENOUS BLD VENIPUNCTURE: CPT

## 2021-04-24 PROCEDURE — 85025 COMPLETE CBC W/AUTO DIFF WBC: CPT

## 2021-04-24 PROCEDURE — 74011250637 HC RX REV CODE- 250/637: Performed by: INTERNAL MEDICINE

## 2021-04-24 PROCEDURE — 80048 BASIC METABOLIC PNL TOTAL CA: CPT

## 2021-04-24 PROCEDURE — 84484 ASSAY OF TROPONIN QUANT: CPT

## 2021-04-24 PROCEDURE — 99231 SBSQ HOSP IP/OBS SF/LOW 25: CPT | Performed by: INTERNAL MEDICINE

## 2021-04-24 PROCEDURE — 94760 N-INVAS EAR/PLS OXIMETRY 1: CPT

## 2021-04-24 PROCEDURE — 65660000000 HC RM CCU STEPDOWN

## 2021-04-24 RX ORDER — DEXTROSE MONOHYDRATE AND SODIUM CHLORIDE 5; .45 G/100ML; G/100ML
75 INJECTION, SOLUTION INTRAVENOUS CONTINUOUS
Status: DISCONTINUED | OUTPATIENT
Start: 2021-04-24 | End: 2021-04-25 | Stop reason: HOSPADM

## 2021-04-24 RX ADMIN — Medication 10 ML: at 20:49

## 2021-04-24 RX ADMIN — HYDRALAZINE HYDROCHLORIDE 5 MG: 10 TABLET, FILM COATED ORAL at 17:15

## 2021-04-24 RX ADMIN — LEVOTHYROXINE SODIUM 50 MCG: 0.05 TABLET ORAL at 07:34

## 2021-04-24 RX ADMIN — DEXTROSE AND SODIUM CHLORIDE 75 ML/HR: 5; 450 INJECTION, SOLUTION INTRAVENOUS at 13:51

## 2021-04-24 RX ADMIN — Medication 10 ML: at 06:17

## 2021-04-24 RX ADMIN — PANTOPRAZOLE SODIUM 40 MG: 40 TABLET, DELAYED RELEASE ORAL at 07:34

## 2021-04-24 RX ADMIN — HYDRALAZINE HYDROCHLORIDE 5 MG: 10 TABLET, FILM COATED ORAL at 07:34

## 2021-04-24 RX ADMIN — PANTOPRAZOLE SODIUM 40 MG: 40 TABLET, DELAYED RELEASE ORAL at 17:14

## 2021-04-24 NOTE — PROGRESS NOTES
Juventino Cano [facility] [place] [facility]  [address]  [phone]      Medical Progress Note      NAME: Selena Marina   :  1930  MRM:  [id number]    Date/Time of service: 2021  2:40 PM       Subjective:     Chief Complaint:  Patient was personally seen and examined by me during this time period. Chart reviewed. Somnolent again today, but slightly improved. Unable to obtain reliable ROS due to dementia. Objective:       Vitals:       Last 24hrs VS reviewed since prior progress note.  Most recent are:    Visit Vitals  /65 (BP 1 Location: Right upper arm, BP Patient Position: At rest)   Pulse 86   Temp 97.9 °F (36.6 °C)   Resp 18   Ht 4' 8\" (1.422 m)   Wt 36.3 kg (80 lb)   SpO2 96%   BMI 17.94 kg/m²     SpO2 Readings from Last 6 Encounters:   21 96%            Intake/Output Summary (Last 24 hours) at 2021 1439  Last data filed at 2021 0129  Gross per 24 hour   Intake 0 ml   Output 250 ml   Net -250 ml        Exam:     Physical Exam:    Gen:  frail, in no acute distress  HEENT:  Pink conjunctivae, PERRL  Resp:  No accessory muscle use, clear breath sounds without wheezes rales or rhonchi  Card:  RRR, No murmurs, normal S1, S2, b/l peripheral edema  Abd:  Soft, non-tender, non-distended, normoactive bowel sounds are present  Musc:  No cyanosis or clubbing, lower extremity tenderness to palpation  Skin:  skin turgor is good  Neuro:  somnolent but arousable   Psych:  Poor insight      Medications Reviewed: (see below)    Lab Data Reviewed: (see below)    ______________________________________________________________________    Medications:     Current Facility-Administered Medications   Medication Dose Route Frequency    dextrose 5 % - 0.45% NaCl infusion  75 mL/hr IntraVENous CONTINUOUS    hydrALAZINE (APRESOLINE) tablet 5 mg  5 mg Oral BID    levothyroxine (SYNTHROID) tablet 50 mcg  50 mcg Oral ACB    glucose chewable tablet 16 g  4 Tab Oral PRN    dextrose (D50W) injection syrg 12.5-25 g  12.5-25 g IntraVENous PRN    glucagon (GLUCAGEN) injection 1 mg  1 mg IntraMUSCular PRN    pantoprazole (PROTONIX) tablet 40 mg  40 mg Oral ACB&D    sodium chloride (NS) flush 5-40 mL  5-40 mL IntraVENous Q8H    sodium chloride (NS) flush 5-40 mL  5-40 mL IntraVENous PRN    acetaminophen (TYLENOL) tablet 650 mg  650 mg Oral Q6H PRN    Or    acetaminophen (TYLENOL) suppository 650 mg  650 mg Rectal Q6H PRN    polyethylene glycol (MIRALAX) packet 17 g  17 g Oral DAILY PRN    senna (SENOKOT) tablet 8.6 mg  1 Tab Oral DAILY PRN    promethazine (PHENERGAN) tablet 12.5 mg  12.5 mg Oral Q6H PRN    Or    ondansetron (ZOFRAN) injection 4 mg  4 mg IntraVENous Q6H PRN    0.9% sodium chloride infusion 250 mL  250 mL IntraVENous PRN          Lab Review:     Recent Labs     04/24/21  0301 04/23/21  0830 04/22/21  0426   WBC 5.2 5.2 5.8   HGB 8.9* 9.9* 9.0*   HCT 29.5* 31.6* 29.9*    269 272     Recent Labs     04/24/21  0301 04/23/21  0830 04/22/21  0426    135* 138   K 4.1 4.1 4.6    107 110*   CO2 24 23 22   GLU 68 71 74   BUN 12 13 16   CREA 0.50* 0.55 0.56   CA 7.5* 7.8* 7.4*     Lab Results   Component Value Date/Time    Glucose (POC) 113 (H) 04/21/2021 04:06 PM    Glucose (POC) 127 (H) 04/21/2021 12:30 PM    Glucose (POC) 104 (H) 04/20/2021 09:28 PM    Glucose (POC) 107 (H) 04/20/2021 05:12 PM    Glucose (POC) 186 (H) 04/20/2021 06:22 AM          Assessment / Plan:     Encephalopathy   -slightly improvef  - at baseline confused but not somnolent  - likely due to rhinovirus, start maintance IVF  - ct head wo unremarkable, abd with w/o hypercapnia  -monitor     Febrile  -likely due to rhinovirus   - RVP positive for rhinovirus   -Chest x-ray w/o infiltrates, repeat UA unremarkable for infection - completed abx course,  venous Dopplers neg  -tylenol PRN     Acute GI bleeding (4/18/2021) / Coffee ground emesis (4/18/2021) POA:   -likely due to NSAIDs induced PUD  -s/p transfusion 4/18, Hgb has been stable   -GI evaluated, held off on EGD bc son wanted to take a conservative approach   - c/w PPI     Arrhythmia/afib  -concern for 2nd or 3rd degree heart block  -no AC for afib   -cardiology consulted      HTN (hypertension), benign (4/18/2021) POA:   -added back low dose amlodipine, discussed with cardio but remained with BP remains high, will change to PO hydrazine since do not want titrate amlodipine up due to HR concerns   -liberal BP control due to age      Hypothyroidism (4/18/2021) POA   -TSH normal.   -cont LT4     Anemia POA: unclear baseline.   -Likely due to blood loss. -monitor and transfuse if h/h drop <7     Arthritis (4/18/2021) POA: Tylenol PRN     Underweight (4/18/2021) POA: likely from advanced age, poor intake vs other.  Diet as tolerated      Total time spent with patient: 28 Minutes **I personally saw and examined the patient during this time period**                 Care Plan discussed with: Care Manager and Nursing Staff    Discussed:  Care Plan    Prophylaxis:  SCD's    Disposition:  assisted living           ___________________________________________________    Attending Physician: Rhianna Duggan DO

## 2021-04-24 NOTE — PROGRESS NOTES
Reviewed Tele data. There has been no significant pauses in past 24 hrs. There have been compensatory pause after PAC which are normal.   There have been [place] type I second degree AVB noted in past 24 hrs. Avoid AVN blocking agents. 30 day monitor as OP. Gavin Martinez MD, [facility]

## 2021-04-24 NOTE — PROGRESS NOTES
4/24/2021 2:52 PM Received call from pt's son, Karina Presser requesting update on pt's status. CM relayed per MD pt not ready for discharge today. CM relayed this was relayed to Russ Vega at [facility]. Pt's son was understanding and requested call from pt's MD. CM relayed to pt's attending. 4/24/2021 1:04 PM Per MD pt not ready for discharge today, possible for tomorrow. NOAH called to [facility] at [facility] at [phone] to relay plan for tomorrow discharge, no answer and no option to lvm. CM called and spoke with Anaid at [facility], relayed no discharge today but possible tomorrow. Anaid reported she will relay this to the facility.   Jona Palomino, BSW

## 2021-04-25 VITALS
DIASTOLIC BLOOD PRESSURE: 69 MMHG | WEIGHT: 80 LBS | RESPIRATION RATE: 16 BRPM | HEIGHT: 56 IN | BODY MASS INDEX: 18 KG/M2 | SYSTOLIC BLOOD PRESSURE: 109 MMHG | HEART RATE: 76 BPM | OXYGEN SATURATION: 93 % | TEMPERATURE: 97.5 F

## 2021-04-25 LAB
ANION GAP SERPL CALC-SCNC: 6 MMOL/L (ref 5–15)
BASOPHILS # BLD: 0 K/UL (ref 0–0.1)
BASOPHILS NFR BLD: 1 % (ref 0–1)
BUN SERPL-MCNC: 11 MG/DL (ref 6–20)
BUN/CREAT SERPL: 23 (ref 12–20)
CALCIUM SERPL-MCNC: 7.6 MG/DL (ref 8.5–10.1)
CHLORIDE SERPL-SCNC: 106 MMOL/L (ref 97–108)
CO2 SERPL-SCNC: 26 MMOL/L (ref 21–32)
CREAT SERPL-MCNC: 0.48 MG/DL (ref 0.55–1.02)
DIFFERENTIAL METHOD BLD: ABNORMAL
EOSINOPHIL # BLD: 0.2 K/UL (ref 0–0.4)
EOSINOPHIL NFR BLD: 3 % (ref 0–7)
ERYTHROCYTE [DISTWIDTH] IN BLOOD BY AUTOMATED COUNT: 14.8 % (ref 11.5–14.5)
GLUCOSE SERPL-MCNC: 112 MG/DL (ref 65–100)
HCT VFR BLD AUTO: 29.1 % (ref 35–47)
HGB BLD-MCNC: 8.8 G/DL (ref 11.5–16)
IMM GRANULOCYTES # BLD AUTO: 0 K/UL (ref 0–0.04)
IMM GRANULOCYTES NFR BLD AUTO: 0 % (ref 0–0.5)
LYMPHOCYTES # BLD: 1.7 K/UL (ref 0.8–3.5)
LYMPHOCYTES NFR BLD: 32 % (ref 12–49)
MCH RBC QN AUTO: 28.2 PG (ref 26–34)
MCHC RBC AUTO-ENTMCNC: 30.2 G/DL (ref 30–36.5)
MCV RBC AUTO: 93.3 FL (ref 80–99)
MONOCYTES # BLD: 0.5 K/UL (ref 0–1)
MONOCYTES NFR BLD: 9 % (ref 5–13)
NEUTS SEG # BLD: 2.9 K/UL (ref 1.8–8)
NEUTS SEG NFR BLD: 55 % (ref 32–75)
NRBC # BLD: 0 K/UL (ref 0–0.01)
NRBC BLD-RTO: 0 PER 100 WBC
PLATELET # BLD AUTO: 260 K/UL (ref 150–400)
PMV BLD AUTO: 9.1 FL (ref 8.9–12.9)
POTASSIUM SERPL-SCNC: 3.7 MMOL/L (ref 3.5–5.1)
RBC # BLD AUTO: 3.12 M/UL (ref 3.8–5.2)
SODIUM SERPL-SCNC: 138 MMOL/L (ref 136–145)
WBC # BLD AUTO: 5.3 K/UL (ref 3.6–11)

## 2021-04-25 PROCEDURE — 85025 COMPLETE CBC W/AUTO DIFF WBC: CPT

## 2021-04-25 PROCEDURE — 74011000250 HC RX REV CODE- 250: Performed by: INTERNAL MEDICINE

## 2021-04-25 PROCEDURE — 74011250637 HC RX REV CODE- 250/637: Performed by: INTERNAL MEDICINE

## 2021-04-25 PROCEDURE — 80048 BASIC METABOLIC PNL TOTAL CA: CPT

## 2021-04-25 PROCEDURE — 36415 COLL VENOUS BLD VENIPUNCTURE: CPT

## 2021-04-25 RX ORDER — HYDRALAZINE HYDROCHLORIDE 10 MG/1
5 TABLET, FILM COATED ORAL 2 TIMES DAILY
Qty: 90 TAB | Refills: 0 | Status: SHIPPED | OUTPATIENT
Start: 2021-04-25

## 2021-04-25 RX ADMIN — HYDRALAZINE HYDROCHLORIDE 5 MG: 10 TABLET, FILM COATED ORAL at 10:03

## 2021-04-25 RX ADMIN — PANTOPRAZOLE SODIUM 40 MG: 40 TABLET, DELAYED RELEASE ORAL at 06:58

## 2021-04-25 RX ADMIN — LEVOTHYROXINE SODIUM 50 MCG: 0.05 TABLET ORAL at 06:58

## 2021-04-25 RX ADMIN — Medication 10 ML: at 06:59

## 2021-04-25 RX ADMIN — Medication 10 ML: at 13:00

## 2021-04-25 RX ADMIN — DEXTROSE AND SODIUM CHLORIDE 75 ML/HR: 5; 450 INJECTION, SOLUTION INTRAVENOUS at 06:58

## 2021-04-25 NOTE — PROGRESS NOTES
4/25/2021 1:10 PM Pt's avs faxed to [facility] at [phone].     4/25/2021 10:26 AM Per MD pt ready for discharge today. CM called to [facility] spoke with [facility] and relayed pt will discharge today, confirmed they can accept pt. CM confirmed timing for discharge between 1-2PM.  CM called pt's son, Yasmine Rothman, relayed pt is ready for discharge today. Pt's son confirmed he will transport pt to [employer] and will be to [facility] between 1-2PM. Pt's son aware pt will receive lunch prior to discharge. Pt's son mentioned pt mainly eats scrambled eggs. CM called to nutrition services and requested pt have scrambled eggs for lunch. ABBEY PimentelW     Nursing please call report to [facility] prior to pt leaving [facility] at [phone].

## 2021-04-25 NOTE — ACP (ADVANCE CARE PLANNING)
Advance Care Planning     Advance Care Planning (ACP) Physician/NP/PA Conversation      Date of Conversation: 4/18/2021  Conducted with: son    Healthcare Decision Maker:     Primary Decision Maker: Kasey Armas - Child - [phone]    Primary Decision Maker: Sean Syed - Daughter-in-Law - [phone]      Care Preferences:      Ventilation: \"If you were unable to breathe on your own and your chance of recovery was unlikely, what would be your preference about the use of a ventilator (breathing machine) if it was available to you? \"   The patient would NOT desire the use of a ventilator. Resuscitation: \"In the event your heart stopped as a result of an underlying serious health condition, would you want attempts to be made to restart your heart, or would you prefer a natural death? \"   No, do NOT attempt to resuscitate. Additional topics discussed: treatment goals    Conversation Outcomes / Follow-Up Plan:   Discussed encephalopathy, neg findings on ct head, troponin and thyroid studies. Discussed findings of rhinovirus on RVP after fever two days ago, and likely encephalopathy from rhinovirus. Requested cardio re-evaluate rhythm strips, and plan for 30 day monitor OP. Discussed code status; son states patient would not want CPR/intubation.      Length of Voluntary ACP Conversation in minutes:  16 minutes    Helena Cotto DO

## 2021-04-25 NOTE — PROGRESS NOTES
Problem: Falls - Risk of  Goal: *Absence of Falls  Description: Document Sandeep Martinez Fall Risk and appropriate interventions in the flowsheet. Outcome: Resolved/Met  Note: Fall Risk Interventions:  Mobility Interventions: Communicate number of staff needed for ambulation/transfer    Mentation Interventions: Door open when patient unattended, Evaluate medications/consider consulting pharmacy    Medication Interventions: Evaluate medications/consider consulting pharmacy    Elimination Interventions: Patient to call for help with toileting needs              Problem: Patient Education: Go to Patient Education Activity  Goal: Patient/Family Education  Outcome: Resolved/Met     Problem: Pressure Injury - Risk of  Goal: *Prevention of pressure injury  Description: Document [place] and appropriate interventions in the flowsheet.   Outcome: Resolved/Met  Note: Pressure Injury Interventions:  Sensory Interventions: Assess changes in LOC    Moisture Interventions: Absorbent underpads    Activity Interventions: Increase time out of bed, Pressure redistribution bed/mattress(bed type)    Mobility Interventions: HOB 30 degrees or less, Pressure redistribution bed/mattress (bed type)    Nutrition Interventions: Document food/fluid/supplement intake    Friction and Shear Interventions: HOB 30 degrees or less, Lift sheet                Problem: Patient Education: Go to Patient Education Activity  Goal: Patient/Family Education  Outcome: Resolved/Met     Problem: Patient Education: Go to Patient Education Activity  Goal: Patient/Family Education  Outcome: Resolved/Met     Problem: Patient Education: Go to Patient Education Activity  Goal: Patient/Family Education  Outcome: Resolved/Met

## 2021-04-25 NOTE — DISCHARGE INSTRUCTIONS
HOSPITALIST DISCHARGE INSTRUCTIONS  NAME: Martha Winters   :  1930   MRN:  [id number]     Date/Time:  2021 12:10 PM    ADMIT DATE: 2021     DISCHARGE DATE: 2021     ADMITTING DIAGNOSIS:  GI bleed     DISCHARGE DIAGNOSIS:  GI bleed   Rhinovirus    Patient Education        Gastrointestinal Bleeding: Care Instructions  Your Care Instructions     The digestive or gastrointestinal tract goes from the mouth to the anus. It is often called the GI tract. Bleeding can happen anywhere in the GI tract. It may be caused by an ulcer, an infection, or cancer. It may also be caused by medicines such as aspirin or ibuprofen. Light bleeding may not cause any symptoms at first. But if you continue to bleed for a while, you may feel very weak or tired. Sudden, heavy bleeding means you need to see a doctor right away. This kind of bleeding can be very dangerous. But it can usually be cured or controlled. The doctor may do some tests to find the cause of your bleeding. Follow-up care is a key part of your treatment and safety. Be sure to make and go to all appointments, and call your doctor if you are having problems. It's also a good idea to know your test results and keep a list of the medicines you take. How can you care for yourself at home? · Be safe with medicines. Take your medicines exactly as prescribed. Call your doctor if you think you are having a problem with your medicine. You will get more details on the specific medicines your doctor prescribes. · Do not take aspirin or other anti-inflammatory medicines, such as naproxen (Aleve) or ibuprofen (Advil, Motrin), without talking to your doctor first. Ask your doctor if it is okay to use acetaminophen (Tylenol). · Do not drink alcohol. · The bleeding may make you lose iron. So it's important to eat foods that have a lot of iron. These include red meat, shellfish, poultry, and eggs.  They also include beans, raisins, whole-grain breads, and leafy green vegetables. If you want help planning meals, you can make an appointment with a dietitian. When should you call for help? Call 911 anytime you think you may need emergency care. For example, call if:    · You have sudden, severe belly pain.     · You vomit blood or what looks like coffee grounds.     · You passed out (lost consciousness).     · Your stools are maroon or very bloody. Call your doctor now or seek immediate medical care if:    · You are dizzy or lightheaded, or you feel like you may faint.     · Your stools are black and look like tar, or they have streaks of blood.     · You have belly pain.     · You vomit or have nausea.     · You have trouble swallowing, or it hurts when you swallow. Watch closely for changes in your health, and be sure to contact your doctor if:    · You do not get better as expected. Where can you learn more? Go to http://www.gray.com/  Enter F981 in the search box to learn more about \"Gastrointestinal Bleeding: Care Instructions. \"  Current as of: February 26, 2020               Content Version: 12.8  © 6715-8063 [employer]. Care instructions adapted under license by [employer] (which disclaims liability or warranty for this information). If you have questions about a medical condition or this instruction, always ask your healthcare professional. [address] any warranty or liability for your use of this information. MEDICATIONS:    · It is important that you take the medication exactly as they are prescribed. · Keep your medication in the bottles provided by the pharmacist and keep a list of the medication names, dosages, and times to be taken in your wallet.    · Do not take other medications without consulting your doctor     Pain Management: per above medications    What to do at Home    Recommended diet:  Cardiac Diet    Recommended activity: Activity as tolerated    1) Return to the hospital if you feel worse    2) If you experience any of the following symptoms then please call your primary care physician or return to the emergency room if you cannot get hold of your doctor:  Fever, chills, nausea, vomiting, diarrhea, change in mentation, falling, bleeding, shortness of breath, chest pain, severe headache, severe abdominal pain. Follow Up: Follow-up Information     Follow up With Specialties Details Why Contact Info    Chepe Menendez MD Cardiology On 4/28/2021 1:40 pm  [address] [address] [phone]         Please follow up with a primary care doctor within 3 days. José Bernardo MD Gastroenterology  As needed [address]  [address]  [phone]      Other, [facility]    Patient can only remember the practice name and not the physician              Information obtained by :  I understand that if any problems occur once I am at home I am to contact my physician. I understand and acknowledge receipt of the instructions indicated above.                                                                                                                                            Physician's or R.N.'s Signature                                                                  Date/Time                                                                                                                                              Patient or Representative Signature                                                          Date/Time

## 2021-04-25 NOTE — PROGRESS NOTES
Report given to oncoming nurse at [facility] [place] JULIA

## 2021-04-25 NOTE — DISCHARGE SUMMARY
Juventino Cano [facility] 79  [address]  [phone]    Physician Discharge Summary     Patient ID:  Chidi Sorensen  [id number]  87 y.o.  5/27/1930    Admit date: 4/18/2021    Discharge date and time: 4/25/2021 12:53 PM    Admission Diagnoses: Coffee ground emesis [K92.0]    Discharge Diagnoses:  Principal Diagnosis Acute GI bleeding                                            Principal Problem:    Acute GI bleeding (4/18/2021)    Active Problems:    Coffee ground emesis (4/18/2021)      HTN (hypertension), benign (4/18/2021)      Hypothyroidism (4/18/2021)      Arthritis (4/18/2021)      Underweight (4/18/2021)         Hospital Course:     Encephalopathy   -resolved  - back to baseline dementia   - likely due to rhinovirus, S/p IVF and supportive care  - ct head wo unremarkable, abg with w/o hypercapnia, TSH wnl, troponin unremarkable   - held off on neurology evaluation as patient back to baseline     Febrile  -resolved  -likely due to rhinovirus   - RVP positive for rhinovirus   -Chest x-ray w/o infiltrates, repeat UA unremarkable for infection - completed abx course,  venous Dopplers neg  -tylenol PRN      Acute GI bleeding (4/18/2021) / Coffee ground emesis (4/18/2021) POA:   -likely due to NSAIDs induced PUD  -s/p transfusion 4/18, Hgb has been stable   -GI evaluated, held off on EGD bc son wanted to take a conservative approach   - c/w PPI    - F/u Op as needed     Arrhythmia/afib  -concern for 2nd or 3rd degree heart block  -no AC for afib   -cardiology evaluted   -reevaluated 4/24 w/o any evidence of sign pauses   - 30 day OP monitor      HTN (hypertension), benign (4/18/2021) POA:   -stopped amlodipine, started on low dose PO hydrazine   -liberal BP control due to age      Hypothyroidism (4/18/2021) POA   -TSH normal.   -cont LT4     Anemia POA: unclear baseline.   -Likely due to blood loss.   -monitor OP     Arthritis (4/18/2021) POA: Tylenol PRN     Underweight (4/18/2021) POA: likely from advanced age, poor intake vs other. Diet as tolerated       PCP: Other, [facility]     Consults: Cardiology and GI    Significant Diagnostic Studies:     LE dopplers   Bilateral lower extremity venous duplex negative for deep venous thrombosis or thrombophlebitis, however due to limited visualization of right femoral and calf veins, isolated vein thrombus cannot be excluded. Discharge Exam:  Physical Exam:    Gen:  frail, in no acute distress  HEENT:  Pink conjunctivae, PERRL  Resp:  No accessory muscle use, clear breath sounds without wheezes rales or rhonchi  Card:  RRR, No murmurs, normal S1, S2, b/l peripheral edema  Abd:  Soft, non-tender, non-distended, normoactive bowel sounds are present  Musc:  No cyanosis or clubbing, lower extremity tenderness to palpation  Skin:  skin turgor is good  Neuro:  awake, alert, follows some commands   Psych:  Poor insight       Disposition: assisted living   Discharge Condition: Stable    Patient Instructions:   Current Discharge Medication List      START taking these medications    Details   hydrALAZINE (APRESOLINE) 10 mg tablet Take 0.5 Tabs by mouth two (2) times a day. Qty: 90 Tab, Refills: 0      pantoprazole (PROTONIX) 40 mg tablet Take 1 Tab by mouth Before breakfast and dinner. Qty: 60 Tab, Refills: 0      acetaminophen (TylenoL) 325 mg tablet Take 2 Tabs by mouth every six (6) hours as needed for Fever. Qty: 30 Tab, Refills: 0         CONTINUE these medications which have CHANGED    Details   levothyroxine (SYNTHROID) 50 mcg tablet Take 1 Tab by mouth Daily (before breakfast).   Qty: 30 Tab, Refills: 0         STOP taking these medications       amLODIPine (NORVASC) 5 mg tablet Comments:   Reason for Stopping:         aspirin delayed-release 81 mg tablet Comments:   Reason for Stopping:         diclofenac EC (VOLTAREN) 50 mg EC tablet Comments:   Reason for Stopping:             Activity: Activity as tolerated  Diet: Cardiac Diet  Wound Care: None needed    Follow-up with  Follow-up Information     Follow up With Specialties Details Why Contact Info    Brook Butler MD Cardiology On 4/28/2021 1:40 pm  [address] [place], [place] [address] [phone]         Please follow up with a primary care doctor within 3 days. Wendy Saenz MD Gastroenterology  As needed [address] [facility] [address]  [address]  [phone]      Other, [facility]    Patient can only remember the practice name and not the physician            Follow-up tests/labs as above.      Signed:  Larisa Phoenix DO  4/25/2021  12:53 PM  **I personally spent 35 min on discharge**

## 2021-04-25 NOTE — PROGRESS NOTES
CMS Note  4/25/2021    Due to patient's medical status, the second IMM letter was dicussed with patient's son. Patient's son was provided with a copy for their record.   Rodriguez Solitario CMS

## 2021-04-26 ENCOUNTER — HOSPITAL ENCOUNTER (EMERGENCY)
Age: 86
Discharge: HOME | End: 2021-04-26
Attending: EMERGENCY MEDICINE
Payer: MEDICARE

## 2021-04-26 VITALS
HEART RATE: 84 BPM | RESPIRATION RATE: 21 BRPM | TEMPERATURE: 97.4 F | OXYGEN SATURATION: 98 % | DIASTOLIC BLOOD PRESSURE: 78 MMHG | SYSTOLIC BLOOD PRESSURE: 121 MMHG

## 2021-04-26 DIAGNOSIS — K92.2 GASTROINTESTINAL HEMORRHAGE, UNSPECIFIED GASTROINTESTINAL HEMORRHAGE TYPE: Primary | ICD-10-CM

## 2021-04-26 LAB
ALBUMIN SERPL-MCNC: 2.1 G/DL (ref 3.5–5)
ALBUMIN/GLOB SERPL: 0.7 {RATIO} (ref 1.1–2.2)
ALP SERPL-CCNC: 93 U/L (ref 45–117)
ALT SERPL-CCNC: 15 U/L (ref 12–78)
ANION GAP SERPL CALC-SCNC: 8 MMOL/L (ref 5–15)
APTT PPP: 23.1 SEC (ref 22.1–31)
AST SERPL-CCNC: 18 U/L (ref 15–37)
BASOPHILS # BLD: 0 K/UL (ref 0–0.1)
BASOPHILS # BLD: 0 K/UL (ref 0–0.1)
BASOPHILS NFR BLD: 0 % (ref 0–1)
BASOPHILS NFR BLD: 1 % (ref 0–1)
BILIRUB SERPL-MCNC: 0.2 MG/DL (ref 0.2–1)
BUN SERPL-MCNC: 9 MG/DL (ref 6–20)
BUN/CREAT SERPL: 11 (ref 12–20)
CALCIUM SERPL-MCNC: 8.2 MG/DL (ref 8.5–10.1)
CHLORIDE SERPL-SCNC: 101 MMOL/L (ref 97–108)
CO2 SERPL-SCNC: 26 MMOL/L (ref 21–32)
CREAT SERPL-MCNC: 0.85 MG/DL (ref 0.55–1.02)
DIFFERENTIAL METHOD BLD: ABNORMAL
DIFFERENTIAL METHOD BLD: ABNORMAL
EOSINOPHIL # BLD: 0.1 K/UL (ref 0–0.4)
EOSINOPHIL # BLD: 0.1 K/UL (ref 0–0.4)
EOSINOPHIL NFR BLD: 1 % (ref 0–7)
EOSINOPHIL NFR BLD: 1 % (ref 0–7)
ERYTHROCYTE [DISTWIDTH] IN BLOOD BY AUTOMATED COUNT: 14.6 % (ref 11.5–14.5)
ERYTHROCYTE [DISTWIDTH] IN BLOOD BY AUTOMATED COUNT: 14.7 % (ref 11.5–14.5)
GLOBULIN SER CALC-MCNC: 3.1 G/DL (ref 2–4)
GLUCOSE SERPL-MCNC: 119 MG/DL (ref 65–100)
HCT VFR BLD AUTO: 32.5 % (ref 35–47)
HCT VFR BLD AUTO: 35.8 % (ref 35–47)
HEMOCCULT STL QL: POSITIVE
HGB BLD-MCNC: 10.4 G/DL (ref 11.5–16)
HGB BLD-MCNC: 11.5 G/DL (ref 11.5–16)
IMM GRANULOCYTES # BLD AUTO: 0 K/UL (ref 0–0.04)
IMM GRANULOCYTES # BLD AUTO: 0 K/UL (ref 0–0.04)
IMM GRANULOCYTES NFR BLD AUTO: 0 % (ref 0–0.5)
IMM GRANULOCYTES NFR BLD AUTO: 0 % (ref 0–0.5)
INR PPP: 1.1 (ref 0.9–1.1)
LYMPHOCYTES # BLD: 1.3 K/UL (ref 0.8–3.5)
LYMPHOCYTES # BLD: 1.3 K/UL (ref 0.8–3.5)
LYMPHOCYTES NFR BLD: 17 % (ref 12–49)
LYMPHOCYTES NFR BLD: 18 % (ref 12–49)
MCH RBC QN AUTO: 28.7 PG (ref 26–34)
MCH RBC QN AUTO: 28.8 PG (ref 26–34)
MCHC RBC AUTO-ENTMCNC: 32 G/DL (ref 30–36.5)
MCHC RBC AUTO-ENTMCNC: 32.1 G/DL (ref 30–36.5)
MCV RBC AUTO: 89.7 FL (ref 80–99)
MCV RBC AUTO: 89.8 FL (ref 80–99)
MONOCYTES # BLD: 0.4 K/UL (ref 0–1)
MONOCYTES # BLD: 0.5 K/UL (ref 0–1)
MONOCYTES NFR BLD: 6 % (ref 5–13)
MONOCYTES NFR BLD: 6 % (ref 5–13)
NEUTS SEG # BLD: 5.3 K/UL (ref 1.8–8)
NEUTS SEG # BLD: 5.8 K/UL (ref 1.8–8)
NEUTS SEG NFR BLD: 74 % (ref 32–75)
NEUTS SEG NFR BLD: 76 % (ref 32–75)
NRBC # BLD: 0 K/UL (ref 0–0.01)
NRBC # BLD: 0 K/UL (ref 0–0.01)
NRBC BLD-RTO: 0 PER 100 WBC
NRBC BLD-RTO: 0 PER 100 WBC
PLATELET # BLD AUTO: 300 K/UL (ref 150–400)
PLATELET # BLD AUTO: 323 K/UL (ref 150–400)
PMV BLD AUTO: 9.1 FL (ref 8.9–12.9)
PMV BLD AUTO: 9.2 FL (ref 8.9–12.9)
POTASSIUM SERPL-SCNC: 4 MMOL/L (ref 3.5–5.1)
PROT SERPL-MCNC: 5.2 G/DL (ref 6.4–8.2)
PROTHROMBIN TIME: 11 SEC (ref 9–11.1)
RBC # BLD AUTO: 3.62 M/UL (ref 3.8–5.2)
RBC # BLD AUTO: 3.99 M/UL (ref 3.8–5.2)
SODIUM SERPL-SCNC: 135 MMOL/L (ref 136–145)
THERAPEUTIC RANGE,PTTT: NORMAL SECS (ref 58–77)
WBC # BLD AUTO: 7.1 K/UL (ref 3.6–11)
WBC # BLD AUTO: 7.7 K/UL (ref 3.6–11)

## 2021-04-26 PROCEDURE — 85610 PROTHROMBIN TIME: CPT

## 2021-04-26 PROCEDURE — 99284 EMERGENCY DEPT VISIT MOD MDM: CPT

## 2021-04-26 PROCEDURE — 85025 COMPLETE CBC W/AUTO DIFF WBC: CPT

## 2021-04-26 PROCEDURE — 36415 COLL VENOUS BLD VENIPUNCTURE: CPT

## 2021-04-26 PROCEDURE — 85730 THROMBOPLASTIN TIME PARTIAL: CPT

## 2021-04-26 PROCEDURE — 80053 COMPREHEN METABOLIC PANEL: CPT

## 2021-04-26 PROCEDURE — 82272 OCCULT BLD FECES 1-3 TESTS: CPT

## 2021-04-26 NOTE — ED PROVIDER NOTES
HPI patient was admitted to [address] [place] [address] from 4/18 through 25 for confusion, GI bleed and fever. She was discharged to nursing home yesterday and today has had several episodes of dark tarry diarrhea. Patient also complains of generalized weakness. There is no history of nausea/vomiting. The patient has dementia and is normally O x1 which she is at the present time.     Past Medical History:   Diagnosis Date    Arthritis     Hypertension     Hypothyroidism        Past Surgical History:   Procedure Laterality Date    HX CHOLECYSTECTOMY      HX GYN      partial hysterectomy, uterine prolapse    HX HEENT      cataract    HX ORTHOPAEDIC Right     knee replacement         Family History:   Problem Relation Age of Onset    Heart Disease Neg Hx        Social History     Socioeconomic History    Marital status:      Spouse name: Not on file    Number of children: Not on file    Years of education: Not on file    Highest education level: Not on file   Occupational History    Not on file   Social Needs    Financial resource strain: Not on file    Food insecurity     Worry: Not on file     Inability: Not on file    Transportation needs     Medical: Not on file     Non-medical: Not on file   Tobacco Use    Smoking status: Never Smoker   Substance and Sexual Activity    Alcohol use: Not Currently    Drug use: Not on file    Sexual activity: Not on file   Lifestyle    Physical activity     Days per week: Not on file     Minutes per session: Not on file    Stress: Not on file   Relationships    Social connections     Talks on phone: Not on file     Gets together: Not on file     Attends Sabianist service: Not on file     Active member of club or organization: Not on file     Attends meetings of clubs or organizations: Not on file     Relationship status: Not on file    Intimate partner violence     Fear of current or ex partner: Not on file     Emotionally abused: Not on file     Physically abused: Not on file     Forced sexual activity: Not on file   Other Topics Concern    Not on file   Social History Narrative    Not on file         ALLERGIES: Pcn [penicillins], Statins-hmg-coa reductase inhibitors, and Warfarin    Review of Systems   Unable to perform ROS: Dementia       There were no vitals filed for this visit. Physical Exam  Constitutional:       General: She is not in acute distress. Appearance: She is well-developed. HENT:      Head: Normocephalic. Mouth/Throat:      Comments: Pale mucous membranes  Eyes:      Pupils: Pupils are equal, round, and reactive to light. Neck:      Musculoskeletal: Normal range of motion. Cardiovascular:      Rate and Rhythm: Normal rate. Heart sounds: No murmur. Pulmonary:      Effort: Pulmonary effort is normal.      Breath sounds: Normal breath sounds. Abdominal:      Palpations: Abdomen is soft. Tenderness: There is no abdominal tenderness. Genitourinary:     Comments: Patient is incontinent of liquid stool which is black in color  Musculoskeletal: Normal range of motion. Skin:     General: Skin is warm and dry. Capillary Refill: Capillary refill takes less than 2 seconds. Coloration: Skin is pale. Neurological:      Mental Status: She is alert. Psychiatric:         Behavior: Behavior normal.          MDM       Procedures    Hemoglobin is 10.5 here which is higher than when she was discharged yesterday. She is not actively passing stool at this point and her vital signs have been normal.  The plan is to discharge the patient back to the nursing home.

## 2021-04-26 NOTE — ED TRIAGE NOTES
From [facility]. Pt was at [facility] for GI bleed recently. Pt has been having diarrhea/tarry stools over night and all day today.

## 2021-04-26 NOTE — ED NOTES
Report given to [facility] regarding patient condition. Patient stable at time of transport.

## 2021-04-26 NOTE — ED NOTES
Patient cleansed of small loose stool. Patient tolerated well. 1cm diameter stage 2 pressure area noted to the sacral area. Area cleansed and zinc oxide applied to wound. Patient dressed and readied for discharge.

## 2021-04-26 NOTE — ED NOTES
Additional blood sample obtained from existing IV for additional testing. Patient tolerated well. Patient pleasantly confused. Reoriented. Resting on stretcher in no distress at this time.

## 2021-04-26 NOTE — ED NOTES
Report given to [address] at [facility]. No questions expressed at this time. [address] voices understanding of patient condition. Awaiting amr.

## 2021-04-29 NOTE — PROGRESS NOTES
Physician Progress Note      PATIENT:               Rachel Silva  CSN #:                  [id number]  :                       1930  ADMIT DATE:       2021 10:43 AM  DISCH DATE:        2021 3:15 PM  RESPONDING  PROVIDER #:        Bridget Gardner DO          QUERY TEXT:    Dear Dr. [place] Gist,  Pt admitted with GI bleeding and has encephalopathy documented. If possible, please document in progress notes and discharge summary further specificity regarding the type of encephalopathy:    The medical record reflects the following:  Risk Factors: 80 yr. old female admitted with GI bleeding thought to be related to peptic ulcer. Clinical Indicators: Progress notes stated, \"Encephalopathy - at baseline confused but currently worse then baseline with somnolence - likely due to rhinovirus. \" \"Somnolent again today, but slightly improved. Unable to obtain reliable ROS due to dementia, at baseline confused but not somnolent. \" \"Febrile-likely due to rhinovirus. \" Encephalopathy -resolved- back to baseline dementia. \"  Treatment: Lab work, CT head, chest x-ray and UA, IV fluids, supportive care  Options provided:  -- Metabolic encephalopathy  -- Toxic encephalopathy  -- Toxic metabolic encephalopathy  -- Other - I will add my own diagnosis  -- Disagree - Not applicable / Not valid  -- Disagree - Clinically unable to determine / Unknown  -- Refer to Clinical Documentation Reviewer    PROVIDER RESPONSE TEXT:    Infectious encephalopathy    Query created by: Neal Copeland on 2021 12:14 PM      Electronically signed by:  Bridget Gardner DO 2021 3:04 PM

## 2021-06-01 ENCOUNTER — OFFICE VISIT (OUTPATIENT)
Dept: CARDIOLOGY CLINIC | Age: 86
End: 2021-06-01
Payer: MEDICARE

## 2021-06-01 VITALS
HEART RATE: 86 BPM | OXYGEN SATURATION: 90 % | BODY MASS INDEX: 17.94 KG/M2 | DIASTOLIC BLOOD PRESSURE: 76 MMHG | SYSTOLIC BLOOD PRESSURE: 110 MMHG | HEIGHT: 56 IN

## 2021-06-01 DIAGNOSIS — I44.1 AV BLOCK, 2ND DEGREE: Primary | ICD-10-CM

## 2021-06-01 PROCEDURE — 1090F PRES/ABSN URINE INCON ASSESS: CPT | Performed by: INTERNAL MEDICINE

## 2021-06-01 PROCEDURE — G8427 DOCREV CUR MEDS BY ELIG CLIN: HCPCS | Performed by: INTERNAL MEDICINE

## 2021-06-01 PROCEDURE — G8536 NO DOC ELDER MAL SCRN: HCPCS | Performed by: INTERNAL MEDICINE

## 2021-06-01 PROCEDURE — G8432 DEP SCR NOT DOC, RNG: HCPCS | Performed by: INTERNAL MEDICINE

## 2021-06-01 PROCEDURE — 1101F PT FALLS ASSESS-DOCD LE1/YR: CPT | Performed by: INTERNAL MEDICINE

## 2021-06-01 PROCEDURE — 99213 OFFICE O/P EST LOW 20 MIN: CPT | Performed by: INTERNAL MEDICINE

## 2021-06-01 PROCEDURE — G8419 CALC BMI OUT NRM PARAM NOF/U: HCPCS | Performed by: INTERNAL MEDICINE

## 2021-06-01 NOTE — PROGRESS NOTES
Erick Castaneda MD., [facility] [place]    [address] [place], [address] [facility]    Office [phone],[facility] [phone]           Marimar Ramirez is here for a f/u office visit. Primary care physician:  Yoana Anderson DO    CC - as documented in EMR    Dear Dr. Yoana Anderson DO    I had the pleasure of seeing Mr.Betty Nick Orozco in the office today. Assessment:     [facility] Admt 4/18 - 4/25/21 - GI bleed/2nd deg type 1; transient 3 rd deg AV block (2.9 sec) on tele  Arrhythmia -  2nd deg type 1; transient 3 rd deg AV block (2.9 sec) on tele   HTN  Hypothyroidism      Plan:      Holter-transient second-degree type I-heart rate of 71 bpm  Not on AV node blocking agents  No syncope/dizziness  D/w son - no invasive procedures secondary to advanced age - however he might consider PPM if pt needs it. F/U 4 months/earlier prn    I appreciate the opportunity to be involved in Ms. Zambrano. See note below for details. Please do not hesitate to contact us with questions or concerns. Erick Castaneda MD      Cardiac Testing/ Procedures: A. Cardiac Cath/PCI:    B.ECHO/AZIZA: 4/20/21 LV: Estimated LVEF is 60 - 65%. Mild concentric hypertrophy. Mild (grade 1) left ventricular diastolic dysfunction. · AV: Allison Profit Mild aortic valve regurgitation is present. · MV: Allison Profit Mild mitral valve regurgitation is present. TV: Mild to moderate tricuspid valve regurgitation is present. C.StressNuclear/Stress ECHO/Stress test:    D.Vascular:    E. EP: 4/23/21 50 -120bpm   6 events were transmitted. 0 symptomatic; 6 device triggered   Patient monitored for 4d 23h 31m   Heart Block occurred 4 time(s) the most severe 2° type 1; slowest 71 BPM   Unable to calculate PACs due to excessive artifact   Unable to calculate PVCs due to excessive artifact    F. Miscellaneous:    History:     Marimar Ramirez is a 80 y.o. female who returns for follow up visit. Pt is hard of hearing. Confused. Accompanied by his son. Lives in [place]    ROS:  (bold if positive, if negative)           Medications:       Current Outpatient Medications   Medication Sig Dispense    hydrALAZINE (APRESOLINE) 10 mg tablet Take 0.5 Tabs by mouth two (2) times a day. 90 Tab    pantoprazole (PROTONIX) 40 mg tablet Take 1 Tab by mouth Before breakfast and dinner. 60 Tab    levothyroxine (SYNTHROID) 50 mcg tablet Take 1 Tab by mouth Daily (before breakfast). 30 Tab    acetaminophen (TylenoL) 325 mg tablet Take 2 Tabs by mouth every six (6) hours as needed for Fever. 30 Tab     No current facility-administered medications for this visit. Family History of CAD:    Yes/No    Social History:  Current  Smoker  Yes/No    Physical Exam:     Visit Vitals  /76 (BP 1 Location: Left upper arm, BP Patient Position: Sitting)   Pulse 86   Ht 4' 8\" (1.422 m)   SpO2 90%   BMI 17.94 kg/m²          Gen: Frail, in no acute distress, elderly  Neck: Supple,No JVD,    Resp: No accessory muscle use, Clear breath sounds, No rales or rhonchi  Card: Regular Rate,Rythm,Normal S1, S2, 2/6 sys murmur+,  Abd:  Soft, ,BS+,   MSK: No cyanosis  Skin: No rashes    Neuro: moving all four extremities , follows commands appropriately  Psych:  Good insight, oriented to person, place , alert, Nml Affect  LE: No edema    EKG:       Medication Side Effects and Warnings were discussed with patient: yes  Patient Labs were reviewed and/or requested:  yes  Patient Past Records were reviewed and/or requested: yes    Total time :        mins    ATTENTION:   This medical record was transcribed using an electronic medical records/speech recognition system. Although proofread, it may and can contain electronic, spelling and other errors. Corrections may be executed at a later time. Please feel free to contact us for any clarifications as needed.       Jennie Ambrose MD

## 2021-06-01 NOTE — PROGRESS NOTES
Sandie Ugarte is a 80 y.o. female    Chief Complaint   Patient presents with   [facility] Follow Up     heart block        Chest pain No    SOB No    Dizziness No    Swelling No    Refills No    Visit Vitals  /76 (BP 1 Location: Left upper arm, BP Patient Position: Sitting)   Pulse 86   Ht 4' 8\" (1.422 m)   SpO2 90%   BMI 17.94 kg/m²       1. Have you been to the ER, urgent care clinic since your last visit? Hospitalized since your last visit? ED 4/18- 4/25 & 4/26    2. Have you seen or consulted any other health care providers outside of the [address] [place] [address] since your last visit? Include any pap smears or colon screening.   no

## 2021-06-22 ENCOUNTER — APPOINTMENT (OUTPATIENT)
Dept: GENERAL RADIOLOGY | Age: 86
DRG: 480 | End: 2021-06-22
Attending: ORTHOPAEDIC SURGERY
Payer: MEDICARE

## 2021-06-22 ENCOUNTER — HOSPITAL ENCOUNTER (INPATIENT)
Age: 86
LOS: 3 days | Discharge: HOSPICE | DRG: 480 | End: 2021-06-25
Attending: EMERGENCY MEDICINE | Admitting: FAMILY MEDICINE
Payer: MEDICARE

## 2021-06-22 ENCOUNTER — APPOINTMENT (OUTPATIENT)
Dept: CT IMAGING | Age: 86
DRG: 480 | End: 2021-06-22
Attending: EMERGENCY MEDICINE
Payer: MEDICARE

## 2021-06-22 ENCOUNTER — ANESTHESIA (OUTPATIENT)
Dept: SURGERY | Age: 86
DRG: 480 | End: 2021-06-22
Payer: MEDICARE

## 2021-06-22 ENCOUNTER — APPOINTMENT (OUTPATIENT)
Dept: GENERAL RADIOLOGY | Age: 86
DRG: 480 | End: 2021-06-22
Attending: EMERGENCY MEDICINE
Payer: MEDICARE

## 2021-06-22 ENCOUNTER — ANESTHESIA EVENT (OUTPATIENT)
Dept: SURGERY | Age: 86
DRG: 480 | End: 2021-06-22
Payer: MEDICARE

## 2021-06-22 DIAGNOSIS — G30.1 LATE ONSET ALZHEIMER'S DEMENTIA WITHOUT BEHAVIORAL DISTURBANCE (HCC): ICD-10-CM

## 2021-06-22 DIAGNOSIS — R63.6 UNDERWEIGHT: ICD-10-CM

## 2021-06-22 DIAGNOSIS — F02.80 LATE ONSET ALZHEIMER'S DEMENTIA WITHOUT BEHAVIORAL DISTURBANCE (HCC): ICD-10-CM

## 2021-06-22 DIAGNOSIS — R53.81 DEBILITY: ICD-10-CM

## 2021-06-22 DIAGNOSIS — S72.351A CLOSED DISPLACED COMMINUTED FRACTURE OF SHAFT OF RIGHT FEMUR, INITIAL ENCOUNTER (HCC): Primary | ICD-10-CM

## 2021-06-22 DIAGNOSIS — S72.8X1A OTHER CLOSED FRACTURE OF RIGHT FEMUR, UNSPECIFIED PORTION OF FEMUR, INITIAL ENCOUNTER (HCC): ICD-10-CM

## 2021-06-22 DIAGNOSIS — Z71.89 COUNSELING REGARDING ADVANCE CARE PLANNING AND GOALS OF CARE: ICD-10-CM

## 2021-06-22 PROBLEM — S72.90XA FEMUR FRACTURE (HCC): Status: ACTIVE | Noted: 2021-06-22

## 2021-06-22 LAB
ALBUMIN SERPL-MCNC: 2.1 G/DL (ref 3.5–5)
ALBUMIN/GLOB SERPL: 0.7 {RATIO} (ref 1.1–2.2)
ALP SERPL-CCNC: 89 U/L (ref 45–117)
ALT SERPL-CCNC: 19 U/L (ref 12–78)
ANION GAP SERPL CALC-SCNC: 4 MMOL/L (ref 5–15)
ANION GAP SERPL CALC-SCNC: 5 MMOL/L (ref 5–15)
AST SERPL-CCNC: 28 U/L (ref 15–37)
ATRIAL RATE: 127 BPM
BASOPHILS # BLD: 0 K/UL (ref 0–0.1)
BASOPHILS # BLD: 0.1 K/UL (ref 0–0.1)
BASOPHILS NFR BLD: 0 % (ref 0–1)
BASOPHILS NFR BLD: 1 % (ref 0–1)
BILIRUB SERPL-MCNC: 0.3 MG/DL (ref 0.2–1)
BUN SERPL-MCNC: 16 MG/DL (ref 6–20)
BUN SERPL-MCNC: 16 MG/DL (ref 6–20)
BUN/CREAT SERPL: 25 (ref 12–20)
BUN/CREAT SERPL: 40 (ref 12–20)
CALCIUM SERPL-MCNC: 6.9 MG/DL (ref 8.5–10.1)
CALCIUM SERPL-MCNC: 7.5 MG/DL (ref 8.5–10.1)
CALCULATED R AXIS, ECG10: -77 DEGREES
CALCULATED T AXIS, ECG11: -3 DEGREES
CHLORIDE SERPL-SCNC: 104 MMOL/L (ref 97–108)
CHLORIDE SERPL-SCNC: 109 MMOL/L (ref 97–108)
CO2 SERPL-SCNC: 27 MMOL/L (ref 21–32)
CO2 SERPL-SCNC: 28 MMOL/L (ref 21–32)
COMMENT, HOLDF: NORMAL
COVID-19 RAPID TEST, COVR: NOT DETECTED
CREAT SERPL-MCNC: 0.4 MG/DL (ref 0.55–1.02)
CREAT SERPL-MCNC: 0.65 MG/DL (ref 0.55–1.02)
DIAGNOSIS, 93000: NORMAL
DIFFERENTIAL METHOD BLD: ABNORMAL
DIFFERENTIAL METHOD BLD: ABNORMAL
EOSINOPHIL # BLD: 0 K/UL (ref 0–0.4)
EOSINOPHIL # BLD: 0.2 K/UL (ref 0–0.4)
EOSINOPHIL NFR BLD: 0 % (ref 0–7)
EOSINOPHIL NFR BLD: 2 % (ref 0–7)
ERYTHROCYTE [DISTWIDTH] IN BLOOD BY AUTOMATED COUNT: 15.8 % (ref 11.5–14.5)
ERYTHROCYTE [DISTWIDTH] IN BLOOD BY AUTOMATED COUNT: 16 % (ref 11.5–14.5)
GLOBULIN SER CALC-MCNC: 3.1 G/DL (ref 2–4)
GLUCOSE BLD STRIP.AUTO-MCNC: 125 MG/DL (ref 65–117)
GLUCOSE SERPL-MCNC: 112 MG/DL (ref 65–100)
GLUCOSE SERPL-MCNC: 127 MG/DL (ref 65–100)
HCT VFR BLD AUTO: 20.4 % (ref 35–47)
HCT VFR BLD AUTO: 32.7 % (ref 35–47)
HGB BLD-MCNC: 10.4 G/DL (ref 11.5–16)
HGB BLD-MCNC: 5.6 G/DL (ref 11.5–16)
HGB BLD-MCNC: 5.9 G/DL (ref 11.5–16)
HGB BLD-MCNC: 6.3 G/DL (ref 11.5–16)
HISTORY CHECKED?,CKHIST: NORMAL
IMM GRANULOCYTES # BLD AUTO: 0 K/UL (ref 0–0.04)
IMM GRANULOCYTES # BLD AUTO: 0.1 K/UL (ref 0–0.04)
IMM GRANULOCYTES NFR BLD AUTO: 0 % (ref 0–0.5)
IMM GRANULOCYTES NFR BLD AUTO: 1 % (ref 0–0.5)
LYMPHOCYTES # BLD: 1.6 K/UL (ref 0.8–3.5)
LYMPHOCYTES # BLD: 2.6 K/UL (ref 0.8–3.5)
LYMPHOCYTES NFR BLD: 16 % (ref 12–49)
LYMPHOCYTES NFR BLD: 25 % (ref 12–49)
MCH RBC QN AUTO: 28.5 PG (ref 26–34)
MCH RBC QN AUTO: 28.5 PG (ref 26–34)
MCHC RBC AUTO-ENTMCNC: 30.9 G/DL (ref 30–36.5)
MCHC RBC AUTO-ENTMCNC: 31.8 G/DL (ref 30–36.5)
MCV RBC AUTO: 89.6 FL (ref 80–99)
MCV RBC AUTO: 92.3 FL (ref 80–99)
MONOCYTES # BLD: 0.6 K/UL (ref 0–1)
MONOCYTES # BLD: 0.7 K/UL (ref 0–1)
MONOCYTES NFR BLD: 6 % (ref 5–13)
MONOCYTES NFR BLD: 6 % (ref 5–13)
NEUTS SEG # BLD: 6.8 K/UL (ref 1.8–8)
NEUTS SEG # BLD: 7.5 K/UL (ref 1.8–8)
NEUTS SEG NFR BLD: 66 % (ref 32–75)
NEUTS SEG NFR BLD: 77 % (ref 32–75)
NRBC # BLD: 0 K/UL (ref 0–0.01)
NRBC # BLD: 0 K/UL (ref 0–0.01)
NRBC BLD-RTO: 0 PER 100 WBC
NRBC BLD-RTO: 0 PER 100 WBC
PLATELET # BLD AUTO: 204 K/UL (ref 150–400)
PLATELET # BLD AUTO: 369 K/UL (ref 150–400)
PMV BLD AUTO: 9.1 FL (ref 8.9–12.9)
PMV BLD AUTO: 9.1 FL (ref 8.9–12.9)
POTASSIUM SERPL-SCNC: 4.3 MMOL/L (ref 3.5–5.1)
POTASSIUM SERPL-SCNC: 4.9 MMOL/L (ref 3.5–5.1)
PROT SERPL-MCNC: 5.2 G/DL (ref 6.4–8.2)
Q-T INTERVAL, ECG07: 336 MS
QRS DURATION, ECG06: 104 MS
QTC CALCULATION (BEZET), ECG08: 450 MS
RBC # BLD AUTO: 2.21 M/UL (ref 3.8–5.2)
RBC # BLD AUTO: 3.65 M/UL (ref 3.8–5.2)
RBC MORPH BLD: ABNORMAL
SAMPLES BEING HELD,HOLD: NORMAL
SERVICE CMNT-IMP: ABNORMAL
SODIUM SERPL-SCNC: 137 MMOL/L (ref 136–145)
SODIUM SERPL-SCNC: 140 MMOL/L (ref 136–145)
SOURCE, COVRS: NORMAL
VENTRICULAR RATE, ECG03: 108 BPM
WBC # BLD AUTO: 10.4 K/UL (ref 3.6–11)
WBC # BLD AUTO: 9.8 K/UL (ref 3.6–11)

## 2021-06-22 PROCEDURE — 73700 CT LOWER EXTREMITY W/O DYE: CPT

## 2021-06-22 PROCEDURE — 96374 THER/PROPH/DIAG INJ IV PUSH: CPT

## 2021-06-22 PROCEDURE — 72170 X-RAY EXAM OF PELVIS: CPT

## 2021-06-22 PROCEDURE — 74011250636 HC RX REV CODE- 250/636: Performed by: PHYSICIAN ASSISTANT

## 2021-06-22 PROCEDURE — 74011000258 HC RX REV CODE- 258: Performed by: NURSE ANESTHETIST, CERTIFIED REGISTERED

## 2021-06-22 PROCEDURE — 65270000029 HC RM PRIVATE

## 2021-06-22 PROCEDURE — 77030041075 HC DRSG AG OPTIFRM MDII -B: Performed by: ORTHOPAEDIC SURGERY

## 2021-06-22 PROCEDURE — 99285 EMERGENCY DEPT VISIT HI MDM: CPT

## 2021-06-22 PROCEDURE — 85018 HEMOGLOBIN: CPT

## 2021-06-22 PROCEDURE — P9016 RBC LEUKOCYTES REDUCED: HCPCS

## 2021-06-22 PROCEDURE — 85025 COMPLETE CBC W/AUTO DIFF WBC: CPT

## 2021-06-22 PROCEDURE — 73560 X-RAY EXAM OF KNEE 1 OR 2: CPT

## 2021-06-22 PROCEDURE — 86901 BLOOD TYPING SEROLOGIC RH(D): CPT

## 2021-06-22 PROCEDURE — C1769 GUIDE WIRE: HCPCS | Performed by: ORTHOPAEDIC SURGERY

## 2021-06-22 PROCEDURE — 74011000250 HC RX REV CODE- 250: Performed by: EMERGENCY MEDICINE

## 2021-06-22 PROCEDURE — 77030016453 HC BIT DRL CALIB1 ZIMM -C: Performed by: ORTHOPAEDIC SURGERY

## 2021-06-22 PROCEDURE — 77030028907 HC WRP KNEE WO BGS SOLM -B

## 2021-06-22 PROCEDURE — 87635 SARS-COV-2 COVID-19 AMP PRB: CPT

## 2021-06-22 PROCEDURE — 77030008462 HC STPLR SKN PROX J&J -A: Performed by: ORTHOPAEDIC SURGERY

## 2021-06-22 PROCEDURE — 74011250636 HC RX REV CODE- 250/636: Performed by: ANESTHESIOLOGY

## 2021-06-22 PROCEDURE — 77030022928: Performed by: ORTHOPAEDIC SURGERY

## 2021-06-22 PROCEDURE — 73552 X-RAY EXAM OF FEMUR 2/>: CPT

## 2021-06-22 PROCEDURE — 74011250636 HC RX REV CODE- 250/636: Performed by: NURSE ANESTHETIST, CERTIFIED REGISTERED

## 2021-06-22 PROCEDURE — 93005 ELECTROCARDIOGRAM TRACING: CPT

## 2021-06-22 PROCEDURE — C1713 ANCHOR/SCREW BN/BN,TIS/BN: HCPCS | Performed by: ORTHOPAEDIC SURGERY

## 2021-06-22 PROCEDURE — 86923 COMPATIBILITY TEST ELECTRIC: CPT

## 2021-06-22 PROCEDURE — 77030013708 HC HNDPC SUC IRR PULS STRY –B: Performed by: ORTHOPAEDIC SURGERY

## 2021-06-22 PROCEDURE — 2709999900 HC NON-CHARGEABLE SUPPLY: Performed by: ORTHOPAEDIC SURGERY

## 2021-06-22 PROCEDURE — 77030031139 HC SUT VCRL2 J&J -A: Performed by: ORTHOPAEDIC SURGERY

## 2021-06-22 PROCEDURE — 77030020274 HC MISC IMPL ORTHOPEDIC: Performed by: ORTHOPAEDIC SURGERY

## 2021-06-22 PROCEDURE — 80053 COMPREHEN METABOLIC PANEL: CPT

## 2021-06-22 PROCEDURE — 76210000039 HC AMBSU PH I REC 3.5 TO 4 HR: Performed by: ORTHOPAEDIC SURGERY

## 2021-06-22 PROCEDURE — 76060000065 HC AMB SURG ANES 2.5 TO 3 HR: Performed by: ORTHOPAEDIC SURGERY

## 2021-06-22 PROCEDURE — 30233N1 TRANSFUSION OF NONAUTOLOGOUS RED BLOOD CELLS INTO PERIPHERAL VEIN, PERCUTANEOUS APPROACH: ICD-10-PCS | Performed by: FAMILY MEDICINE

## 2021-06-22 PROCEDURE — 74011000272 HC RX REV CODE- 272: Performed by: ORTHOPAEDIC SURGERY

## 2021-06-22 PROCEDURE — 77030020788: Performed by: ORTHOPAEDIC SURGERY

## 2021-06-22 PROCEDURE — 74011000250 HC RX REV CODE- 250: Performed by: NURSE ANESTHETIST, CERTIFIED REGISTERED

## 2021-06-22 PROCEDURE — 82962 GLUCOSE BLOOD TEST: CPT

## 2021-06-22 PROCEDURE — 74011250636 HC RX REV CODE- 250/636: Performed by: ORTHOPAEDIC SURGERY

## 2021-06-22 PROCEDURE — 76030000022 HC AMB SURG 2.5 TO 3 HR INTENSV-TIER 1: Performed by: ORTHOPAEDIC SURGERY

## 2021-06-22 PROCEDURE — 0QSB04Z REPOSITION RIGHT LOWER FEMUR WITH INTERNAL FIXATION DEVICE, OPEN APPROACH: ICD-10-PCS | Performed by: ORTHOPAEDIC SURGERY

## 2021-06-22 PROCEDURE — 77030007866 HC KT SPN ANES BBMI -B: Performed by: ANESTHESIOLOGY

## 2021-06-22 PROCEDURE — 74011000250 HC RX REV CODE- 250: Performed by: ORTHOPAEDIC SURGERY

## 2021-06-22 PROCEDURE — 0QSBXZZ REPOSITION RIGHT LOWER FEMUR, EXTERNAL APPROACH: ICD-10-PCS | Performed by: EMERGENCY MEDICINE

## 2021-06-22 PROCEDURE — 36415 COLL VENOUS BLD VENIPUNCTURE: CPT

## 2021-06-22 PROCEDURE — 74011250636 HC RX REV CODE- 250/636: Performed by: FAMILY MEDICINE

## 2021-06-22 DEVICE — IMPLANTABLE DEVICE
Type: IMPLANTABLE DEVICE | Site: FEMUR | Status: FUNCTIONAL
Brand: PHOENIX NAIL SYSTEM

## 2021-06-22 DEVICE — IMPLANTABLE DEVICE: Type: IMPLANTABLE DEVICE | Site: FEMUR | Status: FUNCTIONAL

## 2021-06-22 DEVICE — IMPLANTABLE DEVICE
Type: IMPLANTABLE DEVICE | Site: FEMUR | Status: FUNCTIONAL
Brand: PHOENIX RETROGRADE NAIL SYSTEM

## 2021-06-22 RX ORDER — EPHEDRINE SULFATE/0.9% NACL/PF 50 MG/5 ML
SYRINGE (ML) INTRAVENOUS AS NEEDED
Status: DISCONTINUED | OUTPATIENT
Start: 2021-06-22 | End: 2021-06-22 | Stop reason: HOSPADM

## 2021-06-22 RX ORDER — ENOXAPARIN SODIUM 100 MG/ML
30 INJECTION SUBCUTANEOUS DAILY
Status: DISCONTINUED | OUTPATIENT
Start: 2021-06-22 | End: 2021-06-23

## 2021-06-22 RX ORDER — SODIUM CHLORIDE, SODIUM LACTATE, POTASSIUM CHLORIDE, CALCIUM CHLORIDE 600; 310; 30; 20 MG/100ML; MG/100ML; MG/100ML; MG/100ML
INJECTION, SOLUTION INTRAVENOUS
Status: DISCONTINUED | OUTPATIENT
Start: 2021-06-22 | End: 2021-06-22 | Stop reason: HOSPADM

## 2021-06-22 RX ORDER — FUROSEMIDE 10 MG/ML
20 INJECTION INTRAMUSCULAR; INTRAVENOUS ONCE
Status: DISCONTINUED | OUTPATIENT
Start: 2021-06-22 | End: 2021-06-22

## 2021-06-22 RX ORDER — LORAZEPAM 2 MG/ML
0.5 CONCENTRATE ORAL
COMMUNITY

## 2021-06-22 RX ORDER — LEVOTHYROXINE SODIUM 50 UG/1
50 TABLET ORAL
Status: DISCONTINUED | OUTPATIENT
Start: 2021-06-22 | End: 2021-06-25 | Stop reason: HOSPADM

## 2021-06-22 RX ORDER — SODIUM CHLORIDE 9 MG/ML
250 INJECTION, SOLUTION INTRAVENOUS AS NEEDED
Status: DISCONTINUED | OUTPATIENT
Start: 2021-06-22 | End: 2021-06-23

## 2021-06-22 RX ORDER — MORPHINE SULFATE 2 MG/ML
2 INJECTION, SOLUTION INTRAMUSCULAR; INTRAVENOUS
Status: DISCONTINUED | OUTPATIENT
Start: 2021-06-22 | End: 2021-06-25 | Stop reason: HOSPADM

## 2021-06-22 RX ORDER — KETAMINE HYDROCHLORIDE 50 MG/ML
0.5 INJECTION, SOLUTION INTRAMUSCULAR; INTRAVENOUS
Status: DISCONTINUED | OUTPATIENT
Start: 2021-06-22 | End: 2021-06-22

## 2021-06-22 RX ORDER — VANCOMYCIN HYDROCHLORIDE 1 G/20ML
INJECTION, POWDER, LYOPHILIZED, FOR SOLUTION INTRAVENOUS AS NEEDED
Status: DISCONTINUED | OUTPATIENT
Start: 2021-06-22 | End: 2021-06-22 | Stop reason: HOSPADM

## 2021-06-22 RX ORDER — PANTOPRAZOLE SODIUM 40 MG/1
40 TABLET, DELAYED RELEASE ORAL DAILY
COMMUNITY

## 2021-06-22 RX ORDER — ACETAMINOPHEN 325 MG/1
650 TABLET ORAL
Status: DISCONTINUED | OUTPATIENT
Start: 2021-06-22 | End: 2021-06-25 | Stop reason: HOSPADM

## 2021-06-22 RX ORDER — TRAMADOL HYDROCHLORIDE 50 MG/1
50 TABLET ORAL
Status: DISCONTINUED | OUTPATIENT
Start: 2021-06-22 | End: 2021-06-25 | Stop reason: HOSPADM

## 2021-06-22 RX ORDER — MIDAZOLAM HYDROCHLORIDE 1 MG/ML
INJECTION, SOLUTION INTRAMUSCULAR; INTRAVENOUS AS NEEDED
Status: DISCONTINUED | OUTPATIENT
Start: 2021-06-22 | End: 2021-06-22 | Stop reason: HOSPADM

## 2021-06-22 RX ORDER — HYDRALAZINE HYDROCHLORIDE 10 MG/1
5 TABLET, FILM COATED ORAL 2 TIMES DAILY
Status: DISCONTINUED | OUTPATIENT
Start: 2021-06-22 | End: 2021-06-25 | Stop reason: HOSPADM

## 2021-06-22 RX ORDER — FUROSEMIDE 10 MG/ML
20 INJECTION INTRAMUSCULAR; INTRAVENOUS ONCE
Status: COMPLETED | OUTPATIENT
Start: 2021-06-22 | End: 2021-06-22

## 2021-06-22 RX ORDER — FACIAL-BODY WIPES
10 EACH TOPICAL
COMMUNITY

## 2021-06-22 RX ORDER — SODIUM CHLORIDE 0.9 % (FLUSH) 0.9 %
5-40 SYRINGE (ML) INJECTION AS NEEDED
Status: DISCONTINUED | OUTPATIENT
Start: 2021-06-22 | End: 2021-06-25 | Stop reason: HOSPADM

## 2021-06-22 RX ORDER — ONDANSETRON 2 MG/ML
4 INJECTION INTRAMUSCULAR; INTRAVENOUS
Status: DISCONTINUED | OUTPATIENT
Start: 2021-06-22 | End: 2021-06-25 | Stop reason: HOSPADM

## 2021-06-22 RX ORDER — PROPOFOL 10 MG/ML
INJECTION, EMULSION INTRAVENOUS
Status: DISCONTINUED | OUTPATIENT
Start: 2021-06-22 | End: 2021-06-22 | Stop reason: HOSPADM

## 2021-06-22 RX ORDER — FENTANYL CITRATE 50 UG/ML
50 INJECTION, SOLUTION INTRAMUSCULAR; INTRAVENOUS
Status: COMPLETED | OUTPATIENT
Start: 2021-06-22 | End: 2021-06-22

## 2021-06-22 RX ORDER — POVIDONE-IODINE 10 %
SOLUTION, NON-ORAL TOPICAL AS NEEDED
Status: DISCONTINUED | OUTPATIENT
Start: 2021-06-22 | End: 2021-06-22 | Stop reason: HOSPADM

## 2021-06-22 RX ORDER — POLYETHYLENE GLYCOL 3350 17 G/17G
17 POWDER, FOR SOLUTION ORAL DAILY PRN
Status: DISCONTINUED | OUTPATIENT
Start: 2021-06-22 | End: 2021-06-25 | Stop reason: HOSPADM

## 2021-06-22 RX ORDER — PANTOPRAZOLE SODIUM 40 MG/1
40 TABLET, DELAYED RELEASE ORAL
Status: DISCONTINUED | OUTPATIENT
Start: 2021-06-22 | End: 2021-06-25 | Stop reason: HOSPADM

## 2021-06-22 RX ORDER — KETAMINE HYDROCHLORIDE 10 MG/ML
INJECTION, SOLUTION INTRAMUSCULAR; INTRAVENOUS AS NEEDED
Status: DISCONTINUED | OUTPATIENT
Start: 2021-06-22 | End: 2021-06-22 | Stop reason: HOSPADM

## 2021-06-22 RX ORDER — SODIUM CHLORIDE 0.9 % (FLUSH) 0.9 %
5-40 SYRINGE (ML) INJECTION EVERY 8 HOURS
Status: DISCONTINUED | OUTPATIENT
Start: 2021-06-22 | End: 2021-06-25 | Stop reason: HOSPADM

## 2021-06-22 RX ORDER — SODIUM CHLORIDE 9 MG/ML
75 INJECTION, SOLUTION INTRAVENOUS CONTINUOUS
Status: DISCONTINUED | OUTPATIENT
Start: 2021-06-22 | End: 2021-06-25

## 2021-06-22 RX ORDER — MORPHINE SULFATE 20 MG/ML
5 SOLUTION ORAL
COMMUNITY

## 2021-06-22 RX ORDER — ACETAMINOPHEN 650 MG/1
650 SUPPOSITORY RECTAL
COMMUNITY

## 2021-06-22 RX ORDER — ONDANSETRON 4 MG/1
4 TABLET, ORALLY DISINTEGRATING ORAL
Status: DISCONTINUED | OUTPATIENT
Start: 2021-06-22 | End: 2021-06-25 | Stop reason: HOSPADM

## 2021-06-22 RX ORDER — ACETAMINOPHEN 500 MG
500 TABLET ORAL
COMMUNITY

## 2021-06-22 RX ORDER — KETAMINE HYDROCHLORIDE 50 MG/ML
0.5 INJECTION, SOLUTION INTRAMUSCULAR; INTRAVENOUS
Status: COMPLETED | OUTPATIENT
Start: 2021-06-22 | End: 2021-06-22

## 2021-06-22 RX ORDER — ACETAMINOPHEN 650 MG/1
650 SUPPOSITORY RECTAL
Status: DISCONTINUED | OUTPATIENT
Start: 2021-06-22 | End: 2021-06-25 | Stop reason: HOSPADM

## 2021-06-22 RX ORDER — ATROPINE SULFATE 10 MG/ML
1 SOLUTION/ DROPS OPHTHALMIC
COMMUNITY

## 2021-06-22 RX ORDER — LOPERAMIDE HCL 2 MG
2 TABLET ORAL
COMMUNITY

## 2021-06-22 RX ORDER — BUPIVACAINE HYDROCHLORIDE 7.5 MG/ML
INJECTION, SOLUTION EPIDURAL; RETROBULBAR
Status: SHIPPED | OUTPATIENT
Start: 2021-06-22 | End: 2021-06-22

## 2021-06-22 RX ORDER — ROPIVACAINE HYDROCHLORIDE 5 MG/ML
INJECTION, SOLUTION EPIDURAL; INFILTRATION; PERINEURAL AS NEEDED
Status: DISCONTINUED | OUTPATIENT
Start: 2021-06-22 | End: 2021-06-22 | Stop reason: HOSPADM

## 2021-06-22 RX ORDER — KETAMINE HYDROCHLORIDE 100 MG/ML
0.5 INJECTION, SOLUTION INTRAMUSCULAR; INTRAVENOUS
Status: DISCONTINUED | OUTPATIENT
Start: 2021-06-22 | End: 2021-06-22

## 2021-06-22 RX ADMIN — KETAMINE HYDROCHLORIDE 22 MG: 50 INJECTION, SOLUTION INTRAMUSCULAR; INTRAVENOUS at 06:28

## 2021-06-22 RX ADMIN — VANCOMYCIN HYDROCHLORIDE 1000 MG: 1 INJECTION, POWDER, LYOPHILIZED, FOR SOLUTION INTRAVENOUS at 13:30

## 2021-06-22 RX ADMIN — Medication 10 MG: at 13:44

## 2021-06-22 RX ADMIN — SODIUM CHLORIDE 40 MCG: 9 INJECTION INTRAMUSCULAR; INTRAVENOUS; SUBCUTANEOUS at 14:12

## 2021-06-22 RX ADMIN — PROPOFOL 20 MCG/KG/MIN: 10 INJECTION, EMULSION INTRAVENOUS at 14:34

## 2021-06-22 RX ADMIN — KETAMINE HYDROCHLORIDE 22 MG: 50 INJECTION, SOLUTION INTRAMUSCULAR; INTRAVENOUS at 06:11

## 2021-06-22 RX ADMIN — SODIUM CHLORIDE 80 MCG: 9 INJECTION INTRAMUSCULAR; INTRAVENOUS; SUBCUTANEOUS at 13:44

## 2021-06-22 RX ADMIN — FENTANYL CITRATE 50 MCG: 50 INJECTION, SOLUTION INTRAMUSCULAR; INTRAVENOUS at 09:08

## 2021-06-22 RX ADMIN — SODIUM CHLORIDE 80 MCG: 9 INJECTION INTRAMUSCULAR; INTRAVENOUS; SUBCUTANEOUS at 14:17

## 2021-06-22 RX ADMIN — SODIUM CHLORIDE 500 ML: 9 INJECTION, SOLUTION INTRAVENOUS at 17:50

## 2021-06-22 RX ADMIN — SODIUM CHLORIDE 80 MCG: 9 INJECTION INTRAMUSCULAR; INTRAVENOUS; SUBCUTANEOUS at 15:09

## 2021-06-22 RX ADMIN — SODIUM CHLORIDE, POTASSIUM CHLORIDE, SODIUM LACTATE AND CALCIUM CHLORIDE: 600; 310; 30; 20 INJECTION, SOLUTION INTRAVENOUS at 14:15

## 2021-06-22 RX ADMIN — SODIUM CHLORIDE 80 MCG: 9 INJECTION INTRAMUSCULAR; INTRAVENOUS; SUBCUTANEOUS at 13:48

## 2021-06-22 RX ADMIN — SODIUM CHLORIDE 1 G: 9 INJECTION, SOLUTION INTRAVENOUS at 14:25

## 2021-06-22 RX ADMIN — MIDAZOLAM 1 MG: 1 INJECTION, SOLUTION INTRAMUSCULAR; INTRAVENOUS at 13:35

## 2021-06-22 RX ADMIN — Medication 10 ML: at 09:22

## 2021-06-22 RX ADMIN — KETAMINE HYDROCHLORIDE 10 MG: 10 INJECTION INTRAMUSCULAR; INTRAVENOUS at 13:39

## 2021-06-22 RX ADMIN — SODIUM CHLORIDE, POTASSIUM CHLORIDE, SODIUM LACTATE AND CALCIUM CHLORIDE: 600; 310; 30; 20 INJECTION, SOLUTION INTRAVENOUS at 12:30

## 2021-06-22 RX ADMIN — FUROSEMIDE 20 MG: 10 INJECTION, SOLUTION INTRAMUSCULAR; INTRAVENOUS at 19:52

## 2021-06-22 RX ADMIN — KETAMINE HYDROCHLORIDE 20 MG: 10 INJECTION INTRAMUSCULAR; INTRAVENOUS at 13:36

## 2021-06-22 RX ADMIN — BUPIVACAINE HYDROCHLORIDE 12 MG: 7.5 INJECTION, SOLUTION EPIDURAL; RETROBULBAR at 13:48

## 2021-06-22 RX ADMIN — SODIUM CHLORIDE 75 ML/HR: 9 INJECTION, SOLUTION INTRAVENOUS at 09:21

## 2021-06-22 RX ADMIN — Medication 10 ML: at 10:07

## 2021-06-22 RX ADMIN — PHENYLEPHRINE HYDROCHLORIDE 20 MCG/MIN: 10 INJECTION INTRAVENOUS at 14:22

## 2021-06-22 RX ADMIN — SODIUM CHLORIDE 80 MCG: 9 INJECTION INTRAMUSCULAR; INTRAVENOUS; SUBCUTANEOUS at 14:05

## 2021-06-22 RX ADMIN — SODIUM CHLORIDE, POTASSIUM CHLORIDE, SODIUM LACTATE AND CALCIUM CHLORIDE: 600; 310; 30; 20 INJECTION, SOLUTION INTRAVENOUS at 15:54

## 2021-06-22 RX ADMIN — SODIUM CHLORIDE 75 ML/HR: 9 INJECTION, SOLUTION INTRAVENOUS at 10:01

## 2021-06-22 NOTE — PERIOP NOTES
Notified Farideh Murillo of patient hemoglobin level of 6.3 and the plan to transfuse 1 unit of PRBC. No new orders received. Will continue to monitor pt.

## 2021-06-22 NOTE — PERIOP NOTES
Pitting edema BLE, skin is pale and bruised with scratches, left 2nd toenail missing with dried blood present. Blister on top of left foot intact, open blister on right foot. MD aware, will dress wounds postop.

## 2021-06-22 NOTE — PROGRESS NOTES
Admission Medication Reconciliation:     Information obtained from:    Paperwork from [email or web address] at [facility] located in the patient's room. A copy was given to the ED unit sec to scan into the EMR. The PTA list was updated from the paperwork. Last doses are not listed on the paperwork. Prior to Admission Medications   Prescriptions Last Dose Informant Taking? LORazepam (LORazepam IntensoL) 2 mg/mL concentrated solution  Transfer Papers Yes   Sig: Take 0.5 mg by mouth every four (4) hours as needed for Anxiety. acetaminophen (TYLENOL) 500 mg tablet  Transfer Papers Yes   Sig: Take 500 mg by mouth nightly. Crush and place in apple sauce   Indications: pain   acetaminophen (TYLENOL) 650 mg suppository  Transfer Papers Yes   Sig: Insert 650 mg into rectum every four (4) hours as needed for Fever. acetaminophen (TylenoL) 325 mg tablet  Transfer Papers Yes   Sig: Take 2 Tabs by mouth every six (6) hours as needed for Fever. atropine 1 % ophthalmic solution  Transfer Papers Yes   Si Drop by SubLINGual route every two (2) hours as needed (secretions). bisacodyL (DULCOLAX) 10 mg supp  Transfer Papers Yes   Sig: Insert 10 mg into rectum daily as needed for Constipation. dimethicone (REMEDY DIMETHICONE) 5 % topical cream  Transfer Papers Yes   Sig: Apply  to affected area two (2) times a day. Apply to buttocks   hydrALAZINE (APRESOLINE) 10 mg tablet  Transfer Papers Yes   Sig: Take 0.5 Tabs by mouth two (2) times a day. levothyroxine (SYNTHROID) 50 mcg tablet  Transfer Papers Yes   Sig: Take 1 Tab by mouth Daily (before breakfast). loperamide (IMMODIUM) 2 mg tablet  Transfer Papers Yes   Sig: Take 2 mg by mouth four (4) times daily as needed for Diarrhea.    morphine (ROXANOL) 100 mg/5 mL (20 mg/mL) concentrated solution  Transfer Papers Yes   Sig: Take 5 mg by mouth every four (4) hours as needed for Pain or Shortness of Breath.   pantoprazole (Protonix) 40 mg tablet  Transfer Papers Yes   Sig: Take 40 mg by mouth daily. Facility-Administered Medications: None         Please contact the main inpatient pharmacy with any questions or concerns at [phone] and we will direct you to the clinical pharmacist covering this patient's care while in-house.    Gina Cardenas, SaurabhD, BCPS

## 2021-06-22 NOTE — ANESTHESIA PREPROCEDURE EVALUATION
Relevant Problems   No relevant active problems       Anesthetic History               Review of Systems / Medical History  Patient summary reviewed and nursing notes reviewed    Pulmonary  Within defined limits                 Neuro/Psych         Dementia     Cardiovascular    Hypertension                   GI/Hepatic/Renal     GERD          Comments: History GI bleed Endo/Other      Hypothyroidism  Arthritis    Comments: Osteoporosis  Borderline anemia Other Findings            Physical Exam    Airway             Cardiovascular    Rhythm: regular  Rate: normal         Dental         Pulmonary  Breath sounds clear to auscultation               Abdominal         Other Findings            Anesthetic Plan    ASA: 3  Anesthesia type: spinal            Anesthetic plan and risks discussed with: Son / Daughter, Legal guardian and Patient      Son who is power of  offered permission for surgery and anesthesia. He is not available via phone occasionally today. Patient appears to understand somewhat.

## 2021-06-22 NOTE — ED NOTES
Bedside and Verbal shift change report given to Guanakito Jackson RN (oncoming nurse) by Bridger Aguilar RN (offgoing nurse). Report included the following information SBAR, Kardex, ED Summary, [facility] and Recent Results.

## 2021-06-22 NOTE — PROGRESS NOTES
Pharmacy Dosing Services: 6/22/21    Consult for Enoxaparin by Dr. Ursula London made for this 80 y.o. female, for prophylaxis of  VTE. Wt Readings from Last 1 Encounters:   06/22/21 44 kg (96 lb 14.4 oz)       Ht Readings from Last 1 Encounters:   06/01/21 142.2 cm (56\")             Previous Dose Enoxaparin 40mg daily      Creatinine Clearance CrCl cannot be calculated (Unknown ideal weight.). Creatinine Lab Results   Component Value Date/Time    Creatinine 0.65 06/22/2021 06:07 AM         Platelet Lab Results   Component Value Date/Time    PLATELET 050 16/70/4379 06:07 AM        H/H Lab Results   Component Value Date/Time    HGB 10.4 (L) 06/22/2021 06:07 AM          Pharmacist made change to enoxaparin therapy based on:  [ ] Renal function: dose changed to:  [ ] Weight: dose changed to: enoxaparin 30mg daily for female weight 35-45kg per protocol     Pharmacy to automatically make dose adjustment for renal dysfunction (creatinine clearance less than 30 mL/min)  Pharmacy to automatically make dose adjustment for obesity (BMI greater than 40)  Pharmacy to make dose rounding adjustments per [facility] dose adjustment scale. Pharmacy to monitor patients progress. Will make dose adjustment as needed per changing renal function. Will communicate further recommendations regarding patients anticoagulation therapy with prescriber. Signed Yolanda Ceja .  Contact information: [phone]

## 2021-06-22 NOTE — PROGRESS NOTES
0900- TRANSFER - OUT REPORT:    Verbal report given to Dick José RN (name) on Luke Regalado  being transferred to Ortho (unit) for routine progression of care       Report consisted of patients Situation, Background, Assessment and   Recommendations(SBAR). Information from the following report(s) SBAR, Kardex, ED Summary, Intake/Output, MAR and Recent Results was reviewed with the receiving nurse. Lines:   Peripheral IV 06/22/21 Left Antecubital (Active)   Site Assessment Clean, dry, & intact 06/22/21 0800   Phlebitis Assessment 0 06/22/21 0800   Infiltration Assessment 0 06/22/21 0800   Dressing Status Clean, dry, & intact 06/22/21 0800   Dressing Type Transparent;Tape 06/22/21 0800   Hub Color/Line Status Pink; Infusing 06/22/21 0800   Alcohol Cap Used Yes 06/22/21 0800        Opportunity for questions and clarification was provided. Patient transported with:   Monitor  O2 @ 2 liters  Tech          0945- Before patient was transferred PA placed immobilizer, 50mcg of Fent. Given for pain, and patient cleaned.

## 2021-06-22 NOTE — PERIOP NOTES
Hgb level came back at 6.3, Dr. Julius Gomez and Dr. Javon Pabon notified and orders to give 1 unit of PRBC. Patient continues to be hemodynamically stable, will continue to monitor pt.

## 2021-06-22 NOTE — PERIOP NOTES
Called [facility] for assessment. Pulse Ox waveforms inconsistent and when reading waveform appears flat; BP's inconsistent and variable. Appears dry. Bolus IVF given. 18:00 - Hemocue checked R hand - fingers warm and pink but cap refill appears slightly sluggish yet <3sec - hemocue read ~5.9 - repeated hemocue L hand and was again low @ ~5.6 - MDA informed. Started PIV 18G R FA and CBC and BMP sent. Moved to main PACU at [address] and handoff given to [address] [place] [address] - labs pending.

## 2021-06-22 NOTE — ED TRIAGE NOTES
Pt. Rizwan Hook in by EMS for GLF, deformity noted to left lower extremity, pt. Was given 50mcg intranasal in route, pt. Also had daily dose of morphine around 0400.

## 2021-06-22 NOTE — PROGRESS NOTES
6/22/2021  11:54 AM  CM attempted p/c to pt's son for assessment, Yasmine Rothman, and left a voicemail requesting a returned p/c.

## 2021-06-22 NOTE — ANESTHESIA PROCEDURE NOTES
Spinal Block    Start time: 6/22/2021 1:40 PM  End time: 6/22/2021 1:51 PM  Performed by: César Lozada CRNA  Authorized by: César Lozada CRNA     Pre-procedure:   Indications: primary anesthetic  Preanesthetic Checklist: patient identified, risks and benefits discussed, anesthesia consent, site marked, patient being monitored and timeout performed      Spinal Block:   Patient Position:  Right lateral decubitus  Prep Region:  Lumbar  Prep: DuraPrep      Location:  L3-4          Needle:   Needle Type:  Pencan  Needle Gauge:  25 G  Attempts:  1      Events: CSF confirmed        Assessment:  Insertion:  Uncomplicated

## 2021-06-22 NOTE — CONSULTS
ORTHO CONSULT NOTE    Date of Consultation:  June 22, 2021  Referring Physician:  Reagan Trentr: right femur fracture    HPI:  Lisandra Leach is a 80 y.o. female PMH cardiac arrhythmia, HTN, anxiety, thyroid disease, TKR, and recent GI bleed April 2021 who c/o right thigh pain/deformity. Patient is unable to provide history. I performed chart review and d/w son, my physician assistant, and hospitalist.  Per report, patient resides at [facility] and fell out of bed this am resulting in pain and deformity. Imaging revealed bryant-prosthetic right distal supracondylar femur fracture. In the emergency room patient was placed into a knee immobilizer and admitted to the medical service. Patient is been seen by the medical service has been cleared for surgical intervention. Per son, patient was ambulatory with walker prior to her admission here April 2021 GIB. It sounds like she's used a wheelchair for mobility since that time but can transfer independently. No blood thinners at baseline. No history of reported hip pain.     Past Medical History:   Diagnosis Date    Arthritis     Hypertension     Hypothyroidism       Past Surgical History:   Procedure Laterality Date    HX CHOLECYSTECTOMY      HX GYN      partial hysterectomy, uterine prolapse    HX HEENT      cataract    HX ORTHOPAEDIC Right     knee replacement      Family History   Problem Relation Age of Onset    Heart Disease Neg Hx       Social History     Tobacco Use    Smoking status: Never Smoker    Smokeless tobacco: Never Used   Substance Use Topics    Alcohol use: Not Currently     Allergies   Allergen Reactions    Pcn [Penicillins] Unknown (comments)    Statins-Hmg-Coa Reductase Inhibitors Unknown (comments)    Warfarin Unknown (comments)        Review of Systems: Unable to obtain secondary to mental status    Objective:     Patient Vitals for the past 8 hrs:   BP Temp Pulse Resp SpO2 Weight   06/22/21 1231  97.7 °F (36.5 °C) 98  92 %    21 1215     94 %    21 0953 (!) 133/92 97.3 °F (36.3 °C) (!) 102 16 93 %    21 0930 118/68        21 0900 132/75 97.8 °F (36.6 °C) 92      21 0830 123/68        21 0630 125/69        21 0615 120/79        21 0600 (!) 135/100        21 0554 (!) 150/79        21 0552 (!) 150/79 97 °F (36.1 °C) 99 18 99 % 44 kg (96 lb 14.4 oz)     Temp (24hrs), Av.5 °F (36.4 °C), Min:97 °F (36.1 °C), Max:97.8 °F (36.6 °C)      EXAM:   Gen: Thin cachectic female NAD. Awake. Answers questions and follows commands however is not oriented to situation or place but is oriented to person. HEENT: Nontraumatic but does have slightly sunken in eyes and thinning of muscle contours, mucosal membranes are slightly dried out   CARDS: Regular rate and rhythm  RESP: Nonlaborred breathing, no use of accessory muscles  Abdomen: NT ND soft, no peritoneal signs  SKIN: Has multiple different aged bruises and skin marks also with pressure changes over the dorsum of the left foot as well as a blister over the right foot. Musculoskeletal:      No obvious pain PROM BUE. NTTP long bones and joints. No pain pelvic compression. LLE some pain PROM. NTTP long bones and joints. Moves foot OK with SILT and CR toes < 2 secs. Small partial thickness skin tear dorsal left foot. RLE shortened and internally rotated. Hip/thigh skin intact with old abrasion lateral right hip. Thigh compartments soft. Ankle and foot NTTP. Moves foot OK with SILT and CR toes < 2 secs. Small partial thickness skin tear medial right foot. Bilat calf soft and NTTP. Some peripheral edema. Imaging Review:     X-rays and CT scan of the right femur and knee demonstrate a well fixed total knee arthroplasty with periprosthetic supracondylar fracture with mild comminution of the anterior aspect. Overall osteopenia of the femur.   No obvious fractures proximally but does have advanced erosive changes of the right hip with loss of bony anatomy both on the acetabular and femoral side. No obvious tumors or lesions. Labs:   Recent Results (from the past 24 hour(s))   TYPE & SCREEN    Collection Time: 06/22/21  6:04 AM   Result Value Ref Range    Crossmatch Expiration 06/25/2021,2359     ABO/Rh(D) [facility] POSITIVE     Antibody screen NEG    SAMPLES BEING HELD    Collection Time: 06/22/21  6:07 AM   Result Value Ref Range    SAMPLES BEING HELD 1 SST, 1 RED     COMMENT        Add-on orders for these samples will be processed based on acceptable specimen integrity and analyte stability, which may vary by analyte. CBC WITH AUTOMATED DIFF    Collection Time: 06/22/21  6:07 AM   Result Value Ref Range    WBC 10.4 3.6 - 11.0 K/uL    RBC 3.65 (L) 3.80 - 5.20 M/uL    HGB 10.4 (L) 11.5 - 16.0 g/dL    HCT 32.7 (L) 35.0 - 47.0 %    MCV 89.6 80.0 - 99.0 FL    MCH 28.5 26.0 - 34.0 PG    MCHC 31.8 30.0 - 36.5 g/dL    RDW 16.0 (H) 11.5 - 14.5 %    PLATELET 730 744 - 194 K/uL    MPV 9.1 8.9 - 12.9 FL    NRBC 0.0 0  WBC    ABSOLUTE NRBC 0.00 0.00 - 0.01 K/uL    NEUTROPHILS 66 32 - 75 %    LYMPHOCYTES 25 12 - 49 %    MONOCYTES 6 5 - 13 %    EOSINOPHILS 2 0 - 7 %    BASOPHILS 1 0 - 1 %    IMMATURE GRANULOCYTES 0 0.0 - 0.5 %    ABS. NEUTROPHILS 6.8 1.8 - 8.0 K/UL    ABS. LYMPHOCYTES 2.6 0.8 - 3.5 K/UL    ABS. MONOCYTES 0.7 0.0 - 1.0 K/UL    ABS. EOSINOPHILS 0.2 0.0 - 0.4 K/UL    ABS. BASOPHILS 0.1 0.0 - 0.1 K/UL    ABS. IMM.  GRANS. 0.0 0.00 - 0.04 K/UL    DF AUTOMATED     METABOLIC PANEL, COMPREHENSIVE    Collection Time: 06/22/21  6:07 AM   Result Value Ref Range    Sodium 137 136 - 145 mmol/L    Potassium 4.3 3.5 - 5.1 mmol/L    Chloride 104 97 - 108 mmol/L    CO2 28 21 - 32 mmol/L    Anion gap 5 5 - 15 mmol/L    Glucose 127 (H) 65 - 100 mg/dL    BUN 16 6 - 20 MG/DL    Creatinine 0.65 0.55 - 1.02 MG/DL    BUN/Creatinine ratio 25 (H) 12 - 20      GFR est AA >60 >60 ml/min/1.73m2    GFR est non-AA >60 >60 ml/min/1.73m2    Calcium 7.5 (L) 8.5 - 10.1 MG/DL    Bilirubin, total 0.3 0.2 - 1.0 MG/DL    ALT (SGPT) 19 12 - 78 U/L    AST (SGOT) 28 15 - 37 U/L    Alk. phosphatase 89 45 - 117 U/L    Protein, total 5.2 (L) 6.4 - 8.2 g/dL    Albumin 2.1 (L) 3.5 - 5.0 g/dL    Globulin 3.1 2.0 - 4.0 g/dL    A-G Ratio 0.7 (L) 1.1 - 2.2     EKG, 12 LEAD, INITIAL    Collection Time: 06/22/21  7:27 AM   Result Value Ref Range    Ventricular Rate 108 BPM    Atrial Rate 127 BPM    QRS Duration 104 ms    Q-T Interval 336 ms    QTC Calculation (Bezet) 450 ms    Calculated R Axis -77 degrees    Calculated T Axis -3 degrees    Diagnosis       Atrial fibrillation with rapid ventricular response  Incomplete right bundle branch block  Left anterior fascicular block  Lateral infarct (cited on or before 18-APR-2021)  Abnormal ECG  When compared with ECG of 18-APR-2021 13:04,  ST no longer elevated in Inferior leads  ST no longer depressed in Lateral leads  Nonspecific T wave abnormality has replaced inverted T waves in Anterior   leads  Confirmed by Keena Elizalde MD, [id number] ([id number]) on 6/22/2021 12:07:15 PM         Impression:     Patient Active Problem List    Diagnosis Date Noted    Femur fracture ([address] 06/22/2021    Coffee ground emesis 04/18/2021    Acute GI bleeding 04/18/2021    HTN (hypertension), benign 04/18/2021    Hypothyroidism 04/18/2021    Arthritis 04/18/2021    Underweight 04/18/2021     Active Problems:    Femur fracture ([address] (6/22/2021)        Plan:   27-year-old female with declining functional capacity status post ground-level fall with right displaced supracondylar periprosthetic femur fracture. I had a lengthy conversation with patient's son this morning regarding options for management with nonoperative and surgical invention with open duction internal fixation versus intramedullary nailing. He elected to move for surgery.   With patient's functional status and to allow for hopeful early mobilization and range of motion and minimize immobilization will move forward with right femur retrograde IM nailing. We did discuss the risks and benefits of this with the son who understands that there are risks that include but not limited to infection, wound issues, periprosthetic fracture, nonunion, malunion, hardware failure, need for further surgeries, persistent pain, DVT/PE, or medical associate complications of heart attack, stroke, or death. Admit to medicine, appreciate help in medical management, patient currently cleared for surgery  Bedrest.  NPO. Ice. Patient's been marked and consented son verbally over the phone gave consent. Continue knee immobilizer until surgery  PT/OT: We will update postoperatively  Disposition: Likely back to her assisted living facility  Follow-up: 2 weeks at [address] [place] [address] with Shana Rivas PA-C call [phone] to schedule        MD Henrique Wise.  Javon Pabon MD  [facility] [phone]  Physician Assistant: Shana Rivas PA-C  Medical Staff : [place]/ Shamar Gomez  Office : [phone]

## 2021-06-22 NOTE — CONSULTS
ORTHO CONSULT NOTE    Date of Consultation:  June 22, 2021  Referring Physician:  Haresh Smith: right femur fracture    HPI:  Reinier Zhu is a 80 y.o. female PMH cardiac arrhythmia, HTN, anxiety, thyroid disease, TKR, and recent GI bleed April 2021 who c/o right thigh pain/deformity. Patient is unable to provide history. I performed chart review and d/w son, ER MD, and hospitalist.  Per report, patient resides at [facility] and fell out of bed this am resulting in pain and deformity. Imaging revealed bryant-prosthetic right distal femoral shaft fracture. Per ER note, patient was sedated with ketamine for femur reduction and placed in immobilizer. Per son, patient was ambulatory with walker prior to her admission here April 2021 GIB. It sounds like she's used a wheelchair for mobility since that time but can transfer independently. No blood thinners at baseline. Past Medical History:   Diagnosis Date    Arthritis     Hypertension     Hypothyroidism       Past Surgical History:   Procedure Laterality Date    HX CHOLECYSTECTOMY      HX GYN      partial hysterectomy, uterine prolapse    HX HEENT      cataract    HX ORTHOPAEDIC Right     knee replacement      Family History   Problem Relation Age of Onset    Heart Disease Neg Hx       Social History     Tobacco Use    Smoking status: Never Smoker    Smokeless tobacco: Never Used   Substance Use Topics    Alcohol use: Not Currently     Allergies   Allergen Reactions    Pcn [Penicillins] Unknown (comments)    Statins-Hmg-Coa Reductase Inhibitors Unknown (comments)    Warfarin Unknown (comments)        Review of Systems:  Per HPI.     Objective:     Patient Vitals for the past 8 hrs:   BP Temp Pulse Resp SpO2 Weight   06/22/21 0630 125/69        06/22/21 0615 120/79        06/22/21 0600 (!) 135/100        06/22/21 0554 (!) 150/79        06/22/21 0552 (!) 150/79 97 °F (36.1 °C) 99 18 99 % 44 kg (96 lb 14.4 oz)     Temp (24hrs), Av °F (36.1 °C), Min:97 °F (36.1 °C), Max:97 °F (36.1 °C)      EXAM:   NAD. Awake. Initially not conversant and not following commands. Later in exam she answers questions and does follow commands. No obvious pain PROM BUE. NTTP long bones and joints. No pain pelvic compression. LLE some pain PROM. NTTP long bones and joints. Moves foot OK with SILT and CR toes < 2 secs. Small partial thickness skin tear dorsal left foot. RLE shortened and internally rotated. Hip/thigh skin intact with old abrasion lateral right hip. Thigh compartments soft. Ankle and foot NTTP. Moves foot OK with SILT and CR toes < 2 secs. Small partial thickness skin tear medial right foot. Bilat calf soft and NTTP. Some peripheral edema. Imaging Review:   Results from Hospital Encounter encounter on 21    XR PELV AP ONLY    Narrative  INDICATION: Right lower extremity deformity    FINDINGS: AP imaging of the pelvis demonstrates no evidence of fracture or  dislocation. There is severe flattening and sclerosis of the right femoral head,  with associated right hip osteoarthritis. There is mild left hip osteoarthritis. Degenerative changes are present in the lumbar spine. The SI joints and pubic  symphysis appear intact. No soft tissue abnormalities are seen. Impression  No evidence of pelvic fracture or dislocation. Results from [place] encounter on 21    CT LOW EXT RT WO CONT    Narrative  EXAM:  CT LOW EXT RT WO CONT    HISTORY: Distal femur fracture    COMPARISON: Radiographs 2021    TECHNIQUE: Multiple contiguous axial, sagittal, and coronal sections were  obtained from a spiral volume acquisition of the right knee. No IV contrast was  administered. CT dose reduction was achieved through use of a standardized  protocol tailored for this examination and automatic exposure control for dose  modulation.     FINDINGS: The patient is status post total knee arthroplasty. The components are  in expected position. No evidence of loosening. There is an acute obliquely  oriented fracture of the distal femoral metadiaphysis. There is dorsomedial  displacement of the distal femur by approximately 2.1 cm. There are several  small surrounding osseous fragments. The distal leg is rotated internally. Osseous fragments come near to, but do not contact the popliteal artery. There  is extensive atherosclerotic disease. No interruption of the calcifications. There is extensive subcutaneous edema in the visualized bilateral lower  extremities. Impression  1. Displaced mildly comminuted acute fracture of the distal femoral  metadiaphysis. 2. Status post total knee arthroplasty. 3. Diffuse nonspecific subcutaneous edema of the visualized bilateral lower  extremities. Labs:   Recent Results (from the past 24 hour(s))   TYPE & SCREEN    Collection Time: 06/22/21  6:04 AM   Result Value Ref Range    Crossmatch Expiration 06/25/2021,2359     ABO/Rh(D) [place] POSITIVE     Antibody screen NEG    SAMPLES BEING HELD    Collection Time: 06/22/21  6:07 AM   Result Value Ref Range    SAMPLES BEING HELD 1 SST, 1 RED     COMMENT        Add-on orders for these samples will be processed based on acceptable specimen integrity and analyte stability, which may vary by analyte. CBC WITH AUTOMATED DIFF    Collection Time: 06/22/21  6:07 AM   Result Value Ref Range    WBC 10.4 3.6 - 11.0 K/uL    RBC 3.65 (L) 3.80 - 5.20 M/uL    HGB 10.4 (L) 11.5 - 16.0 g/dL    HCT 32.7 (L) 35.0 - 47.0 %    MCV 89.6 80.0 - 99.0 FL    MCH 28.5 26.0 - 34.0 PG    MCHC 31.8 30.0 - 36.5 g/dL    RDW 16.0 (H) 11.5 - 14.5 %    PLATELET 343 123 - 541 K/uL    MPV 9.1 8.9 - 12.9 FL    NRBC 0.0 0  WBC    ABSOLUTE NRBC 0.00 0.00 - 0.01 K/uL    NEUTROPHILS 66 32 - 75 %    LYMPHOCYTES 25 12 - 49 %    MONOCYTES 6 5 - 13 %    EOSINOPHILS 2 0 - 7 %    BASOPHILS 1 0 - 1 %    IMMATURE GRANULOCYTES 0 0.0 - 0.5 %    ABS. NEUTROPHILS 6.8 1.8 - 8.0 K/UL    ABS. LYMPHOCYTES 2.6 0.8 - 3.5 K/UL    ABS. MONOCYTES 0.7 0.0 - 1.0 K/UL    ABS. EOSINOPHILS 0.2 0.0 - 0.4 K/UL    ABS. BASOPHILS 0.1 0.0 - 0.1 K/UL    ABS. IMM. GRANS. 0.0 0.00 - 0.04 K/UL    DF AUTOMATED     METABOLIC PANEL, COMPREHENSIVE    Collection Time: 06/22/21  6:07 AM   Result Value Ref Range    Sodium 137 136 - 145 mmol/L    Potassium 4.3 3.5 - 5.1 mmol/L    Chloride 104 97 - 108 mmol/L    CO2 28 21 - 32 mmol/L    Anion gap 5 5 - 15 mmol/L    Glucose 127 (H) 65 - 100 mg/dL    BUN 16 6 - 20 MG/DL    Creatinine 0.65 0.55 - 1.02 MG/DL    BUN/Creatinine ratio 25 (H) 12 - 20      GFR est AA >60 >60 ml/min/1.73m2    GFR est non-AA >60 >60 ml/min/1.73m2    Calcium 7.5 (L) 8.5 - 10.1 MG/DL    Bilirubin, total 0.3 0.2 - 1.0 MG/DL    ALT (SGPT) 19 12 - 78 U/L    AST (SGOT) 28 15 - 37 U/L    Alk.  phosphatase 89 45 - 117 U/L    Protein, total 5.2 (L) 6.4 - 8.2 g/dL    Albumin 2.1 (L) 3.5 - 5.0 g/dL    Globulin 3.1 2.0 - 4.0 g/dL    A-G Ratio 0.7 (L) 1.1 - 2.2     EKG, 12 LEAD, INITIAL    Collection Time: 06/22/21  7:27 AM   Result Value Ref Range    Ventricular Rate 108 BPM    Atrial Rate 127 BPM    QRS Duration 104 ms    Q-T Interval 336 ms    QTC Calculation (Bezet) 450 ms    Calculated R Axis -77 degrees    Calculated T Axis -3 degrees    Diagnosis       Atrial fibrillation with rapid ventricular response  Incomplete right bundle branch block  Left anterior fascicular block  Lateral infarct (cited on or before 18-APR-2021)  Abnormal ECG  When compared with ECG of 18-APR-2021 13:04,  ST no longer elevated in Inferior leads  ST no longer depressed in Lateral leads  Nonspecific T wave abnormality has replaced inverted T waves in Anterior   leads         Impression:     Patient Active Problem List    Diagnosis Date Noted    [address] fracture ([facility] [address] [facility] 06/22/2021    Coffee ground emesis 04/18/2021    Acute GI bleeding 04/18/2021    HTN (hypertension), benign 04/18/2021    Hypothyroidism 04/18/2021    Arthritis 04/18/2021    Underweight 04/18/2021     Active Problems:    Femur fracture ([address] (6/22/2021)        Plan:   Dr. Hannah Bone spoke with son about the nature of the injury and discussed treatment options. I also discussed potential risks, benefits, and alternatives of surgery with son who consents. Plan for IM nail right femur fracture. No further medical evaluation needed per Dr. Kevin Holt. Bedrest.  NPO. Ice. SCDs OK. No TXA IV per protocol d/t AFib on EKG. No Heparin pre-op since headed to OR today. After d/w Dr. Tracie Fortune, patient received fentanyl 50 mcg IV. I then pulled in-line traction on right femur to correct rotational deformity and shortening and placed long knee immobilizer on patient. I managed RLE while RN X 2 changed soiled diaper. I then floated heels with pillow. Patient states she is comfortable. Dr. Hannah Bone is aware and agrees with above plan.       DIEGO Jacob  Orthopedic Trauma Service  [facility]

## 2021-06-22 NOTE — ED PROVIDER NOTES
Date: 6/22/2021  Patient Name: Shannan Wong    History of Presenting Illness     Chief Complaint   Patient presents with   [facility] Leg Pain       History Provided By: EMS    HPI: Shannan Wong, 80 y.o. female with a past medical history of hypertension and hypothyroid presents to the ED with cc of right lower extremity deformity and pain. According to EMS, patient had a ground-level fall out of bed around 4:30 AM.  Patient was try to get out of bed when she became tangled in her sheets and fell. Patient received 50 mg of fentanyl in route per EMS and had received her home dose of morphine at the nursing facility prior to arrival. Pt is in acute distress and actively screaming in pain. There are no other complaints, changes, or physical findings at this time. PCP: Abi Alex, DO    No current facility-administered medications on file prior to encounter. Current Outpatient Medications on File Prior to Encounter   Medication Sig Dispense Refill    hydrALAZINE (APRESOLINE) 10 mg tablet Take 0.5 Tabs by mouth two (2) times a day. 90 Tab 0    pantoprazole (PROTONIX) 40 mg tablet Take 1 Tab by mouth Before breakfast and dinner. 60 Tab 0    levothyroxine (SYNTHROID) 50 mcg tablet Take 1 Tab by mouth Daily (before breakfast). 30 Tab 0    acetaminophen (TylenoL) 325 mg tablet Take 2 Tabs by mouth every six (6) hours as needed for Fever.  30 Tab 0       Past History     Past Medical History:  Past Medical History:   Diagnosis Date    Arthritis     Hypertension     Hypothyroidism        Past Surgical History:  Past Surgical History:   Procedure Laterality Date    HX CHOLECYSTECTOMY      HX GYN      partial hysterectomy, uterine prolapse    HX HEENT      cataract    HX ORTHOPAEDIC Right     knee replacement       Family History:  Family History   Problem Relation Age of Onset    Heart Disease Neg Hx        Social History:  Social History     Tobacco Use    Smoking status: Never Smoker    Smokeless tobacco: Never Used   Substance Use Topics    Alcohol use: Not Currently    Drug use: Not on file       Allergies: Allergies   Allergen Reactions    Pcn [Penicillins] Unknown (comments)    Statins-Hmg-Coa Reductase Inhibitors Unknown (comments)    Warfarin Unknown (comments)         Review of Systems   Review of Systems   Unable to perform ROS: Acuity of condition       Physical Exam   Physical Exam  Constitutional:       General: She is in acute distress. Appearance: Normal appearance. HENT:      Head: Normocephalic and atraumatic. Eyes:      Extraocular Movements: Extraocular movements intact. Conjunctiva/sclera: Conjunctivae normal.   Cardiovascular:      Rate and Rhythm: Normal rate. Pulses: Normal pulses. Pulmonary:      Effort: Pulmonary effort is normal.      Breath sounds: Normal breath sounds. Musculoskeletal:      Comments: Right lower extremity is externally rotated at the knee. Severe tenderness to palpation over the right thigh with noted deformity. No tenderness to palpation over the right hip.  +2 DP pulses   Neurological:      Mental Status: She is alert. Diagnostic Study Results     Labs -  Recent Results (from the past 72 hour(s))   SAMPLES BEING HELD    Collection Time: 06/22/21  6:07 AM   Result Value Ref Range    SAMPLES BEING HELD 1 SST, 1 RED     COMMENT        Add-on orders for these samples will be processed based on acceptable specimen integrity and analyte stability, which may vary by analyte.    CBC WITH AUTOMATED DIFF    Collection Time: 06/22/21  6:07 AM   Result Value Ref Range    WBC 10.4 3.6 - 11.0 K/uL    RBC 3.65 (L) 3.80 - 5.20 M/uL    HGB 10.4 (L) 11.5 - 16.0 g/dL    HCT 32.7 (L) 35.0 - 47.0 %    MCV 89.6 80.0 - 99.0 FL    MCH 28.5 26.0 - 34.0 PG    MCHC 31.8 30.0 - 36.5 g/dL    RDW 16.0 (H) 11.5 - 14.5 %    PLATELET 799 801 - 491 K/uL    MPV 9.1 8.9 - 12.9 FL    NRBC 0.0 0  WBC    ABSOLUTE NRBC 0.00 0.00 - 0.01 K/uL NEUTROPHILS 66 32 - 75 %    LYMPHOCYTES 25 12 - 49 %    MONOCYTES 6 5 - 13 %    EOSINOPHILS 2 0 - 7 %    BASOPHILS 1 0 - 1 %    IMMATURE GRANULOCYTES 0 0.0 - 0.5 %    ABS. NEUTROPHILS 6.8 1.8 - 8.0 K/UL    ABS. LYMPHOCYTES 2.6 0.8 - 3.5 K/UL    ABS. MONOCYTES 0.7 0.0 - 1.0 K/UL    ABS. EOSINOPHILS 0.2 0.0 - 0.4 K/UL    ABS. BASOPHILS 0.1 0.0 - 0.1 K/UL    ABS. IMM. GRANS. 0.0 0.00 - 0.04 K/UL    DF AUTOMATED     METABOLIC PANEL, COMPREHENSIVE    Collection Time: 06/22/21  6:07 AM   Result Value Ref Range    Sodium 137 136 - 145 mmol/L    Potassium 4.3 3.5 - 5.1 mmol/L    Chloride 104 97 - 108 mmol/L    CO2 28 21 - 32 mmol/L    Anion gap 5 5 - 15 mmol/L    Glucose 127 (H) 65 - 100 mg/dL    BUN 16 6 - 20 MG/DL    Creatinine 0.65 0.55 - 1.02 MG/DL    BUN/Creatinine ratio 25 (H) 12 - 20      GFR est AA >60 >60 ml/min/1.73m2    GFR est non-AA >60 >60 ml/min/1.73m2    Calcium 7.5 (L) 8.5 - 10.1 MG/DL    Bilirubin, total 0.3 0.2 - 1.0 MG/DL    ALT (SGPT) 19 12 - 78 U/L    AST (SGOT) 28 15 - 37 U/L    Alk. phosphatase 89 45 - 117 U/L    Protein, total 5.2 (L) 6.4 - 8.2 g/dL    Albumin 2.1 (L) 3.5 - 5.0 g/dL    Globulin 3.1 2.0 - 4.0 g/dL    A-G Ratio 0.7 (L) 1.1 - 2.2         Radiologic Studies -   XR FEMUR RT 2 VS   Final Result   Displaced oblique fracture of the distal femoral shaft. XR KNEE RT MAX 2 VWS   Final Result   Displaced oblique fracture of the distal femoral shaft. XR PELV AP ONLY   Final Result   No evidence of pelvic fracture or dislocation. CT Results  (Last 48 hours)    None        CXR Results  (Last 48 hours)    None          Medical Decision Making   I am the first provider for this patient. I reviewed the vital signs, available nursing notes, past medical history, past surgical history, family history and social history. Vital Signs-Reviewed the patient's vital signs.   Patient Vitals for the past 12 hrs:   Temp Pulse Resp BP SpO2   06/22/21 0630    125/69    06/22/21 0615    120/79    06/22/21 0600    (!) 135/100    06/22/21 0554    (!) 150/79    06/22/21 0552 97 °F (36.1 °C) 99 18 (!) 150/79 99 %         Records Reviewed: Nursing Notes and Old Medical Records    Provider Notes (Medical Decision Making):   DDx: fracture, dislocation    ED Course:   Initial assessment performed. The patients presenting problems have been discussed, and they are in agreement with the care plan formulated and outlined with them. I have encouraged them to ask questions as they arise throughout their visit. EKG interpreted by me. Shows atrial fibrillation with rvr with a HR of 108. No ST elevations or depressions concerning for ischemia. LAD. Normal QRS      CONSULT NOTE:   7:11 AM  Jason Borjas DO  spoke with Dr. Ike Miner,   Specialty: Ortho  Discussed pt's hx, disposition, and available diagnostic and imaging results. Reviewed care plans. Consultant agrees with plans as outlined. Recommended admission to medicine, keep NPO and place patient in a long knee immobilizer. Also requested CT of the R leg hip through knee    Progress Note:  7:15 AM  Spoke with pt's son Tommy Morales to update him on pt's status and plan of care. He consents to treatment and sedation as needed. Witnessed by nurse Mirza Michele. He is the POA and confirms she is a DNR. PROCEDURES  Procedure Note - Reduction:    7:12 AM  Performed by Jason Borjas DO      Procedure was performed without a block. Medications provided as below:  Medications   ketamine (KETALAR) 50 mg/mL injection 22 mg (22 mg IntraVENous Given 6/22/21 0611)   ketamine (KETALAR) 50 mg/mL injection 22 mg (22 mg IntraVENous Given 6/22/21 0628)       Immediately prior to the procedure, the patient was reevaluated and found suitable for the planned procedure and any planned medications. Immediately prior to the procedure a time out was called to verify the correct patient, procedure, equipment, staff, and marking as appropriate.     Prior to the procedure, neurovascular exam was intact. Analgesia was obtained with parenteral analgesics. To achieve reduction of the patient's right femur joint, logitudinal traction and internal rotation manipulation was utilized. The femur was successfully reduced. Neurovascular exam was intact following the procedure. Post reduction x-ray ordered. The procedure took 1-15 minutes, and pt tolerated well. Perfect Serve Consult for Admission  7:14 AM    ED Room Number: ER12/12  Patient Name and age:  Cipriano Mitchell 80 y.o.  female  Working Diagnosis:   1. Closed displaced comminuted fracture of shaft of right femur, initial encounter ([facility] [address]        COVID-19 Suspicion:  no  Sepsis present:  no  Reassessment needed: no  Code Status:  Do Not Resuscitate  Readmission: no  Isolation Requirements:  no  Recommended Level of Care:  med/surg  Department:[facility] ED - [phone]  Other: 20-year-old female status post ground-level fall at the nursing facility. She has a right distal femoral shaft fracture she got ketamine for pain control as well as fentanyl. Femur was reduced and placed in a knee immobilizer. Ortho has been consulted and requested a CT of the right lower extremity. Disposition:      Admit to hospitalist        Diagnosis     Clinical Impression:   1. Closed displaced comminuted fracture of shaft of right femur, initial encounter [facility])        Attestations:    Miller Lane, DO    Please note that this dictation was completed with [employer], the computer voice recognition software. Quite often unanticipated grammatical, syntax, homophones, and other interpretive errors are inadvertently transcribed by the computer software. Please disregard these errors. Please excuse any errors that have escaped final proofreading. Thank you.

## 2021-06-22 NOTE — H&P
[address] Adult  Hospitalist Group    History & Physical    Date of service: 6/22/2021    Patient name: Yevgeniy Nation  MRN: [id number]  YOB: 1930  Age: 80 y.o. Primary care provider:  Joya Roberts DO     Source of Information: patient, medical records                            Chief complain: fall    History of present illness  Yevgeniy Nation is a 80 y.o. female who presented with a GLF early this morning at the nursing home. She had apparantly gotten tangled in her sheets and tried to get out of bed when she fell. She complains of severe pain in her right leg. She was found to have a right femur fracture, which was reduced in the ED. Orthopedics has been consulting and awaiting results of CT of the RLE which is currently pending. Past Medical History:   Diagnosis Date    Arthritis     Hypertension     Hypothyroidism       Past Surgical History:   Procedure Laterality Date    HX CHOLECYSTECTOMY      HX GYN      partial hysterectomy, uterine prolapse    HX HEENT      cataract    HX ORTHOPAEDIC Right     knee replacement     Prior to Admission medications    Medication Sig Start Date End Date Taking? Authorizing Provider   hydrALAZINE (APRESOLINE) 10 mg tablet Take 0.5 Tabs by mouth two (2) times a day. 4/25/21   Zeenat Esequiel, DO   pantoprazole (PROTONIX) 40 mg tablet Take 1 Tab by mouth Before breakfast and dinner. 4/23/21   Zeenat Barren, DO   levothyroxine (SYNTHROID) 50 mcg tablet Take 1 Tab by mouth Daily (before breakfast). 4/23/21   Zeenat Esequiel, DO   acetaminophen (TylenoL) 325 mg tablet Take 2 Tabs by mouth every six (6) hours as needed for Fever.  4/23/21   Zeenat Esequiel, DO     Allergies   Allergen Reactions    Pcn [Penicillins] Unknown (comments)    Statins-Hmg-Coa Reductase Inhibitors Unknown (comments)    Warfarin Unknown (comments)      Family History   Problem Relation Age of Onset    Heart Disease Neg Hx          Social history    Social History Tobacco Use   Smoking Status Never Smoker   Smokeless Tobacco Never Used       Social History     Substance and Sexual Activity   Alcohol Use Not Currently       Code status  Code status discussed with the patient/caregivers. DNR    Review of systems    A comprehensive review of systems was negative except for that written in the History of Present Illness. Physical Examination   Visit Vitals  /69   Pulse 99   Temp 97 °F (36.1 °C)   Resp 18   Wt 44 kg (96 lb 14.4 oz)   SpO2 99%   BMI 21.72 kg/m²          O2 Device: None (Room air)    General:  Alert, cooperative, no distress   Head:  Normocephalic, without obvious abnormality, atraumatic   Eyes:  Conjunctivae/corneas clear. PERRL, EOMs intact   Head/Neck: Nares normal. No nasal drainage or sinus tenderness  Lips, mucosa, and tongue normal   Teeth and gums normal  Clear oropharynx  Trachea midline  No palpable adenopathy  No thyroid enlargement, tenderness     Lungs:   Symmetrical chest expansion and respiratory effort  Clear to auscultation bilaterally       Heart:  Regular rhythm   Sounds normal; no murmur, rub or gallop   Abdomen:   Soft, no tenderness  Bowel sounds normal  No masses or hepatosplenomegaly     Back: No CVA tenderness   Extremities: Extremities normal, atraumatic  No cyanosis or edema     Skin: No rashes or ulcers   Musculo-      skeletal: Gait not tested  Normal symmetry, ROM, strength and tone   Neuro: Cranial nerves grossly normal     Psych: Alert, oriented x3  Normal affect, judgement and insight     Data Review    24 Hour Results:  Recent Results (from the past 24 hour(s))   SAMPLES BEING HELD    Collection Time: 06/22/21  6:07 AM   Result Value Ref Range    SAMPLES BEING HELD 1 SST, 1 RED     COMMENT        Add-on orders for these samples will be processed based on acceptable specimen integrity and analyte stability, which may vary by analyte.    CBC WITH AUTOMATED DIFF    Collection Time: 06/22/21  6:07 AM   Result Value Ref Range    WBC 10.4 3.6 - 11.0 K/uL    RBC 3.65 (L) 3.80 - 5.20 M/uL    HGB 10.4 (L) 11.5 - 16.0 g/dL    HCT 32.7 (L) 35.0 - 47.0 %    MCV 89.6 80.0 - 99.0 FL    MCH 28.5 26.0 - 34.0 PG    MCHC 31.8 30.0 - 36.5 g/dL    RDW 16.0 (H) 11.5 - 14.5 %    PLATELET 744 624 - 376 K/uL    MPV 9.1 8.9 - 12.9 FL    NRBC 0.0 0  WBC    ABSOLUTE NRBC 0.00 0.00 - 0.01 K/uL    NEUTROPHILS 66 32 - 75 %    LYMPHOCYTES 25 12 - 49 %    MONOCYTES 6 5 - 13 %    EOSINOPHILS 2 0 - 7 %    BASOPHILS 1 0 - 1 %    IMMATURE GRANULOCYTES 0 0.0 - 0.5 %    ABS. NEUTROPHILS 6.8 1.8 - 8.0 K/UL    ABS. LYMPHOCYTES 2.6 0.8 - 3.5 K/UL    ABS. MONOCYTES 0.7 0.0 - 1.0 K/UL    ABS. EOSINOPHILS 0.2 0.0 - 0.4 K/UL    ABS. BASOPHILS 0.1 0.0 - 0.1 K/UL    ABS. IMM. GRANS. 0.0 0.00 - 0.04 K/UL    DF AUTOMATED     METABOLIC PANEL, COMPREHENSIVE    Collection Time: 06/22/21  6:07 AM   Result Value Ref Range    Sodium 137 136 - 145 mmol/L    Potassium 4.3 3.5 - 5.1 mmol/L    Chloride 104 97 - 108 mmol/L    CO2 28 21 - 32 mmol/L    Anion gap 5 5 - 15 mmol/L    Glucose 127 (H) 65 - 100 mg/dL    BUN 16 6 - 20 MG/DL    Creatinine 0.65 0.55 - 1.02 MG/DL    BUN/Creatinine ratio 25 (H) 12 - 20      GFR est AA >60 >60 ml/min/1.73m2    GFR est non-AA >60 >60 ml/min/1.73m2    Calcium 7.5 (L) 8.5 - 10.1 MG/DL    Bilirubin, total 0.3 0.2 - 1.0 MG/DL    ALT (SGPT) 19 12 - 78 U/L    AST (SGOT) 28 15 - 37 U/L    Alk.  phosphatase 89 45 - 117 U/L    Protein, total 5.2 (L) 6.4 - 8.2 g/dL    Albumin 2.1 (L) 3.5 - 5.0 g/dL    Globulin 3.1 2.0 - 4.0 g/dL    A-G Ratio 0.7 (L) 1.1 - 2.2       Recent Labs     06/22/21  0607   WBC 10.4   HGB 10.4*   HCT 32.7*        Recent Labs     06/22/21  0607      K 4.3      CO2 28   *   BUN 16   CREA 0.65   CA 7.5*   ALB 2.1*   TBILI 0.3   ALT 19       Imaging  XR PELV AP ONLY    Result Date: 6/22/2021  INDICATION: Right lower extremity deformity FINDINGS: AP imaging of the pelvis demonstrates no evidence of fracture or dislocation. There is severe flattening and sclerosis of the right femoral head, with associated right hip osteoarthritis. There is mild left hip osteoarthritis. Degenerative changes are present in the lumbar spine. The SI joints and pubic symphysis appear intact. No soft tissue abnormalities are seen. No evidence of pelvic fracture or dislocation. XR FEMUR RT 2 VS    Result Date: 6/22/2021  EXAM: XR FEMUR RT 2 VS, XR KNEE RT MAX 2 VWS INDICATION: RLE deformity. COMPARISON: None. FINDINGS: Two views of the right femur and 2 additional views of the right knee demonstrate a displaced oblique fracture of the distal femoral shaft. Total knee arthroplasty hardware appears intact. There is severe deformity of the femoral head, with associated severe hip osteoarthritis. Displaced oblique fracture of the distal femoral shaft. XR KNEE RT MAX 2 VWS    Result Date: 6/22/2021  EXAM: XR FEMUR RT 2 VS, XR KNEE RT MAX 2 VWS INDICATION: RLE deformity. COMPARISON: None. FINDINGS: Two views of the right femur and 2 additional views of the right knee demonstrate a displaced oblique fracture of the distal femoral shaft. Total knee arthroplasty hardware appears intact. There is severe deformity of the femoral head, with associated severe hip osteoarthritis. Displaced oblique fracture of the distal femoral shaft. Assessment and Plan     Right femur fracture  -fracture reduced in ED, ortho consulted, awaiting CT or RLE  -tramadol, IV morphine prn for pain  -CM for discharge planning  -if surgery is needed, okay to proceed, she would be moderate risk. Hypothyroidism  -check TSH  -cont LT$    GERD  -cont protonix    HTN  -cont home hydralazine      Diet: regular  Activity: as tolerated  DVT prophylaxis: lovenox  Isolation precautions: none       Signed by:  Fritz Alvarez MD    June 22, 2021 at 7:52 AM

## 2021-06-22 NOTE — PERIOP NOTES
PACU IN REPORT FROM ANESTHESIA    Verbal report received from   Madonna Leahy   [] MD/DO-Anesthesiologist    [x] CRNA   [] with student    CHOICE ANESTHESIA:  [] GENERAL  [] TIVA  [x] MAC  [] LOCAL  [] REGIONAL  [x] SPINAL   [] EPIDURAL   **Note the anesthesia record for medications given intraoperatively. **           [] E.R.A.S. PROTOCOL    SURGICAL PROCEDURE: Procedure(s) (LRB):  FEMUR DISTAL OPEN REDUCTION INTERNAL FIXATION (Right)     SURGEON: Heidy De L aCruz MD.    Brief Initial Visual Assessment:    Patient Age: [] Infant(1-12mo)      []Pediatric(1-13yrs)    [] Adolescent(13-18yrs)    [] Adult(18-65yrs)      [x]Geriatric Adult(>65yrs). Patient    [] Alert           []Calm & Cooperative      [] Anxious  Appearance: [] Drowsy      [] Sedated      [x] Unresponsive     Oriented x  0            Airway:     [x] Patent                          [] Obstructs easily/Obstructed on arrival   [] \"Difficult Airway\" report by Anesthesia                        [] Airway improved with head/airway repositioning                       Airway Adjuncts Present: [] Oral Airway    [] Nasal Trumpet    [] ETT    [] LMA            Respiratory  [x] Even   [] Labored   [] Shallow   [] Tachypnea   [] Bradypnea  Pattern:    [x] Non-Labored  [] VENT and/or respiratory assistance     being provided. Skin:     [x] Pink [] Dusky    [] Pale        [] Warm    [] Hot [x] Cool       [] Cold   [x]Dry [] Moist [] Diaphoretic     Membranes:  [x] Pink [] Pale       [x] Moist [] Dry     [] Crusty     Pain:   [x] No Acute Discomfort. 0  /10 Scale [x] Verbal Numeric   [] Moderate Discomfort.      [] V.A.S. [] Acute Discomfort.                [] A.N.V.    [] Chronic-Issue Related Discomfort.   [] F.L.A.C.C. Note E-MAR for medications administered. []Faces, Hackett/Baker    Note assessments documented in flowsheets; any assessment variants to be found in comments or narrative perioperative nurse notes.        Post-anesthesia care now assumed by Chance Dallas BSN, RN-BC

## 2021-06-23 ENCOUNTER — APPOINTMENT (OUTPATIENT)
Dept: GENERAL RADIOLOGY | Age: 86
DRG: 480 | End: 2021-06-23
Attending: STUDENT IN AN ORGANIZED HEALTH CARE EDUCATION/TRAINING PROGRAM
Payer: MEDICARE

## 2021-06-23 LAB
ABO + RH BLD: NORMAL
ANION GAP SERPL CALC-SCNC: 7 MMOL/L (ref 5–15)
BLD PROD TYP BPU: NORMAL
BLOOD GROUP ANTIBODIES SERPL: NORMAL
BPU ID: NORMAL
BUN SERPL-MCNC: 16 MG/DL (ref 6–20)
BUN/CREAT SERPL: 31 (ref 12–20)
CALCIUM SERPL-MCNC: 7.2 MG/DL (ref 8.5–10.1)
CHLORIDE SERPL-SCNC: 107 MMOL/L (ref 97–108)
CO2 SERPL-SCNC: 24 MMOL/L (ref 21–32)
CREAT SERPL-MCNC: 0.51 MG/DL (ref 0.55–1.02)
CROSSMATCH RESULT,%XM: NORMAL
ERYTHROCYTE [DISTWIDTH] IN BLOOD BY AUTOMATED COUNT: 15 % (ref 11.5–14.5)
GLUCOSE SERPL-MCNC: 103 MG/DL (ref 65–100)
HCT VFR BLD AUTO: 30 % (ref 35–47)
HGB BLD-MCNC: 9.3 G/DL (ref 11.5–16)
MCH RBC QN AUTO: 28.4 PG (ref 26–34)
MCHC RBC AUTO-ENTMCNC: 31 G/DL (ref 30–36.5)
MCV RBC AUTO: 91.5 FL (ref 80–99)
NRBC # BLD: 0 K/UL (ref 0–0.01)
NRBC BLD-RTO: 0 PER 100 WBC
PLATELET # BLD AUTO: 167 K/UL (ref 150–400)
PMV BLD AUTO: 10.1 FL (ref 8.9–12.9)
POTASSIUM SERPL-SCNC: 4.6 MMOL/L (ref 3.5–5.1)
RBC # BLD AUTO: 3.28 M/UL (ref 3.8–5.2)
SODIUM SERPL-SCNC: 138 MMOL/L (ref 136–145)
SPECIMEN EXP DATE BLD: NORMAL
STATUS OF UNIT,%ST: NORMAL
UNIT DIVISION, %UDIV: 0
WBC # BLD AUTO: 11.3 K/UL (ref 3.6–11)

## 2021-06-23 PROCEDURE — 36415 COLL VENOUS BLD VENIPUNCTURE: CPT

## 2021-06-23 PROCEDURE — 65270000029 HC RM PRIVATE

## 2021-06-23 PROCEDURE — 92610 EVALUATE SWALLOWING FUNCTION: CPT

## 2021-06-23 PROCEDURE — 73560 X-RAY EXAM OF KNEE 1 OR 2: CPT

## 2021-06-23 PROCEDURE — 80048 BASIC METABOLIC PNL TOTAL CA: CPT

## 2021-06-23 PROCEDURE — 74011250637 HC RX REV CODE- 250/637: Performed by: STUDENT IN AN ORGANIZED HEALTH CARE EDUCATION/TRAINING PROGRAM

## 2021-06-23 PROCEDURE — 97161 PT EVAL LOW COMPLEX 20 MIN: CPT

## 2021-06-23 PROCEDURE — 94760 N-INVAS EAR/PLS OXIMETRY 1: CPT

## 2021-06-23 PROCEDURE — 74011250637 HC RX REV CODE- 250/637: Performed by: INTERNAL MEDICINE

## 2021-06-23 PROCEDURE — 85027 COMPLETE CBC AUTOMATED: CPT

## 2021-06-23 PROCEDURE — 77030037877 HC DRSG MEPILEX >48IN BORD MOLN -A

## 2021-06-23 PROCEDURE — 2709999900 HC NON-CHARGEABLE SUPPLY

## 2021-06-23 PROCEDURE — 99222 1ST HOSP IP/OBS MODERATE 55: CPT | Performed by: INTERNAL MEDICINE

## 2021-06-23 PROCEDURE — 74011250636 HC RX REV CODE- 250/636: Performed by: STUDENT IN AN ORGANIZED HEALTH CARE EDUCATION/TRAINING PROGRAM

## 2021-06-23 RX ORDER — POLYETHYLENE GLYCOL 3350 17 G/17G
17 POWDER, FOR SOLUTION ORAL DAILY
Status: DISCONTINUED | OUTPATIENT
Start: 2021-06-23 | End: 2021-06-25 | Stop reason: HOSPADM

## 2021-06-23 RX ORDER — ENOXAPARIN SODIUM 100 MG/ML
30 INJECTION SUBCUTANEOUS DAILY
Status: DISCONTINUED | OUTPATIENT
Start: 2021-06-23 | End: 2021-06-25 | Stop reason: HOSPADM

## 2021-06-23 RX ORDER — ACETAMINOPHEN 500 MG
1000 TABLET ORAL EVERY 8 HOURS
Status: DISCONTINUED | OUTPATIENT
Start: 2021-06-23 | End: 2021-06-25 | Stop reason: HOSPADM

## 2021-06-23 RX ORDER — ACETAMINOPHEN 325 MG/1
650 TABLET ORAL EVERY 6 HOURS
Status: DISCONTINUED | OUTPATIENT
Start: 2021-06-23 | End: 2021-06-23

## 2021-06-23 RX ORDER — FACIAL-BODY WIPES
10 EACH TOPICAL DAILY PRN
Status: DISCONTINUED | OUTPATIENT
Start: 2021-06-24 | End: 2021-06-25 | Stop reason: HOSPADM

## 2021-06-23 RX ORDER — SODIUM CHLORIDE 9 MG/ML
75 INJECTION, SOLUTION INTRAVENOUS AS NEEDED
Status: DISCONTINUED | OUTPATIENT
Start: 2021-06-23 | End: 2021-06-25 | Stop reason: HOSPADM

## 2021-06-23 RX ORDER — FERROUS SULFATE, DRIED 160(50) MG
1 TABLET, EXTENDED RELEASE ORAL
Status: DISCONTINUED | OUTPATIENT
Start: 2021-06-23 | End: 2021-06-25 | Stop reason: HOSPADM

## 2021-06-23 RX ORDER — SODIUM CHLORIDE 0.9 % (FLUSH) 0.9 %
5-40 SYRINGE (ML) INJECTION AS NEEDED
Status: DISCONTINUED | OUTPATIENT
Start: 2021-06-23 | End: 2021-06-25 | Stop reason: HOSPADM

## 2021-06-23 RX ORDER — SODIUM CHLORIDE 9 MG/ML
125 INJECTION, SOLUTION INTRAVENOUS CONTINUOUS
Status: DISCONTINUED | OUTPATIENT
Start: 2021-06-23 | End: 2021-06-23

## 2021-06-23 RX ORDER — NALOXONE HYDROCHLORIDE 0.4 MG/ML
0.4 INJECTION, SOLUTION INTRAMUSCULAR; INTRAVENOUS; SUBCUTANEOUS AS NEEDED
Status: DISCONTINUED | OUTPATIENT
Start: 2021-06-23 | End: 2021-06-25 | Stop reason: HOSPADM

## 2021-06-23 RX ORDER — SODIUM CHLORIDE 0.9 % (FLUSH) 0.9 %
5-40 SYRINGE (ML) INJECTION EVERY 8 HOURS
Status: DISCONTINUED | OUTPATIENT
Start: 2021-06-23 | End: 2021-06-25 | Stop reason: HOSPADM

## 2021-06-23 RX ORDER — OXYCODONE HYDROCHLORIDE 5 MG/1
5 TABLET ORAL
Status: DISCONTINUED | OUTPATIENT
Start: 2021-06-23 | End: 2021-06-25 | Stop reason: HOSPADM

## 2021-06-23 RX ORDER — HYDROMORPHONE HYDROCHLORIDE 1 MG/ML
0.5 INJECTION, SOLUTION INTRAMUSCULAR; INTRAVENOUS; SUBCUTANEOUS
Status: DISCONTINUED | OUTPATIENT
Start: 2021-06-23 | End: 2021-06-23

## 2021-06-23 RX ORDER — AMOXICILLIN 250 MG
1 CAPSULE ORAL 2 TIMES DAILY
Status: DISCONTINUED | OUTPATIENT
Start: 2021-06-23 | End: 2021-06-25 | Stop reason: HOSPADM

## 2021-06-23 RX ADMIN — Medication 10 ML: at 21:59

## 2021-06-23 RX ADMIN — Medication 10 ML: at 00:08

## 2021-06-23 RX ADMIN — Medication 1 TABLET: at 09:57

## 2021-06-23 RX ADMIN — Medication 10 ML: at 22:00

## 2021-06-23 RX ADMIN — PANTOPRAZOLE SODIUM 40 MG: 40 TABLET, DELAYED RELEASE ORAL at 17:10

## 2021-06-23 RX ADMIN — LEVOTHYROXINE SODIUM 50 MCG: 0.05 TABLET ORAL at 08:31

## 2021-06-23 RX ADMIN — ACETAMINOPHEN 1000 MG: 500 TABLET, FILM COATED ORAL at 22:00

## 2021-06-23 RX ADMIN — MORPHINE SULFATE 2 MG: 2 INJECTION, SOLUTION INTRAMUSCULAR; INTRAVENOUS at 17:10

## 2021-06-23 RX ADMIN — DOCUSATE SODIUM 50MG AND SENNOSIDES 8.6MG 1 TABLET: 8.6; 5 TABLET, FILM COATED ORAL at 09:57

## 2021-06-23 RX ADMIN — HYDRALAZINE HYDROCHLORIDE 5 MG: 10 TABLET, FILM COATED ORAL at 17:10

## 2021-06-23 RX ADMIN — Medication 10 ML: at 08:31

## 2021-06-23 RX ADMIN — Medication 1 TABLET: at 17:10

## 2021-06-23 RX ADMIN — HYDRALAZINE HYDROCHLORIDE 5 MG: 10 TABLET, FILM COATED ORAL at 09:57

## 2021-06-23 RX ADMIN — ACETAMINOPHEN 650 MG: 325 TABLET ORAL at 08:30

## 2021-06-23 RX ADMIN — PANTOPRAZOLE SODIUM 40 MG: 40 TABLET, DELAYED RELEASE ORAL at 08:31

## 2021-06-23 RX ADMIN — ACETAMINOPHEN 1000 MG: 500 TABLET, FILM COATED ORAL at 17:10

## 2021-06-23 RX ADMIN — VANCOMYCIN HYDROCHLORIDE 1000 MG: 1 INJECTION, POWDER, LYOPHILIZED, FOR SOLUTION INTRAVENOUS at 00:05

## 2021-06-23 RX ADMIN — DOCUSATE SODIUM 50MG AND SENNOSIDES 8.6MG 1 TABLET: 8.6; 5 TABLET, FILM COATED ORAL at 17:10

## 2021-06-23 NOTE — PROGRESS NOTES
Problem: Dysphagia (Adult)  Goal: *Acute Goals and Plan of Care (Insert Text)  Description: Swallowing goals initiated 6-23-21:  1) tolerate mech soft, thins without s/s aspiration by 6-25-21  Outcome: Not Progressing Towards Goal   SPEECH [address] EVALUATION  Patient: Elyssa Romo (4 y.o. female)  Date: 6/23/2021  Primary Diagnosis: Femur fracture (HCC) [S72.90XA]  Procedure(s) (LRB):  FEMUR DISTAL OPEN REDUCTION INTERNAL FIXATION (Right) 1 Day Post-Op   Precautions: aspiration       ASSESSMENT :  Based on the objective data described below, the patient presents with mild pharyngeal dysphagia with thins with delayed reaction cough. Suspect this is from recent intubation for surgery with this tiny 79 yo woman. Admitted 6-22-21 with GLF with R femur fracture. Reduction done in ER then fixation surgery. PMH: HTN< hypothyroid,  arthritis,  GERD, arhymia, anxiety, TKR,  GI bleed 4/2021. Jaycob Gomez Patient will benefit from skilled intervention to address the above impairments. Patients rehabilitation potential is considered to be Fair     PLAN :  Recommendations and Planned Interventions:  Start mech soft, nectars. Frequency/Duration: Patient will be followed by speech-language pathology 2 times a week to address goals. Discharge Recommendations: To Be Determined     SUBJECTIVE:   Patient stated Darreld Dun is my coffee? Why do I only get a half a cup? . OBJECTIVE:     Past Medical History:   Diagnosis Date    Arthritis     Hypertension     Hypothyroidism      Past Surgical History:   Procedure Laterality Date    HX CHOLECYSTECTOMY      HX GYN      partial hysterectomy, uterine prolapse    HX HEENT      cataract    HX ORTHOPAEDIC Right     knee replacement     Prior Level of Function/Home Situation: [facility]     Diet prior to admission: regular, thins? Current Diet:  NPO x sips and meds. Cognitive and Communication Status:  Neurologic State:  (pleasantly confused.  talking about putting jelly in the freezer)  Orientation Level: Oriented to person, Disoriented to place, Disoriented to situation, Disoriented to time  Cognition: Poor safety awareness, No command following, Decreased attention/concentration, Memory loss, Impulsive  Perception: Appears intact  Perseveration: No perseveration noted  Safety/Judgement: Lack of insight into deficits  Oral Assessment:  Oral Assessment  Labial: No impairment  Dentition: Natural;Limited (50% of teeth. mostly frontal)  Oral Hygiene: difficult to view due to confusion  P.O. Trials:  Patient Position: upright in bed  Vocal quality prior to P.O.: No impairment  Consistency Presented: Thin liquid;Puree; Solid; Nectar thick liquid  How Presented: Self-fed/presented;SLP-fed/presented;Spoon;Straw   ORAL PHASE:  Bolus Acceptance: No impairment  Bolus Formation/Control: No impairment     Propulsion: No impairment  Oral Residue: None  PHARYNGEAL PHASE:   Initiation of Swallow: No impairment  Laryngeal Elevation: Functional  Aspiration Signs/Symptoms: Change vocal quality;Clear throat (frequently s/p thins)  Pharyngeal Phase Characteristics: COUGH delayed and weak. Effective Modifications: None                  NOMS:   The NOMS functional outcome measure was used to quantify this patient's level of swallowing impairment. Based on the NOMS, the patient was determined to be at level 4 for swallow function       NOMS Swallowing Levels:  Level 1 (CN): NPO  Level 2 (CM): NPO but takes consistency in therapy  Level 3 (CL): Takes less than 50% of nutrition p.o. and continues with nonoral feedings; and/or safe with mod cues; and/or max diet restriction  Level 4 (CK):  Safe swallow but needs mod cues; and/or mod diet restriction; and/or still requires some nonoral feeding/supplements  Level 5 (CJ): Safe swallow with min diet restriction; and/or needs min cues  Level 6 (CI): Independent with p.o.; rare cues; usually self cues; may need to avoid some foods or needs extra time  Level 7 ([address]): Independent for all p.o.  ALAN. (2003). National Outcomes Measurement System (NOMS): Adult Speech-Language Pathology User's Guide. Pain:  Pain Scale 1: Numeric (0 - 10)  Pain Intensity 1: 0       After treatment:   Patient left in no apparent distress in bed and Nursing notified    COMMUNICATION/EDUCATION:   Patient was educated regarding her deficit(s) of dysphagia  as this relates to her diagnosis of femur fx. She demonstrated Guarded understanding as evidenced by dementia. The patient's plan of care including recommendations, planned interventions, and recommended diet changes were discussed with: Registered nurse and palliative care . Patient/family have participated as able in goal setting and plan of care. Patient/family agree to work toward stated goals and plan of care.     Thank you for this referral.  Ramiro Weathers SLP  Time Calculation: 20 mins

## 2021-06-23 NOTE — PROGRESS NOTES
Shift Change:    Bedside and Verbal shift change report given to David RN (oncoming nurse) by Nickie Santamaria RN (offgoing nurse). Report included the following information SBAR, Kardex, OR Summary, Procedure Summary, Intake/Output, MAR and Recent Results. 1015: Patient confused, agitated, and verbally aggressive. Pulled out PIV. 1025: Patient settled down and has been resting quietly. May have startled her when waking her up.     1710: Patient appears to be in pain. Grimacing and tearful. Stated Judie  it hurts. \" PRN IV Morphine given. Shift Change:    Bedside and Verbal shift change report given to Barb Marmolejo RN (oncoming nurse) by Evangelina Schaumann, RN (offgoing nurse). Report included the following information SBAR, Kardex, OR Summary, Procedure Summary, Intake/Output, MAR and Recent Results.

## 2021-06-23 NOTE — PROGRESS NOTES
Problem: Mobility Impaired (Adult and Pediatric)  Goal: *Acute Goals and Plan of Care (Insert Text)  Description: FUNCTIONAL STATUS PRIOR TO ADMISSION: Pt confused, oriented only to self and unable to answer questions re: PLOF. Per chart, pt was primarily w/c bound but transferring independently    [address] [place] [address] Avenue: Pt unable to report d/t confusion- per chart Pt lived in [place]    Physical Therapy Goals  Initiated 6/23/2021  1. Patient will move from supine to sit and sit to supine  in bed with moderate assistance  within 7 day(s). 2.  Patient will transfer from bed to chair and chair to bed with moderate assistance  using the least restrictive device within 7 day(s). 3.  Patient will perform sit to stand with moderate assistance  within 7 day(s).    6/23/2021 1349 by Luli Vilchis  Outcome: Not Progressing Towards Goal    PHYSICAL THERAPY EVALUATION  Patient: Dayana Servin (54 y.o. female)  Date: 6/23/2021  Primary Diagnosis: Femur fracture ([facility] [address] [S72.90XA]  Procedure(s) (LRB):  FEMUR DISTAL OPEN REDUCTION INTERNAL FIXATION (Right) 1 Day Post-Op   Precautions: weightbearing for transfers only       ASSESSMENT  Based on the objective data described below, the patient presents with pain limiting function and significant confusion limiting participation in medical care and therapy. Pt unable to respond to questioning re:PLOF and have very tangential and nonsensical comments during session. Per RN, pt screams out with attempts to move the bed or reposition. Pt able to wiggle B toes on command and perform L knee flexion however, pt yelled out with attempts to move or touch LLE. Additional mobility defered d/t pain and pt's response to attempts at movement. Dependently scooted pt up in bed and elevated HOB with Total A x 2. Acute PT will continue to follow pt while she remains in the acute setting. Rec SNF placement once medically stable.     Current Level of Function Impacting Discharge (mobility/balance): Total Care    Functional Outcome Measure: The patient scored 0/100 on the Barthel Index outcome measure which is indicative of 100% functional impairment. Other factors to consider for discharge:      Patient will benefit from skilled therapy intervention to address the above noted impairments. PLAN :  Recommendations and Planned Interventions: bed mobility training, transfer training, therapeutic exercises, patient and family training/education, and therapeutic activities      Frequency/Duration: Patient will be followed by physical therapy:  daily to address goals. Recommendation for discharge: (in order for the patient to meet his/her long term goals)  Therapy up to 5 days/week in SNF setting    This discharge recommendation:  A follow-up discussion with the attending provider and/or case management is planned    IF patient discharges home will need the following DME: to be determined (TBD)         SUBJECTIVE:   Patient stated I will not be bothered.     OBJECTIVE DATA SUMMARY:   HISTORY:    Past Medical History:   Diagnosis Date    Arthritis     Hypertension     Hypothyroidism      Past Surgical History:   Procedure Laterality Date    HX CHOLECYSTECTOMY      HX GYN      partial hysterectomy, uterine prolapse    HX HEENT      cataract    HX ORTHOPAEDIC Right     knee replacement       Personal factors and/or comorbidities impacting plan of care:          EXAMINATION/PRESENTATION/DECISION MAKING:   Critical Behavior:  Neurologic State: Alert, Eyes open spontaneously  Orientation Level: Disoriented X4  Cognition: Poor safety awareness, No command following, Decreased attention/concentration, Memory loss, Impulsive     Hearing:     Skin:  Defer to RN notes  Edema: Defer to RN notes  Range Of Motion:  AROM: Grossly decreased, non-functional           PROM: Grossly decreased, non-functional           Strength:    Strength: Grossly decreased, non-functional                    Tone & Sensation:   Tone: Normal              Sensation: Intact               Coordination:  Coordination: Grossly decreased, non-functional  Vision:      Functional Mobility:  Unable to assess d/t pt's presentation                             Functional Measure:  Barthel Index:    Bathin  Bladder: 0  Bowels: 0  Groomin  Dressin  Feedin  Mobility: 0  Stairs: 0  Toilet Use: 0  Transfer (Bed to Chair and Back): 0  Total: 0/100       The Barthel ADL Index: Guidelines  1. The index should be used as a record of what a patient does, not as a record of what a patient could do. 2. The main aim is to establish degree of independence from any help, physical or verbal, however minor and for whatever reason. 3. The need for supervision renders the patient not independent. 4. A patient's performance should be established using the best available evidence. Asking the patient, friends/relatives and nurses are the usual sources, but direct observation and common sense are also important. However direct testing is not needed. 5. Usually the patient's performance over the preceding 24-48 hours is important, but occasionally longer periods will be relevant. 6. Middle categories imply that the patient supplies over 50 per cent of the effort. 7. Use of aids to be independent is allowed. Aristeo Avendano, Barthel, D.W. (8172). Functional evaluation: the Barthel Index. [facility] (14)2. RUDI Whitley, Abril Gonsales., Reza Curtis., Jerrica Spence, [address] [place] [address] (). Measuring the change indisability after inpatient rehabilitation; comparison of the responsiveness of the Barthel Index and Functional [place] Measure. Journal of Neurology, Neurosurgery, and Psychiatry, 66(4), 438-836. Merced Simth, N.[place].A, PHUC Jesus, & Suzanne Ricardo MDominickA. (2004.) Assessment of post-stroke quality of life in cost-effectiveness studies: The usefulness of the Barthel Index and the EuroQoL-5D.  [facility], 13, 358-31 Physical Therapy Evaluation Charge Determination   History Examination Presentation Decision-Making   HIGH Complexity :3+ comorbidities / personal factors will impact the outcome/ POC  LOW Complexity : 1-2 Standardized tests and measures addressing body structure, function, activity limitation and / or participation in recreation  MEDIUM Complexity : Evolving with changing characteristics  LOW Complexity : FOTO score of       Based on the above components, the patient evaluation is determined to be of the following complexity level: LOW     Pain Rating:  Unrated pain but yelled out with attempts to move RLE    Activity Tolerance:   Poor    After treatment patient left in no apparent distress:   Supine in bed and Call bell within reach    COMMUNICATION/EDUCATION:   The patients plan of care was discussed with: Rehabilitation technician. Fall prevention education was provided and the patient/caregiver indicated understanding., Patient/family have participated as able in goal setting and plan of care. , and Patient/family agree to work toward stated goals and plan of care.     Thank you for this referral.  Guido Marcano PT, DPT   Time Calculation: 10 mins

## 2021-06-23 NOTE — WOUND CARE
Wound Consult:  New consult Visit. Chart reviewed. Consulted for multiple wounds due to fall at home. Amina Arora Spoke with patients nurse,  Izzy Pabon. PSR at bedside to help with turning and repositioning  Patient is resting on a hillrom bed with versacare mattress. Heels off loaded with hell boots/pillows. Patient is disoriented and confused; requires 2 assists to move side to side in bed. South score 13  Assessment:POA all wounds below  Right heel-blanchable redness, boggy, fragile skin  Left heel-small pinpoint scab, blanchable redness, boggy  Right dorsal foot-2x2.6x0.1cm partial thickness, red, slight bloody drainage, erythema- skin tear from fall at home. Left dorsal foot- 3x4x0.1cm appears previous wound with slight resurfacing, extremely dry, blanchable redness thru wound bed and surrounding tissue. Sacrum-redness/blanching, fragile skin. Left  2nd toe- nail no longer in place, open nail bed, dry no drainage. Forehead- dry flaking skin- skin intact, no drainage. Right thigh surgical dressings dry and intact  Left lower leg- ecchymosis/purple/red, skin intact. 9x5cm    Treatment:  Sacral foam dressing to sacrum  Right and left dorsal foot wounds- cleanses with NSS foam dressing applied. Hell boots bilateral for offloading  Petroleum jelly to lower legs. Wound Recommendations:  Right and left dorsal foot wounds cleanse with NSS and apply foam dressing every 3 days. Sacral foam dressing to sacrum for protection. Skin Care / PI Prevention Recommendations:  1. Minimize friction/shear: minimize layers of linen/pads under patient. 2. Off load pressure/reposition:   turn and reposition approximately every 2 hours; float heels with pillows or use off loading heel boots; waffle cushion for sitting; position wedge. 3. Manage Moisture - keep skin folds dry; incontinence skin care with incontinence wipes; Zinc guard barrier ointment; purewick in use to help contain urine.   4. Continue to monitor nutrition, pain, and skin risk scale, and skin assessment. Plan:  Spoke with Dr. Lynch Memos regarding findings and proposed orders for treatment. We will continue to reassess and as needed. Please re-consult should concerns arise despite continued skin/PI prevention measures.     Hakan Gamez / [address] [facility] Office [phone]

## 2021-06-23 NOTE — PROGRESS NOTES
Provided pastoral care visit to [facility] patient. (very confused)   Did not include sacramental care.       Noelle Barry

## 2021-06-23 NOTE — ROUTINE PROCESS
TRANSFER - OUT REPORT:    Verbal report given to PATRICIA Dean(name) on Manual Purpura  being transferred to [facility](unit) for routine post - op       Report consisted of patients Situation, Background, Assessment and   Recommendations(SBAR). Information from the following report(s) SBAR and MAR was reviewed with the receiving nurse. Opportunity for questions and clarification was provided.       Patient transported with:   O2 @ 3 liters  Registered Nurse

## 2021-06-23 NOTE — ANESTHESIA POSTPROCEDURE EVALUATION
Procedure(s): FEMUR DISTAL OPEN REDUCTION INTERNAL FIXATION. spinal    Anesthesia Post Evaluation      Multimodal analgesia: multimodal analgesia not used between 6 hours prior to anesthesia start to PACU discharge  Patient location during evaluation: PACU  Patient participation: complete - patient participated  Level of consciousness: awake  Pain management: adequate  Airway patency: patent  Anesthetic complications: no  Cardiovascular status: acceptable, blood pressure returned to baseline and hemodynamically stable  Respiratory status: acceptable  Hydration status: acceptable  Post anesthesia nausea and vomiting:  controlled      INITIAL Post-op Vital signs:   Vitals Value Taken Time   /60 06/22/21 2037   Temp 36.5 °C (97.7 °F) 06/22/21 2035   Pulse 88 06/22/21 2039   Resp 16 06/22/21 2039   SpO2 97 % 06/22/21 2040   Vitals shown include unvalidated device data.

## 2021-06-23 NOTE — PROGRESS NOTES
Admission questions not completed at this time. Patient appears confused.  Nurse informed admission nurse that patient \"was confused and help from the family was needed\"

## 2021-06-23 NOTE — PROGRESS NOTES
[address] Adult  Hospitalist Group                                                                                          Hospitalist Progress Note  Fritz Alvarez MD        Date of Service:  2021  NAME:  Ki Morfin  :  1930  MRN:  [id number]      Admission Summary:       Interval history / Subjective:    pleasantly confused currently, but nursing reports patient has been confused, agitated and verbally aggressive. Assessment & Plan:     Right femur fracture  -fracture reduced in ED, ortho consulted, s/p ORIF   -tramadol, IV morphine prn for pain  -CM for discharge planning  -plan SNF     Acute blood loss anemia  -likely from surgery  -s/p 1 unit PRBC    Hypothyroidism  -cont LT4     GERD  -cont protonix     HTN  -cont home hydralazine    Code status: DNR  DVT prophylaxis: [facility] Problems  Date Reviewed: 2021        Codes Class Noted POA    Femur fracture ([facility] [address] ICD-10-CM: J52.19ED  ICD-9-CM: 821.00  2021 Unknown                Review of Systems:   A comprehensive review of systems was negative except for that written in the HPI. Vital Signs:    Last 24hrs VS reviewed since prior progress note. Most recent are:  Visit Vitals  /79 (BP 1 Location: Right arm, BP Patient Position: At rest)   Pulse 75   Temp 97.5 °F (36.4 °C)   Resp 16   Ht 4' 8\" (1.422 m)   Wt 44 kg (96 lb 14.4 oz)   SpO2 95%   BMI 21.72 kg/m²         Intake/Output Summary (Last 24 hours) at 2021 1155  Last data filed at 2021 1145  Gross per 24 hour   Intake 5093.85 ml   Output 850 ml   Net 4243. 85 ml        Physical Examination:             Constitutional:  No acute distress   ENT:  Oral mucosa moist, oropharynx benign. Resp:  CTA bilaterally. No wheezing/rhonchi/rales. No accessory muscle use   CV:  Regular rhythm, normal rate, no murmurs, gallops, rubs    GI:  Soft, non distended, non tender.  normoactive bowel sounds, no hepatosplenomegaly     Musculoskeletal: No edema, warm, 2+ pulses throughout    Neurologic:  Moves all extremities. AAOx3, CN II-XII reviewed     Psych:  No insight       Data Review:    Review and/or order of clinical lab test      Labs:     Recent Labs     06/23/21 0218 06/22/21 1832   WBC 11.3* 9.8   HGB 9.3* 6.3*   HCT 30.0* 20.4*    204     Recent Labs     06/23/21  0218 06/22/21  1832 06/22/21  0607    140 137   K 4.6 4.9 4.3    109* 104   CO2 24 27 28   BUN 16 16 16   CREA 0.51* 0.40* 0.65   * 112* 127*   CA 7.2* 6.9* 7.5*     Recent Labs     06/22/21  0607   ALT 19   AP 89   TBILI 0.3   TP 5.2*   ALB 2.1*   GLOB 3.1     No results for input(s): INR, PTP, APTT, INREXT in the last 72 hours. No results for input(s): FE, TIBC, PSAT, FERR in the last 72 hours. Lab Results   Component Value Date/Time    Folate 6.8 04/18/2021 11:26 AM      No results for input(s): PH, PCO2, PO2 in the last 72 hours. No results for input(s): CPK, CKNDX, TROIQ in the last 72 hours.     No lab exists for component: CPKMB  No results found for: CHOL, CHOLX, CHLST, CHOLV, HDL, HDLP, LDL, LDLC, DLDLP, TGLX, TRIGL, TRIGP, CHHD, CHHDX  Lab Results   Component Value Date/Time    Glucose (POC) 125 (H) 06/22/2021 06:17 PM    Glucose (POC) 113 (H) 04/21/2021 04:06 PM    Glucose (POC) 127 (H) 04/21/2021 12:30 PM    Glucose (POC) 104 (H) 04/20/2021 09:28 PM    Glucose (POC) 107 (H) 04/20/2021 05:12 PM     Lab Results   Component Value Date/Time    Color YELLOW/STRAW 04/23/2021 04:09 AM    Appearance CLEAR 04/23/2021 04:09 AM    Specific gravity 1.017 04/23/2021 04:09 AM    Specific gravity 1.015 04/18/2021 10:00 AM    pH (UA) 5.0 04/23/2021 04:09 AM    Protein Negative 04/23/2021 04:09 AM    Glucose Negative 04/23/2021 04:09 AM    Ketone Negative 04/23/2021 04:09 AM    Bilirubin Negative 04/23/2021 04:09 AM    Urobilinogen 0.2 04/23/2021 04:09 AM    Nitrites Negative 04/23/2021 04:09 AM    Leukocyte Esterase SMALL (A) 04/23/2021 04:09 AM    Epithelial cells FEW 04/23/2021 04:09 AM    Bacteria Negative 04/23/2021 04:09 AM    WBC 0-4 04/23/2021 04:09 AM    RBC 0-5 04/23/2021 04:09 AM         Medications Reviewed:     Current Facility-Administered Medications   Medication Dose Route Frequency    enoxaparin (LOVENOX) injection 30 mg  30 mg SubCUTAneous DAILY    0.9% sodium chloride infusion  125 mL/hr IntraVENous CONTINUOUS    sodium chloride (NS) flush 5-40 mL  5-40 mL IntraVENous Q8H    sodium chloride (NS) flush 5-40 mL  5-40 mL IntraVENous PRN    acetaminophen (TYLENOL) tablet 650 mg  650 mg Oral Q6H    naloxone (NARCAN) injection 0.4 mg  0.4 mg IntraVENous PRN    calcium-vitamin D (OS-SAADIA +D3) 500 mg-200 unit per tablet 1 Tablet  1 Tablet Oral TID WITH MEALS    senna-docusate (PERICOLACE) 8.6-50 mg per tablet 1 Tablet  1 Tablet Oral BID    polyethylene glycol (MIRALAX) packet 17 g  17 g Oral DAILY    [START ON 6/24/2021] bisacodyL (DULCOLAX) suppository 10 mg  10 mg Rectal DAILY PRN    oxyCODONE IR (ROXICODONE) tablet 5 mg  5 mg Oral Q4H PRN    HYDROmorphone (DILAUDID) syringe 0.5 mg  0.5 mg IntraVENous Q4H PRN    sodium chloride (NS) flush 5-40 mL  5-40 mL IntraVENous Q8H    sodium chloride (NS) flush 5-40 mL  5-40 mL IntraVENous PRN    acetaminophen (TYLENOL) tablet 650 mg  650 mg Oral Q6H PRN    Or    acetaminophen (TYLENOL) suppository 650 mg  650 mg Rectal Q6H PRN    polyethylene glycol (MIRALAX) packet 17 g  17 g Oral DAILY PRN    ondansetron (ZOFRAN ODT) tablet 4 mg  4 mg Oral Q8H PRN    Or    ondansetron (ZOFRAN) injection 4 mg  4 mg IntraVENous Q6H PRN    0.9% sodium chloride infusion  75 mL/hr IntraVENous CONTINUOUS    hydrALAZINE (APRESOLINE) tablet 5 mg  5 mg Oral BID    levothyroxine (SYNTHROID) tablet 50 mcg  50 mcg Oral ACB    pantoprazole (PROTONIX) tablet 40 mg  40 mg Oral ACB&D    traMADoL (ULTRAM) tablet 50 mg  50 mg Oral Q6H PRN    morphine injection 2 mg  2 mg IntraVENous Q4H PRN    0.9% sodium chloride infusion 250 mL  250 mL IntraVENous PRN     ______________________________________________________________________  EXPECTED LENGTH OF STAY: 2d 19h  ACTUAL LENGTH OF STAY:          1                 Ashlie Shaikh MD

## 2021-06-23 NOTE — PROGRESS NOTES
6/23/2021  3:11 PM  Reason for Admission: Emergency- Surgery required. ICD-10-CM ICD-9-CM    1. Closed displaced comminuted fracture of shaft of right femur, initial encounter ([facility] [address]  C28.908W 821.01    2. Underweight  R63.6 783.22    3. Late onset Alzheimer's dementia without behavioral disturbance (HCC)  G30.1 331.0     F02.80 294.10    4. Debility  R53.81 799.3            Assessment:   [x]In person with pt   []Via p/c with pt   [x]With family member in person. Who/Relation: Sp Contreras / REUBEN    []With family member via p/c. Who/Relation:   []Chart Review    RUR: 17%  Risk Level: [x]Low []Moderate []High  Value-based purchasing: [] Yes [x] No  Bundle patient: [] Yes [x] No   Specify:     Advance Directive: DNR. [] No AD on file. [x] AD on file. [] Current AD not on file. Copy requested. [] Requests AD, and referral submitted to [facility]. Healthcare Decision Maker:   Primary Decision Maker: Jonelle Chua Child - [phone]    Primary Decision Maker: Valentina Licona - Daughter-in-Law - [phone]        Assessment:    Age:  80    Sex: [] Male [x]Female     Residency: []Private residence []Apartment [x]Assisted Living ([facility]) []LTC []Other:     Lives With: []With spouse []Other family members []Underage children []Alone []Care provider [x]Other: Nursing care frequently checks in on pt during the day.     Prior functioning:  []Independent with ADLs and iADLS [x]Dependent with ADLs and iADLs []Partial dependence, Specify:     Prior DME required:  []None []RW []Cane []Crutches []Bedside commode []CPAP []Home O2 (Liter/Provider: ) []Nebulizer   [x]Shower Chair [x]Wheelchair [x]Hospital Bed []Mode []Stair lift []Rollator []Other:      Rehab history: []None []Outpatient PT []Home Health (Provider/Date: ) []SNF (Provider/Date: ) []IPR (Provider/Date: ) []LTC (Provider/Date: ) [x]Hospice (Provider/Date: At Home Care / Current)  []Other:     Discharge Concerns: [x]Yes []No []Unknown   Describe: See below    Comments: Insurer:   Insurance Information                [address] Alberto Cartwright CHOICE PPO/PFFS Phone: [phone]    Subscriber: Silvestre Zarate Subscriber#: [id number]    Group#: [id number] Precert#:           PCP: Mahamed Odell   Address: [address] [place] [address] [address]   Phone number: [phone]   Current patient: [x]Yes []No   Approximate date of last visit: Ongoing at [facility]   Access to virtual PCP visits: []Yes [x]No    Pharmacy: [facility]    DC Transport:        Transition of care plan:    [x]Unable to determine at this time. Awaiting clinical progress, and disposition recommendations. Pt's DIL was unsure what the best course of action is for pt at discharge - resume hospice services at [facility] or for pt to receive rehab. DIL expressed concern that pt receives no PT at the [facility] currently. CM provided DIL with educated of rehab vs hospice. DIL reported that pt's son is currently on a train (works for the [employer]) and will not have access to a phone until tomorrow. Palliative consult submitted, and requested they call pt's son, Ariela Hyman, tomorrow. CM spoke with  at [facility] who reported that pt is able to return to them under hospice, or return to them and pt receive PT/OT pending family decisions. [] Home with outpatient follow-up    [] Home with Outpatient PT and outpatient follow-up   Pt aware of OP appt?  []Yes, Provider:   []Not scheduled   Transport provider:     [] Home with family assistance as needed and outpatient follow-up   Family able to assist:    Schedule:  Transport provider:      [] Home with Home Health   - Provider:     []SNF/IPR   -[]Preferences given:   []Listing provided and preferences requested   -Status: []Pending []Accepted:    -Auth required: []Yes []No    -Auth initiated date:   -3 midnight stay required: []Yes []No  Date satisfied:     [] Home with Hospice   -Provider:     [] [facility] information provided. [] Other:      Shruthi Malik MA    Care Management Interventions  PCP Verified by CM: Yes Gurpreet Correa)  Palliative Care Criteria Met (RRAT>21 & CHF Dx)?: No  Palliative Consult Recommended?: Yes  Mode of Transport at Discharge:  Other (see comment) (Family)  Transition of Care Consult (CM Consult): Discharge Planning  Discharge Durable Medical Equipment: No  Physical Therapy Consult: Yes  Occupational Therapy Consult: Yes  Speech Therapy Consult: Yes  Current Support Network: Assisted Living ([facility])  Confirm Follow Up Transport: Other (see comment) (BLS)  The Plan for Transition of Care is Related to the Following Treatment Goals : Femur fracture  The Patient and/or Patient Representative was Provided with a Choice of Provider and Agrees with the Discharge Plan?: (S) Yes  Name of the Patient Representative Who was Provided with a Choice of Provider and Agrees with the Discharge Plan: Self/family  Freedom of Choice List was Provided with Basic Dialogue that Supports the Patient's Individualized Plan of Care/Goals, Treatment Preferences and Shares the Quality Data Associated with the Providers?: (S) Yes  Discharge Location  Discharge Placement: Assisted Living

## 2021-06-23 NOTE — CONSULTS
Palliative Medicine Consult  Todd: [phone] ([phone])    Patient Name: Thomas Cifuentes  YOB: 1930    Date of Initial Consult: 6/23/2021  Reason for Consult: care decisions  Requesting Provider: Dr. Yanet Jama  Primary Care Physician: Jenn Spencer DO     SUMMARY:   Thomas Cifuentes is a 80 y.o. with a past history of dementia, 2nd degree AV block, hypothyroidism, hearing loss, R TKR, GI bleed April 2021, who was admitted on 6/22/2021 from home with a diagnosis of fall with R femur fracture. Current medical issues leading to Palliative Medicine involvement include: Patient with new distal R femur fracture s/p ORIF 6/22/21, dementia, hearing impaired, debility. We are asked to discuss care goals with patient's son on 6/24/21. Patient lives at [facility]. Darryl Brush H: [phone]    DNR order on chart  No ACP documents on chart     PALLIATIVE DIAGNOSES:   1. R femur fx, s/p ORIF on 6/22/21  2. Post op pain  3. Anemia, acute blood loss, (transfused 1 unit 6/22)  4. Dementia, unsure of baseline function, more info needed  5. Skin breakdown POA (R and L dorsal foot, sacrum)  6. Debility  7. Hearing impaired  8. Dysphagia ? Post intubation inflammation  9. hypoalbuminemia       PLAN:   1. Speech therapy eval pending.  (diet/ pills held when patient had trouble swallowing pills earlier today)  1. Patient is hungry, asking for coffee and applesauce. 2. Pain - no nonverbal signs of pain at this time. Patient has not gotten any pain medication (other than one tylenol tablet) since surgery. 1. Continue scheduled tylenol 1000mg PO Q8hrs- give dose now since noon dose held  2. Recommend simplify regimen - d/c prn dilaudid and leave PRN morphine order 2mg IV Q4hr PRN (use for pain not controlled by oral meds)  3. Tramadol 50mg PO Q6hr PRN pain not controlled by tylenol  4. If patient has further episodes of agitation, consider pain as cause. 3. PT/OT consults pending.   4. Will plan to contact patient's son tomorrow, provide update/ discuss care goals. Dementia may limit her ability to participate in skilled rehab/ await PT eval.   5. Initial consult note routed to primary continuity provider and/or primary health care team members  6. Communicated plan of care with: Palliative [facility] Team     GOALS OF CARE / TREATMENT PREFERENCES:     GOALS OF CARE:  Patient/Health Care Proxy Stated Goals: Rehabilitation    TREATMENT PREFERENCES:   Code Status: DNR    Advance Care Planning:  [] [facility] Interdisciplinary Team has updated the ACP Navigator with Health Care Decision Maker and Patient Capacity      Primary Decision Maker: La Damon - [phone]    Primary Decision Maker: Paty Ford  Daughter-in-Law - [phone]  Advance Care Planning 4/19/2021   Confirm Advance Directive None       Medical Interventions: Limited additional interventions     Other Instructions: Other:    As far as possible, the palliative care team has discussed with patient / health care proxy about goals of care / treatment preferences for patient. HISTORY:     History obtained from:   chart  CHIEF COMPLAINT: fall out of bed with leg fracture    HPI/SUBJECTIVE:    The patient is:   [x] Verbal and participatory  [] Non-participatory due to:     80year old female admitted with fall out of bed resulting in leg fracture. She is a poor historian and unable to supply history.         Clinical Pain Assessment (nonverbal scale for severity on nonverbal patients):   Clinical Pain Assessment  Severity: 0     Activity (Movement): Lying quietly, normal position    Duration: for how long has pt been experiencing pain (e.g., 2 days, 1 month, years)  Frequency: how often pain is an issue (e.g., several times per day, once every few days, constant)     FUNCTIONAL ASSESSMENT:     Palliative Performance Scale (PPS):  PPS: 30       PSYCHOSOCIAL/SPIRITUAL SCREENING:     Palliative IDT has assessed this patient for cultural preferences / practices and a referral made as appropriate to needs (Cultural Services, Patient Advocacy, Ethics, etc.)    Any spiritual / Rastafari concerns:  [] Yes /  [x] No    Caregiver Burnout:  [] Yes /  [x] No /  [] No Caregiver Present      Anticipatory grief assessment:   [x] Normal  / [] Maladaptive       ESAS Anxiety: Anxiety: 0    ESAS Depression: Depression: 0        REVIEW OF SYSTEMS:     Positive and pertinent negative findings in ROS are noted above in HPI. The following systems were [x] reviewed / [] unable to be reviewed as noted in HPI  Other findings are noted below. Systems: constitutional, ears/nose/mouth/throat, respiratory, gastrointestinal, genitourinary, musculoskeletal, integumentary, neurologic, psychiatric, endocrine. Positive findings noted below. Modified ESAS Completed by: provider   Fatigue: 0 Drowsiness: 0   Depression: 0 Pain: 0   Anxiety: 0 Nausea: 0   Anorexia: 0 Dyspnea: 0     Constipation: No     Stool Occurrence(s): 0        PHYSICAL EXAM:     From RN flowsheet:  Wt Readings from Last 3 Encounters:   06/22/21 44 kg (96 lb 14.4 oz)   04/20/21 36.3 kg (80 lb)     Blood pressure 121/79, pulse 75, temperature 97.5 °F (36.4 °C), resp. rate 16, height 4' 8\" (1.422 m), weight 44 kg (96 lb 14.4 oz), SpO2 95 %. Pain Scale 1: Numeric (0 - 10)  Pain Intensity 1: 0     Pain Location 1: Leg        Pain Intervention(s) 1: Medication (see MAR) (immobilizing leg )  Last bowel movement, if known:     Constitutional: awake, alert  Listening to TV with speaker to her ear. Eyes: pupils equal, anicteric  ENMT: no nasal discharge, moist mucous membranes  Cardiovascular: regular rhythm, distal pulses intact  Respiratory: breathing not labored, symmetric  Gastrointestinal: soft non-tender, +bowel sounds  Speech is fluent. Insight is diminished.   Pleasant, engages in conversation but many non sequitors  Not able to follow, often starts talking about unrelated topics       HISTORY:     Active Problems:    Femur fracture ([address] (6/22/2021)      Past Medical History:   Diagnosis Date    Arthritis     Hypertension     Hypothyroidism       Past Surgical History:   Procedure Laterality Date    HX CHOLECYSTECTOMY      HX GYN      partial hysterectomy, uterine prolapse    HX HEENT      cataract    HX ORTHOPAEDIC Right     knee replacement      Family History   Problem Relation Age of Onset    Heart Disease Neg Hx       History reviewed, no pertinent family history.   Social History     Tobacco Use    Smoking status: Never Smoker    Smokeless tobacco: Never Used   Substance Use Topics    Alcohol use: Not Currently     Allergies   Allergen Reactions    Pcn [Penicillins] Unknown (comments)    Statins-Hmg-Coa Reductase Inhibitors Unknown (comments)    Warfarin Unknown (comments)      Current Facility-Administered Medications   Medication Dose Route Frequency    enoxaparin (LOVENOX) injection 30 mg  30 mg SubCUTAneous DAILY    0.9% sodium chloride infusion  125 mL/hr IntraVENous CONTINUOUS    sodium chloride (NS) flush 5-40 mL  5-40 mL IntraVENous Q8H    sodium chloride (NS) flush 5-40 mL  5-40 mL IntraVENous PRN    acetaminophen (TYLENOL) tablet 650 mg  650 mg Oral Q6H    naloxone (NARCAN) injection 0.4 mg  0.4 mg IntraVENous PRN    calcium-vitamin D (OS-SAADIA +D3) 500 mg-200 unit per tablet 1 Tablet  1 Tablet Oral TID WITH MEALS    senna-docusate (PERICOLACE) 8.6-50 mg per tablet 1 Tablet  1 Tablet Oral BID    polyethylene glycol (MIRALAX) packet 17 g  17 g Oral DAILY    [START ON 6/24/2021] bisacodyL (DULCOLAX) suppository 10 mg  10 mg Rectal DAILY PRN    oxyCODONE IR (ROXICODONE) tablet 5 mg  5 mg Oral Q4H PRN    HYDROmorphone (DILAUDID) syringe 0.5 mg  0.5 mg IntraVENous Q4H PRN    sodium chloride (NS) flush 5-40 mL  5-40 mL IntraVENous Q8H    sodium chloride (NS) flush 5-40 mL  5-40 mL IntraVENous PRN    acetaminophen (TYLENOL) tablet 650 mg  650 mg Oral Q6H PRN    Or    acetaminophen (TYLENOL) suppository 650 mg  650 mg Rectal Q6H PRN    polyethylene glycol (MIRALAX) packet 17 g  17 g Oral DAILY PRN    ondansetron (ZOFRAN ODT) tablet 4 mg  4 mg Oral Q8H PRN    Or    ondansetron (ZOFRAN) injection 4 mg  4 mg IntraVENous Q6H PRN    0.9% sodium chloride infusion  75 mL/hr IntraVENous CONTINUOUS    hydrALAZINE (APRESOLINE) tablet 5 mg  5 mg Oral BID    levothyroxine (SYNTHROID) tablet 50 mcg  50 mcg Oral ACB    pantoprazole (PROTONIX) tablet 40 mg  40 mg Oral ACB&D    traMADoL (ULTRAM) tablet 50 mg  50 mg Oral Q6H PRN    morphine injection 2 mg  2 mg IntraVENous Q4H PRN    0.9% sodium chloride infusion 250 mL  250 mL IntraVENous PRN          LAB AND IMAGING FINDINGS:     Lab Results   Component Value Date/Time    WBC 11.3 (H) 06/23/2021 02:18 AM    HGB 9.3 (L) 06/23/2021 02:18 AM    PLATELET 766 75/27/8216 02:18 AM     Lab Results   Component Value Date/Time    Sodium 138 06/23/2021 02:18 AM    Potassium 4.6 06/23/2021 02:18 AM    Chloride 107 06/23/2021 02:18 AM    CO2 24 06/23/2021 02:18 AM    BUN 16 06/23/2021 02:18 AM    Creatinine 0.51 (L) 06/23/2021 02:18 AM    Calcium 7.2 (L) 06/23/2021 02:18 AM      Lab Results   Component Value Date/Time    Alk.  phosphatase 89 06/22/2021 06:07 AM    Protein, total 5.2 (L) 06/22/2021 06:07 AM    Albumin 2.1 (L) 06/22/2021 06:07 AM    Globulin 3.1 06/22/2021 06:07 AM     Lab Results   Component Value Date/Time    INR 1.1 04/26/2021 03:55 PM    Prothrombin time 11.0 04/26/2021 03:55 PM    aPTT 23.1 04/26/2021 03:55 PM      Lab Results   Component Value Date/Time    Iron 24 (L) 04/18/2021 11:26 AM    TIBC 226 (L) 04/18/2021 11:26 AM    Iron % saturation 11 (L) 04/18/2021 11:26 AM      No results found for: PH, PCO2, PO2  No components found for: [place]   Lab Results   Component Value Date/Time    CK 28 04/23/2021 08:30 AM                Total time:   Counseling / coordination time, spent as noted above:   > 50% counseling / coordination?:     Prolonged service was provided for  []30 min   []75 min in face to face time in the presence of the patient, spent as noted above. Time Start:   Time End:   Note: this can only be billed with 13110 (initial) or 56370 (follow up). If multiple start / stop times, list each separately.

## 2021-06-23 NOTE — PROGRESS NOTES
FEMUR DISTAL OPEN REDUCTION INTERNAL FIXATION DAILY NOTE    POD  1 Day Post-Op s/p Procedure(s): FEMUR DISTAL OPEN REDUCTION INTERNAL FIXATION     ASSESSMENT / PLAN : Patient is a 40-year-old severely demented female status post ORIF of the distal femur. 1. Pain Control : Excellent - Able to sleep and participate with therapy  2. Wound or incisional issue : Healing incision with no visible drainage  3. Therapy / Weight Bearing Recommendations : Weight-bear with transfers only. Otherwise nonweightbearing. Range of motion as tolerated. 4. DVT Prophylaxis :  Lovenox and mechanical lower extremity compression device  5. Disposition : Skilled nursing facility  6.   7. Anemia: Hemoglobin is 9.6 as of 630 this morning we will continue to monitor. Received 1 unit yesterday post surgery. 8. Edematous: We will defer to medicine team for fluid control. SUBJECTIVE :     JOHANNY overnight. Pain controlled. Capps in place. Denies CP/SOB/Abd pain/or other complaints. OBJECTIVE :     Vitals:    06/22/21 2210 06/22/21 2310 06/22/21 2340 06/23/21 0310   BP: 103/67 119/73 112/69 121/79   Pulse: 87 69 75 75   Resp: 18 16 16 16   Temp: 97.7 °F (36.5 °C) 97.7 °F (36.5 °C) 97.8 °F (36.6 °C) 97.5 °F (36.4 °C)   SpO2: 98% 97% 97% 95%   Weight:       Height:           Not oriented to place or time. right exam of the knee reveals that the dressing is clean, dry and intact. The patient has + quadriceps, ankle DF/PF, EHL/FHL  Sensation is intact to light touch distally  No calf pain. Labs:  Recent Labs     06/23/21  0218   HGB 9.3*   HCT 30.0*      K 4.6      CO2 24   BUN 16   CREA 0.51*   *        Patient mobility                 Ralph Mcghee [place], [place]  Supervising Physician: Dr. Consuelo Mcgregor.  [address] [place] [address] [phone]  Medical Staff : Maya Mariano  Office : [phone]

## 2021-06-24 LAB
ANION GAP SERPL CALC-SCNC: 3 MMOL/L (ref 5–15)
BASOPHILS # BLD: 0 K/UL (ref 0–0.1)
BASOPHILS NFR BLD: 0 % (ref 0–1)
BUN SERPL-MCNC: 25 MG/DL (ref 6–20)
BUN/CREAT SERPL: 27 (ref 12–20)
CALCIUM SERPL-MCNC: 7.3 MG/DL (ref 8.5–10.1)
CHLORIDE SERPL-SCNC: 108 MMOL/L (ref 97–108)
CO2 SERPL-SCNC: 28 MMOL/L (ref 21–32)
CREAT SERPL-MCNC: 0.94 MG/DL (ref 0.55–1.02)
DIFFERENTIAL METHOD BLD: ABNORMAL
EOSINOPHIL # BLD: 0 K/UL (ref 0–0.4)
EOSINOPHIL NFR BLD: 0 % (ref 0–7)
ERYTHROCYTE [DISTWIDTH] IN BLOOD BY AUTOMATED COUNT: 15.9 % (ref 11.5–14.5)
GLUCOSE SERPL-MCNC: 106 MG/DL (ref 65–100)
HCT VFR BLD AUTO: 22.7 % (ref 35–47)
HGB BLD-MCNC: 7.4 G/DL (ref 11.5–16)
IMM GRANULOCYTES # BLD AUTO: 0 K/UL (ref 0–0.04)
IMM GRANULOCYTES NFR BLD AUTO: 0 % (ref 0–0.5)
LYMPHOCYTES # BLD: 1.8 K/UL (ref 0.8–3.5)
LYMPHOCYTES NFR BLD: 17 % (ref 12–49)
MCH RBC QN AUTO: 29.5 PG (ref 26–34)
MCHC RBC AUTO-ENTMCNC: 32.6 G/DL (ref 30–36.5)
MCV RBC AUTO: 90.4 FL (ref 80–99)
MONOCYTES # BLD: 1.2 K/UL (ref 0–1)
MONOCYTES NFR BLD: 11 % (ref 5–13)
NEUTS SEG # BLD: 7.7 K/UL (ref 1.8–8)
NEUTS SEG NFR BLD: 72 % (ref 32–75)
NRBC # BLD: 0 K/UL (ref 0–0.01)
NRBC BLD-RTO: 0 PER 100 WBC
PLATELET # BLD AUTO: 208 K/UL (ref 150–400)
PMV BLD AUTO: 9.6 FL (ref 8.9–12.9)
POTASSIUM SERPL-SCNC: 4.5 MMOL/L (ref 3.5–5.1)
RBC # BLD AUTO: 2.51 M/UL (ref 3.8–5.2)
SODIUM SERPL-SCNC: 139 MMOL/L (ref 136–145)
WBC # BLD AUTO: 10.8 K/UL (ref 3.6–11)

## 2021-06-24 PROCEDURE — 74011250636 HC RX REV CODE- 250/636: Performed by: STUDENT IN AN ORGANIZED HEALTH CARE EDUCATION/TRAINING PROGRAM

## 2021-06-24 PROCEDURE — 80048 BASIC METABOLIC PNL TOTAL CA: CPT

## 2021-06-24 PROCEDURE — 85025 COMPLETE CBC W/AUTO DIFF WBC: CPT

## 2021-06-24 PROCEDURE — 74011250636 HC RX REV CODE- 250/636: Performed by: HOSPITALIST

## 2021-06-24 PROCEDURE — 74011250637 HC RX REV CODE- 250/637: Performed by: STUDENT IN AN ORGANIZED HEALTH CARE EDUCATION/TRAINING PROGRAM

## 2021-06-24 PROCEDURE — 36415 COLL VENOUS BLD VENIPUNCTURE: CPT

## 2021-06-24 PROCEDURE — 92526 ORAL FUNCTION THERAPY: CPT

## 2021-06-24 PROCEDURE — 77030027138 HC INCENT SPIROMETER -A

## 2021-06-24 PROCEDURE — 94760 N-INVAS EAR/PLS OXIMETRY 1: CPT

## 2021-06-24 PROCEDURE — 74011250637 HC RX REV CODE- 250/637: Performed by: INTERNAL MEDICINE

## 2021-06-24 PROCEDURE — 99233 SBSQ HOSP IP/OBS HIGH 50: CPT | Performed by: INTERNAL MEDICINE

## 2021-06-24 PROCEDURE — 65270000029 HC RM PRIVATE

## 2021-06-24 RX ORDER — OXYCODONE HYDROCHLORIDE 5 MG/1
5 TABLET ORAL
Status: DISCONTINUED | OUTPATIENT
Start: 2021-06-24 | End: 2021-06-25 | Stop reason: HOSPADM

## 2021-06-24 RX ADMIN — Medication 1 TABLET: at 12:30

## 2021-06-24 RX ADMIN — Medication 10 ML: at 15:37

## 2021-06-24 RX ADMIN — DOCUSATE SODIUM 50MG AND SENNOSIDES 8.6MG 1 TABLET: 8.6; 5 TABLET, FILM COATED ORAL at 08:51

## 2021-06-24 RX ADMIN — Medication 10 ML: at 06:45

## 2021-06-24 RX ADMIN — LEVOTHYROXINE SODIUM 50 MCG: 0.05 TABLET ORAL at 06:41

## 2021-06-24 RX ADMIN — DOCUSATE SODIUM 50MG AND SENNOSIDES 8.6MG 1 TABLET: 8.6; 5 TABLET, FILM COATED ORAL at 17:32

## 2021-06-24 RX ADMIN — TRAMADOL HYDROCHLORIDE 50 MG: 50 TABLET, FILM COATED ORAL at 03:16

## 2021-06-24 RX ADMIN — Medication 10 ML: at 21:41

## 2021-06-24 RX ADMIN — POLYETHYLENE GLYCOL 3350 17 G: 17 POWDER, FOR SOLUTION ORAL at 08:50

## 2021-06-24 RX ADMIN — ACETAMINOPHEN 1000 MG: 500 TABLET, FILM COATED ORAL at 06:40

## 2021-06-24 RX ADMIN — PANTOPRAZOLE SODIUM 40 MG: 40 TABLET, DELAYED RELEASE ORAL at 15:38

## 2021-06-24 RX ADMIN — PANTOPRAZOLE SODIUM 40 MG: 40 TABLET, DELAYED RELEASE ORAL at 06:40

## 2021-06-24 RX ADMIN — Medication 10 ML: at 06:41

## 2021-06-24 RX ADMIN — Medication 1 TABLET: at 08:51

## 2021-06-24 RX ADMIN — OXYCODONE 5 MG: 5 TABLET ORAL at 15:38

## 2021-06-24 RX ADMIN — SODIUM CHLORIDE 500 ML: 900 INJECTION, SOLUTION INTRAVENOUS at 00:15

## 2021-06-24 RX ADMIN — ENOXAPARIN SODIUM 30 MG: 30 INJECTION SUBCUTANEOUS at 08:50

## 2021-06-24 RX ADMIN — ACETAMINOPHEN 1000 MG: 500 TABLET, FILM COATED ORAL at 21:41

## 2021-06-24 RX ADMIN — OXYCODONE 5 MG: 5 TABLET ORAL at 21:41

## 2021-06-24 RX ADMIN — Medication 1 TABLET: at 17:32

## 2021-06-24 NOTE — PROGRESS NOTES
Spiritual Care Assessment/Progress Note  [address]      NAME: Hu Foley      MRN: [id number]  AGE: 80 y.o.  SEX: female  Anabaptist Affiliation: Synagogue   Language: English     6/24/2021     Total Time (in minutes): 17     Spiritual Assessment begun in [facility] POST SURG ORT 1 through conversation with:         [x]Patient        [] Family    [] Friend(s)        Reason for Consult: Initial/Spiritual assessment, patient floor     Spiritual beliefs: (Please include comment if needed)     [x] Identifies with a morenita tradition:    Synagogue     [] Supported by a morenita community:            [] Claims no spiritual orientation:           [] Seeking spiritual identity:                [] Adheres to an individual form of spirituality:           [] Not able to assess:                           Identified resources for coping:      [x] Prayer                               [x] Music                  [x] Guided Imagery     [] Family/friends                 [] Pet visits     [] Devotional reading                         [] Unknown     [] Other:                                               Interventions offered during this visit: (See comments for more details)    Patient Interventions: Affirmation of emotions/emotional suffering, Affirmation of morenita, Catharsis/review of pertinent events in supportive environment, Coping skills reviewed/reinforced, Initial/Spiritual assessment, patient floor, Prayer (assurance of)           Plan of Care:     [] Support spiritual and/or cultural needs    [] Support AMD and/or advance care planning process      [] Support grieving process   [] Coordinate Rites and/or Rituals    [] Coordination with community clergy   [] No spiritual needs identified at this time   [] Detailed Plan of Care below (See Comments)  [] Make referral to Music Therapy  [] Make referral to Pet Therapy     [] Make referral to Addiction services  [] Make referral to [facility]  [] Make referral to Spiritual Care Partner  [] No future visits requested        [x] Follow up upon further referrals     Comments:    made visit to patients room, where she was lying in bed. Patient welcomed the visit and was hard to understand at first but she become more clearer and seemingly happier the more she talked.  provided a claming, listening presence and patient seemed to enjoy the company and started smiling a lot. Patient enjoys company and someone to talk to. Patient expressed that  gave her a warm and peaceful feeling. Advised patient and nurse to contact [facility] for any further referrals.     Chaplain Dawson Peña M.Div.  Beryl Pena ([id number])

## 2021-06-24 NOTE — PROGRESS NOTES
[address] Adult  Hospitalist Group                                                                                          Hospitalist Progress Note  Keena Vera MD        Date of Service:  2021  NAME:  Shannan Wong  :  1930  MRN:  [id number]      Admission Summary:       Interval history / Subjective:    pleasantly confused currently, but nursing reports patient has been confused, agitated and verbally aggressive. Assessment & Plan:     Right femur fracture  -fracture reduced in ED, ortho consulted, s/p ORIF   -tramadol, IV morphine prn for pain  -CM for discharge planning  -plan SNF vs hospice, palliative having GOC discussions with family     Acute blood loss anemia  -likely from surgery  -s/p 1 unit PRBC  -cont to monitor    Hypothyroidism  -cont LT4     GERD  -cont protonix     HTN  -cont home hydralazine    Code status: DNR  DVT prophylaxis: [facility] Problems  Date Reviewed: 2021        Codes Class Noted POA    Femur fracture ([facility] [address] ICD-10-CM: O72.36VW  ICD-9-CM: 821.00  2021 Unknown                Review of Systems:   A comprehensive review of systems was negative except for that written in the HPI. Vital Signs:    Last 24hrs VS reviewed since prior progress note. Most recent are:  Visit Vitals  BP (!) 98/59 (BP 1 Location: Left arm, BP Patient Position: At rest)   Pulse 75   Temp 97.4 °F (36.3 °C)   Resp 18   Ht 4' 8\" (1.422 m)   Wt 44 kg (96 lb 14.4 oz)   SpO2 92%   BMI 21.72 kg/m²         Intake/Output Summary (Last 24 hours) at 2021 1003  Last data filed at 2021 0843  Gross per 24 hour   Intake 860 ml   Output    Net 860 ml        Physical Examination:             Constitutional:  No acute distress   ENT:  Oral mucosa moist, oropharynx benign. Resp:  CTA bilaterally. No wheezing/rhonchi/rales. No accessory muscle use   CV:  Regular rhythm, normal rate, no murmurs, gallops, rubs    GI:  Soft, non distended, non tender. normoactive bowel sounds, no hepatosplenomegaly     Musculoskeletal:  No edema, warm, 2+ pulses throughout    Neurologic:  Moves all extremities. AAOx3, CN II-XII reviewed     Psych:  No insight       Data Review:    Review and/or order of clinical lab test      Labs:     Recent Labs     06/24/21 0222 06/23/21 0218   WBC 10.8 11.3*   HGB 7.4* 9.3*   HCT 22.7* 30.0*    167     Recent Labs     06/24/21 0222 06/23/21 0218 06/22/21  1832    138 140   K 4.5 4.6 4.9    107 109*   CO2 28 24 27   BUN 25* 16 16   CREA 0.94 0.51* 0.40*   * 103* 112*   CA 7.3* 7.2* 6.9*     Recent Labs     06/22/21  0607   ALT 19   AP 89   TBILI 0.3   TP 5.2*   ALB 2.1*   GLOB 3.1     No results for input(s): INR, PTP, APTT, INREXT, INREXT in the last 72 hours. No results for input(s): FE, TIBC, PSAT, FERR in the last 72 hours. Lab Results   Component Value Date/Time    Folate 6.8 04/18/2021 11:26 AM      No results for input(s): PH, PCO2, PO2 in the last 72 hours. No results for input(s): CPK, CKNDX, TROIQ in the last 72 hours.     No lab exists for component: CPKMB  No results found for: CHOL, CHOLX, CHLST, CHOLV, HDL, HDLP, LDL, LDLC, DLDLP, TGLX, TRIGL, TRIGP, CHHD, CHHDX  Lab Results   Component Value Date/Time    Glucose (POC) 125 (H) 06/22/2021 06:17 PM    Glucose (POC) 113 (H) 04/21/2021 04:06 PM    Glucose (POC) 127 (H) 04/21/2021 12:30 PM    Glucose (POC) 104 (H) 04/20/2021 09:28 PM    Glucose (POC) 107 (H) 04/20/2021 05:12 PM     Lab Results   Component Value Date/Time    Color YELLOW/STRAW 04/23/2021 04:09 AM    Appearance CLEAR 04/23/2021 04:09 AM    Specific gravity 1.017 04/23/2021 04:09 AM    Specific gravity 1.015 04/18/2021 10:00 AM    pH (UA) 5.0 04/23/2021 04:09 AM    Protein Negative 04/23/2021 04:09 AM    Glucose Negative 04/23/2021 04:09 AM    Ketone Negative 04/23/2021 04:09 AM    Bilirubin Negative 04/23/2021 04:09 AM    Urobilinogen 0.2 04/23/2021 04:09 AM    Nitrites Negative 04/23/2021 04:09 AM    Leukocyte Esterase SMALL (A) 04/23/2021 04:09 AM    Epithelial cells FEW 04/23/2021 04:09 AM    Bacteria Negative 04/23/2021 04:09 AM    WBC 0-4 04/23/2021 04:09 AM    RBC 0-5 04/23/2021 04:09 AM         Medications Reviewed:     Current Facility-Administered Medications   Medication Dose Route Frequency    enoxaparin (LOVENOX) injection 30 mg  30 mg SubCUTAneous DAILY    sodium chloride (NS) flush 5-40 mL  5-40 mL IntraVENous Q8H    sodium chloride (NS) flush 5-40 mL  5-40 mL IntraVENous PRN    naloxone (NARCAN) injection 0.4 mg  0.4 mg IntraVENous PRN    calcium-vitamin D (OS-SAADIA +D3) 500 mg-200 unit per tablet 1 Tablet  1 Tablet Oral TID WITH MEALS    senna-docusate (PERICOLACE) 8.6-50 mg per tablet 1 Tablet  1 Tablet Oral BID    polyethylene glycol (MIRALAX) packet 17 g  17 g Oral DAILY    bisacodyL (DULCOLAX) suppository 10 mg  10 mg Rectal DAILY PRN    oxyCODONE IR (ROXICODONE) tablet 5 mg  5 mg Oral Q4H PRN    acetaminophen (TYLENOL) tablet 1,000 mg  1,000 mg Oral Q8H    0.9% sodium chloride infusion  75 mL/hr IntraVENous PRN    sodium chloride (NS) flush 5-40 mL  5-40 mL IntraVENous Q8H    sodium chloride (NS) flush 5-40 mL  5-40 mL IntraVENous PRN    acetaminophen (TYLENOL) tablet 650 mg  650 mg Oral Q6H PRN    Or    acetaminophen (TYLENOL) suppository 650 mg  650 mg Rectal Q6H PRN    polyethylene glycol (MIRALAX) packet 17 g  17 g Oral DAILY PRN    ondansetron (ZOFRAN ODT) tablet 4 mg  4 mg Oral Q8H PRN    Or    ondansetron (ZOFRAN) injection 4 mg  4 mg IntraVENous Q6H PRN    0.9% sodium chloride infusion  75 mL/hr IntraVENous CONTINUOUS    hydrALAZINE (APRESOLINE) tablet 5 mg  5 mg Oral BID    levothyroxine (SYNTHROID) tablet 50 mcg  50 mcg Oral ACB    pantoprazole (PROTONIX) tablet 40 mg  40 mg Oral ACB&D    traMADoL (ULTRAM) tablet 50 mg  50 mg Oral Q6H PRN    morphine injection 2 mg  2 mg IntraVENous Q4H PRN ______________________________________________________________________  EXPECTED LENGTH OF STAY: 2d 19h  ACTUAL LENGTH OF STAY:          2                 Cely To MD

## 2021-06-24 NOTE — PROGRESS NOTES
Orthopedic NP Progress Note  Post Op day: 2 Days Post-Op    June 24, 2021 1717 [facility]    Attending Physician: Treatment Team: Attending Provider: Milton Almonte MD; Consulting Provider: Sussy Baker MD; Consulting Provider: DIEGO Bennett; Primary Nurse: Colonel Guevara; Consulting Provider: Jassi Gaytan MD; Care Manager: Scott Brennan Primary Nurse: Georgie Quiroz RN; Utilization Review: Mia Davis RN     Vital Signs:    Patient Vitals for the past 8 hrs:   BP Temp Pulse Resp SpO2   06/24/21 0751 (!) 98/59 97.4 °F (36.3 °C) 75 18 92 %   06/24/21 0246 115/64 97.7 °F (36.5 °C) 70 22 93 %          Intake/Output:  06/24 0701 - 06/24 1900  In: 120 [P.O.:120]  Out: -   06/22 1901 - 06/24 0700  In: 2483.9 [P.O.:240; I.V.:1896.3]  Out: 550 [Urine:550]    Pain Control:   Pain Assessment  Pain Scale 1: PAINAD (Advanced Dementia)  Pain Intensity 1: 0  Pain Location 1: Leg  Pain Intervention(s) 1: Medication (see MAR) (immobilizing leg )    LAB:    Recent Labs     06/24/21 0222   HCT 22.7*   HGB 7.4*     Lab Results   Component Value Date/Time    Sodium 139 06/24/2021 02:22 AM    Potassium 4.5 06/24/2021 02:22 AM    Chloride 108 06/24/2021 02:22 AM    CO2 28 06/24/2021 02:22 AM    Glucose 106 (H) 06/24/2021 02:22 AM    BUN 25 (H) 06/24/2021 02:22 AM    Creatinine 0.94 06/24/2021 02:22 AM    Calcium 7.3 (L) 06/24/2021 02:22 AM       Subjective:  Manual Purpura is a 80 y.o. female s/p a  Procedure(s): FEMUR DISTAL OPEN REDUCTION INTERNAL FIXATION   Procedure(s): FEMUR DISTAL OPEN REDUCTION INTERNAL FIXATION. Tolerating diet. Resting in bed, resting with eyes closed        Objective: General: alert, cooperative, no distress. Neuro/Vascular: CNS Intact. Sensation stable. Brisk cap refill, + pulses UE/LE  Musculoskeletal:  +ROM UE/LE, + R DF/PF, NVI . Skin: Incision - clean, dry and intact. No significant erythema or swelling.     Dressing: clean, dry, and intact    Capps - n  Drain - n       PT/OT:   Gait:                      Assessment:    s/p Procedure(s):   FEMUR DISTAL OPEN REDUCTION INTERNAL FIXATION    Active Problems:    Femur fracture ([address] (6/22/2021)         Plan:   -  Continue PT/OT - WBAT with transfers only, otherwise NWB   -  Continue established methods of pain control  -  VTE Prophylaxes - TEDS &/or SCDs with lovneox for 30 days total   -  Acute Blood Loss Anemia - s/p 1 unit post op, hgb 7.4 today, will continue to monitor       Discharge To:  Assisted Living facility as prior to admission       Signed By: Dani Waldron NP    Orthopedic Nurse Practitioner

## 2021-06-24 NOTE — PROGRESS NOTES
Physician Progress Note      Zeferino Cruz  CSN #:                  [id number]  :                       1930  ADMIT DATE:       2021 5:45 AM  DISCH DATE:  RESPONDING  PROVIDER #:        Yulia Fuentes MD          QUERY TEXT:    Good afternoon    This patient admitted for right femur fracture    No documentation or history of Dementia. GCS on admission 15---post op GCS documented 10-12. Post op nursing notes, and progress notes, indicate pt is confused, agitated and verbally aggressive. If possible, please document in the progress notes and discharge summary if you are evaluating and / or treating any of the following: The medical record reflects the following:  Risk Factors:  80 yr old elderly pt s/p femur repair, post anesthesia, pain meds, acute blood loss  Clinical Indicators: On admission documentation of alert and oriented x3, post op noted documentation of confusion, agitation and verbally aggressive, pulled out IV, GSC on admission @ 15 and post op 10-12. Treatment: Neuro assessment frequent,  nursing neuro assessment with GCS, Redirecting, Tx of the Anemia with x1 unit of PRBCs, Pain management    Thank you,  Betsy Emerson [place], [address] [facility], [address] [place] [address], [facility]  [phone]  Options provided:  -- Drug-induced encephalopathy due to *** (name of drug/drugs)  -- Drug-induced encephalopathy due to, Please specify substance. -- Drug-induced encephalopathy, substance(s) unknown  -- Metabolic encephalopathy  -- Toxic encephalopathy  -- Toxic metabolic encephalopathy  -- Delirium due to, Please specify cause. -- Delirium  -- Other - I will add my own diagnosis  -- Disagree - Not applicable / Not valid  -- Disagree - Clinically unable to determine / Unknown  -- Refer to Clinical Documentation Reviewer    PROVIDER RESPONSE TEXT:    This patient has metabolic encephalopathy.     Query created by: Adarsh Ellington on 2021 1:01 PM      Electronically signed by:  Yulia Fuentes MD 6/24/2021 10:40 AM

## 2021-06-24 NOTE — PROGRESS NOTES
Problem: Dysphagia (Adult)  Goal: *Acute Goals and Plan of Care (Insert Text)  Description: Swallowing goals initiated 6-23-21:  1) tolerate mech soft, thins without s/s aspiration by 6-25-21  Outcome: Progressing Towards Goal   SPEECH LANGUAGE PATHOLOGY DYSPHAGIA TREATMENT  Patient: Dayana Servin (8 y.o. female)  Date: 6/24/2021  Diagnosis: Femur fracture ([address] [S72.90XA] <principal problem not specified>  Procedure(s) (LRB):  FEMUR DISTAL OPEN REDUCTION INTERNAL FIXATION (Right) 2 Days Post-Op  Precautions: aspiration      ASSESSMENT:  Patient tolerating soft and BS diet and NECTAR thick liquids. Poor insight into deficits. PLAN:  Recommendations and Planned Interventions:  Continue S and BS diet and Nectar thick liquids. Patient continues to benefit from skilled intervention to address the above impairments. Continue treatment per established plan of care. Discharge Recommendations: To Be Determined     SUBJECTIVE:   Patient stated oww owww owww! .-when SLP sat her up for PO trials. OBJECTIVE:   Cognitive and Communication Status:  Neurologic State: Alert  Orientation Level: Oriented to person, Disoriented to situation, Disoriented to place, Disoriented to time  Cognition: Impaired decision making, Impulsive, Memory loss, Decreased command following  Perception:  ([place]  Perseveration: No perseveration noted  Safety/Judgement: Lack of insight into deficits  Dysphagia Treatment:  Oral Assessment:     P.O.  Trials:  Patient Position: upright in bed  Vocal quality prior to P.O.: No impairment  Consistency Presented: Nectar thick liquid (coffee)  How Presented: Self-fed/presented;Cup/sip   ORAL PHASE:   Bolus Acceptance: No impairment  Bolus Formation/Control: No impairment     Propulsion: No impairment  Oral Residue: None   PHARYNGEAL PHASE:   Initiation of Swallow: No impairment  Laryngeal Elevation: Functional  Aspiration Signs/Symptoms: Clear throat (occasional)      CNA reports that she ate a moderate amount of breakfast without issues. Exercises:  Laryngeal Exercises:                                                                                                                                   Pain:             After treatment:   Patient left in no apparent distress in bed and Nursing notified    COMMUNICATION/EDUCATION:   Patient was educated regarding her deficit(s) of dysphagia  as this relates to her diagnosis of post ortho surgery. She demonstrated Guarded understanding as evidenced by dementia. The patient's plan of care including recommendations, planned interventions, and recommended diet changes were discussed with: Registered nurse.      Steffi Rojo SLP  Time Calculation: 10 mins

## 2021-06-24 NOTE — PROGRESS NOTES
Physical Therapy    Attempted PT treatment, however pt crying out in pain with all touch including repositioning of UEs on pillows to decrease edema bilat hands. Session aborted due to pt unable to tolerate. Note palliative involvement and ongoing discussion with family regarding GOC. Will con't to follow as appropriate.     Melanie Rodriguez, PT, MPT

## 2021-06-24 NOTE — CONSULTS
Palliative Medicine Consult  [place]: [phone]    Patient Name: Ricardo Blair  YOB: 1930    Date of Initial Consult: 6/23/2021  Reason for Consult: care decisions  Requesting Provider: Dr. Warden Diaz  Primary Care Physician: Lencho Quezada DO     SUMMARY:   Ricardo Blair is a 80 y.o. with a past history of dementia, 2nd degree AV block, hypothyroidism, hearing loss, R TKR, GI bleed April 2021, who was admitted on 6/22/2021 from home with a diagnosis of fall with R femur fracture. Current medical issues leading to Palliative Medicine involvement include: Patient with new distal R femur fracture s/p ORIF 6/22/21, dementia, hearing impaired, debility. We are asked to discuss care goals with patient's son on 6/24/21. Patient lives at [facility]. Darryl Suarez H: [phone]    DNR order on chart  No ACP documents on chart     PALLIATIVE DIAGNOSES:   1. R femur fx, s/p ORIF on 6/22/21  2. Post op pain  3. Anemia, acute blood loss, (transfused 1 unit 6/22)  Hb 7.4 today  4. Dementia, unsure of baseline function, more info needed  5. Skin breakdown POA (R and L dorsal foot, sacrum)  6. Debility  7. Hearing impaired  8. Dysphagia ? Post intubation inflammation- improving  9. hypoalbuminemia       PLAN:   1. Discussed with case management. Patient has option to return to [facility] with PT/OT or with hospice. 2. Pain - no nonverbal signs of pain at this time. 1. Continue scheduled tylenol 1000mg PO Q8hrs  2. Morphine 2mg IV Q4hr PRN pain  (one dose given in last 24hrs)   3. Oxycodone 5mg PO Q4hr PRN pain  4. If patient has further episodes of agitation, consider pain as cause. 3. PT/OT consults reviewed. I do not think she is going to be able to participate much or benefit from ongoing PT/OT as outpatient. I would recommend she return home to [facility] with hospice supports. 4. Call placed to Kirill ALEXIS.   Patient's son Carole Hirsch is still out at work () and will be by hospital today or can call me later. - number provided. I explained to [facility] my recommendation for resumption of hospice services. Lois concerned about patient not getting out of bed if in hospice -- explain it's about letting patient lead the way, if she wants to get up and can with assistance then staff would do that -- but hospice would avoid burdensome interventions, like exercises if patient isn't wanting to participate. This fracture will likely be part of big picture of decline for patient, a large setback in light of her overall frailty. GOALS OF CARE / TREATMENT PREFERENCES:     GOALS OF CARE:  Patient/Health Care Proxy Stated Goals: Rehabilitation    TREATMENT PREFERENCES:   Code Status: DNR    Advance Care Planning:  [] The [facility] Interdisciplinary Team has updated the ACP Navigator with Health Care Decision Maker and Patient Capacity      Primary Decision Maker: Murriel Castleman - Child - [phone]    Primary Decision Maker: Kathryn Fernandez - Daughter-in-Law - [phone]  Advance Care Planning 6/23/2021   Confirm Advance Directive Yes, on file       Medical Interventions: Limited additional interventions     Other Instructions: Other:    As far as possible, the palliative care team has discussed with patient / health care proxy about goals of care / treatment preferences for patient. HISTORY:     History obtained from:   chart  CHIEF COMPLAINT: fall out of bed with leg fracture    HPI/SUBJECTIVE:    The patient is:   [x] Verbal and participatory  [] Non-participatory due to:     80year old female admitted with fall out of bed resulting in leg fracture. She is a poor historian and unable to supply history.         Clinical Pain Assessment (nonverbal scale for severity on nonverbal patients):   Clinical Pain Assessment  Severity: 0     Activity (Movement): Restless, excessive activity and/or withdrawal reflexes    Duration: for how long has pt been experiencing pain (e.g., 2 days, 1 month, years)  Frequency: how often pain is an issue (e.g., several times per day, once every few days, constant)     FUNCTIONAL ASSESSMENT:     Palliative Performance Scale (PPS):  PPS: 30       PSYCHOSOCIAL/SPIRITUAL SCREENING:     Palliative IDT has assessed this patient for cultural preferences / practices and a referral made as appropriate to needs (Cultural Services, Patient Advocacy, Ethics, etc.)    Any spiritual / Yazidism concerns:  [] Yes /  [x] No    Caregiver Burnout:  [] Yes /  [x] No /  [] No Caregiver Present      Anticipatory grief assessment:   [x] Normal  / [] Maladaptive       ESAS Anxiety: Anxiety: 0    ESAS Depression: Depression: 0        REVIEW OF SYSTEMS:     Positive and pertinent negative findings in ROS are noted above in HPI. The following systems were [x] reviewed / [] unable to be reviewed as noted in HPI  Other findings are noted below. Systems: constitutional, ears/nose/mouth/throat, respiratory, gastrointestinal, genitourinary, musculoskeletal, integumentary, neurologic, psychiatric, endocrine. Positive findings noted below. Modified ESAS Completed by: provider   Fatigue: 0 Drowsiness: 0   Depression: 0 Pain: 0   Anxiety: 0 Nausea: 0   Anorexia: 0 Dyspnea: 0     Constipation: No     Stool Occurrence(s): 0        PHYSICAL EXAM:     From RN flowsheet:  Wt Readings from Last 3 Encounters:   06/22/21 44 kg (96 lb 14.4 oz)   04/20/21 36.3 kg (80 lb)     Blood pressure (!) 98/59, pulse 75, temperature 97.4 °F (36.3 °C), resp. rate 18, height 4' 8\" (1.422 m), weight 44 kg (96 lb 14.4 oz), SpO2 92 %.     Pain Scale 1: PAINAD (Advanced Dementia)  Pain Intensity 1: 0     Pain Location 1: Leg        Pain Intervention(s) 1: Medication (see MAR) (immobilizing leg )  Last bowel movement, if known:     Constitutional: sleeping, NAD  Elderly frail appearing    Breakfast tray ;  100% oatmeal eaten, few bits of eggs     HISTORY:     Active Problems:    Femur fracture ([facility] (6/22/2021)      Past Medical History:   Diagnosis Date    Arthritis     Hypertension     Hypothyroidism       Past Surgical History:   Procedure Laterality Date    HX CHOLECYSTECTOMY      HX GYN      partial hysterectomy, uterine prolapse    HX HEENT      cataract    HX ORTHOPAEDIC Right     knee replacement      Family History   Problem Relation Age of Onset    Heart Disease Neg Hx       History reviewed, no pertinent family history.   Social History     Tobacco Use    Smoking status: Never Smoker    Smokeless tobacco: Never Used   Substance Use Topics    Alcohol use: Not Currently     Allergies   Allergen Reactions    Pcn [Penicillins] Unknown (comments)    Statins-Hmg-Coa Reductase Inhibitors Unknown (comments)    Warfarin Unknown (comments)      Current Facility-Administered Medications   Medication Dose Route Frequency    enoxaparin (LOVENOX) injection 30 mg  30 mg SubCUTAneous DAILY    sodium chloride (NS) flush 5-40 mL  5-40 mL IntraVENous Q8H    sodium chloride (NS) flush 5-40 mL  5-40 mL IntraVENous PRN    naloxone (NARCAN) injection 0.4 mg  0.4 mg IntraVENous PRN    calcium-vitamin D (OS-SAADIA +D3) 500 mg-200 unit per tablet 1 Tablet  1 Tablet Oral TID WITH MEALS    senna-docusate (PERICOLACE) 8.6-50 mg per tablet 1 Tablet  1 Tablet Oral BID    polyethylene glycol (MIRALAX) packet 17 g  17 g Oral DAILY    bisacodyL (DULCOLAX) suppository 10 mg  10 mg Rectal DAILY PRN    oxyCODONE IR (ROXICODONE) tablet 5 mg  5 mg Oral Q4H PRN    acetaminophen (TYLENOL) tablet 1,000 mg  1,000 mg Oral Q8H    0.9% sodium chloride infusion  75 mL/hr IntraVENous PRN    sodium chloride (NS) flush 5-40 mL  5-40 mL IntraVENous Q8H    sodium chloride (NS) flush 5-40 mL  5-40 mL IntraVENous PRN    acetaminophen (TYLENOL) tablet 650 mg  650 mg Oral Q6H PRN    Or    acetaminophen (TYLENOL) suppository 650 mg  650 mg Rectal Q6H PRN    polyethylene glycol (MIRALAX) packet 17 g  17 g Oral DAILY PRN    ondansetron (ZOFRAN ODT) tablet 4 mg  4 mg Oral Q8H PRN    Or    ondansetron (ZOFRAN) injection 4 mg  4 mg IntraVENous Q6H PRN    0.9% sodium chloride infusion  75 mL/hr IntraVENous CONTINUOUS    hydrALAZINE (APRESOLINE) tablet 5 mg  5 mg Oral BID    levothyroxine (SYNTHROID) tablet 50 mcg  50 mcg Oral ACB    pantoprazole (PROTONIX) tablet 40 mg  40 mg Oral ACB&D    traMADoL (ULTRAM) tablet 50 mg  50 mg Oral Q6H PRN    morphine injection 2 mg  2 mg IntraVENous Q4H PRN          LAB AND IMAGING FINDINGS:     Lab Results   Component Value Date/Time    WBC 10.8 06/24/2021 02:22 AM    HGB 7.4 (L) 06/24/2021 02:22 AM    PLATELET 139 87/12/3049 02:22 AM     Lab Results   Component Value Date/Time    Sodium 139 06/24/2021 02:22 AM    Potassium 4.5 06/24/2021 02:22 AM    Chloride 108 06/24/2021 02:22 AM    CO2 28 06/24/2021 02:22 AM    BUN 25 (H) 06/24/2021 02:22 AM    Creatinine 0.94 06/24/2021 02:22 AM    Calcium 7.3 (L) 06/24/2021 02:22 AM      Lab Results   Component Value Date/Time    Alk. phosphatase 89 06/22/2021 06:07 AM    Protein, total 5.2 (L) 06/22/2021 06:07 AM    Albumin 2.1 (L) 06/22/2021 06:07 AM    Globulin 3.1 06/22/2021 06:07 AM     Lab Results   Component Value Date/Time    INR 1.1 04/26/2021 03:55 PM    Prothrombin time 11.0 04/26/2021 03:55 PM    aPTT 23.1 04/26/2021 03:55 PM      Lab Results   Component Value Date/Time    Iron 24 (L) 04/18/2021 11:26 AM    TIBC 226 (L) 04/18/2021 11:26 AM    Iron % saturation 11 (L) 04/18/2021 11:26 AM      No results found for: PH, PCO2, PO2  No components found for: [place]   Lab Results   Component Value Date/Time    CK 28 04/23/2021 08:30 AM                Total time: 35  Counseling / coordination time, spent as noted above: 25  > 50% counseling / coordination?: yes    Prolonged service was provided for  []30 min   []75 min in face to face time in the presence of the patient, spent as noted above.   Time Start:   Time End:   Note: this can only be billed with 22644 (initial) or 33612 (follow up). If multiple start / stop times, list each separately.

## 2021-06-24 NOTE — PROGRESS NOTES
6/24/2021  3:20 PM  RUR:  20%  Risk Level: []Low [x]Moderate []High  Value-based purchasing: [] Yes [x] No  Bundle patient: [] Yes [x] No   Specify:     Transition of care plan:  1. Awaiting medical clearance and DC order. Palliative following. PT/OT treating. Ortho following. 2. Hospice at [facility] - Palliative recommending pt resume hospice care. Pt's DIL notified. Pt's son to call palliative to discuss further when he arrives from work. 3. Outpatient follow-up. 4. [facility] ambulance transport required.

## 2021-06-24 NOTE — PROGRESS NOTES
MD notified via perfect serv pt's bp was 85/53 on r arm and 95/56 on l arm. Fluids going at 75ml/hr. Orders to give a 500mL fluid bolus. BP up to 106/61. Will continue to monitor.

## 2021-06-24 NOTE — CONSULTS
Comprehensive Nutrition Assessment    Type and Reason for Visit: Initial    Nutrition Recommendations/Plan:   Diet per SLP recs  Magic Cup 1x daily & Fortified Pudding 1x daily    Nutrition Assessment:     80year old female admitted for Femur fracture ([facility] Keron Mccord who has a past medical history of Arthritis, Hypertension, and Hypothyroidism. Pt appears thinner than weight or edema is covering actual dry weight. RD visited with patient. She is pleasantly confused. Pt asks for coffee, RD assisted with thickened coffee. Pt likes to be fed but is encouraged to try using her own hands to drink from the cup. She didn't like the taste of the mildly thick tea offered. Malnutrition Assessment:  Malnutrition Status:  Severe malnutrition    Context:  Acute illness  / Chronic   Findings of the 6 clinical characteristics of malnutrition: Thin skin, multiple areas noted by wound care, early satiety, difficulty with self-feeding   Energy Intake:  7 - 50% or less of est energy requirements for 5 or more days  Weight Loss:    wt appears up with edema or inaccurate scale wt     Body Fat Loss:  1 - Mild body fat loss, Fat overlying ribs, Triceps   Muscle Mass Loss:  7 - Moderate muscle mass loss, Temples (temporalis), Clavicles (pectoralis & deltoids), Scapula (trapezius)  Fluid Accumulation:  No significant fluid accumulation,     Strength:  Not performed     Estimated Daily Nutrient Needs:  Energy (kcal): 1100 - 1250 kcal (25-28kcal/kg); Weight Used for Energy Requirements: Admission  Protein (g): 44-53g (1-1.2g/kg); Weight Used for Protein Requirements: Admission  Fluid (ml/day): 1200mL;    Method Used for Fluid Requirements: 1 ml/kcal    Nutrition Related Findings:    Last BM: 6/22    ABD: soft, active  Edema: 2+ pitting LUE, RUE     Wounds:    Multiple - R top of foot skin tear; gluteal fold/cleft erythema; L heel dry scab; R heel blanchable redness; L arm skin tear; forehead scab      Current Nutrition Therapies:  ADULT DIET Dysphagia - Soft & Bite Sized; Mildly Thick ([place])  DIET ONE TIME MESSAGE    Documented Meal intake:  Patient Vitals for the past 168 hrs:   % Diet Eaten   06/24/21 0843 1 - 25%   06/23/21 1648 26 - 50%   06/23/21 1145 0%   06/22/21 0953 0%     Documentation of supplement intake:  No data found. Anthropometric Measures:  · Height:  4' 8\" (142.2 cm)  · Current Body Wt:  44 kg (97 lb)   · Admission Body Wt:  97 lb    · Usual Body Wt:        · Ideal Body Wt:  80 lbs:  121.3 %   · Adjusted Body Weight:   ; Weight Adjustment for: No adjustment   · Adjusted BMI:       · BMI Category:  Underweight (BMI less than 22) age over 72     Body mass index is 21.72 kg/m².     Wt Readings from Last 3 Encounters:   06/22/21 44 kg (96 lb 14.4 oz)   04/20/21 36.3 kg (80 lb)     Last 3 Recorded Weights in this Encounter    06/22/21 0552   Weight: 44 kg (96 lb 14.4 oz)       Nutrition Diagnosis:   · Inadequate protein-energy intake related to cognitive or neurological impairment, biting/chewing (masticatory) difficulty, early satiety as evidenced by intake 0-25%, wounds, BMI, poor intake prior to admission      Nutrition Interventions:   Food and/or Nutrient Delivery: Continue current diet, Start oral nutrition supplement  Nutrition Education and Counseling: Education not appropriate  Coordination of Nutrition Care: Continue to monitor while inpatient, Feeding assistance/environmental change    Goals:  Pt will tolerate 50% of meals and ONS over the next 4-5 days       Nutrition Monitoring and Evaluation:   Behavioral-Environmental Outcomes: None identified  Food/Nutrient Intake Outcomes: Food and nutrient intake, Supplement intake  Physical Signs/Symptoms Outcomes: Weight, Meal time behavior    Discharge Planning:    Continue oral nutrition supplement, Continue current diet     Electronically signed by Rina Gabriel RD on 6/24/2021 at 10:17 AM  Contact via [employer] or office [phone]

## 2021-06-25 VITALS
HEART RATE: 77 BPM | WEIGHT: 96.9 LBS | HEIGHT: 56 IN | TEMPERATURE: 98 F | RESPIRATION RATE: 18 BRPM | OXYGEN SATURATION: 94 % | SYSTOLIC BLOOD PRESSURE: 115 MMHG | DIASTOLIC BLOOD PRESSURE: 82 MMHG | BODY MASS INDEX: 21.8 KG/M2

## 2021-06-25 LAB
BASOPHILS # BLD: 0 K/UL (ref 0–0.1)
BASOPHILS NFR BLD: 0 % (ref 0–1)
DIFFERENTIAL METHOD BLD: ABNORMAL
EOSINOPHIL # BLD: 0.1 K/UL (ref 0–0.4)
EOSINOPHIL NFR BLD: 1 % (ref 0–7)
ERYTHROCYTE [DISTWIDTH] IN BLOOD BY AUTOMATED COUNT: 16 % (ref 11.5–14.5)
HCT VFR BLD AUTO: 24.3 % (ref 35–47)
HGB BLD-MCNC: 7.3 G/DL (ref 11.5–16)
IMM GRANULOCYTES # BLD AUTO: 0.1 K/UL (ref 0–0.04)
IMM GRANULOCYTES NFR BLD AUTO: 1 % (ref 0–0.5)
LYMPHOCYTES # BLD: 2 K/UL (ref 0.8–3.5)
LYMPHOCYTES NFR BLD: 18 % (ref 12–49)
MCH RBC QN AUTO: 28.7 PG (ref 26–34)
MCHC RBC AUTO-ENTMCNC: 30 G/DL (ref 30–36.5)
MCV RBC AUTO: 95.7 FL (ref 80–99)
MONOCYTES # BLD: 1 K/UL (ref 0–1)
MONOCYTES NFR BLD: 9 % (ref 5–13)
NEUTS SEG # BLD: 8 K/UL (ref 1.8–8)
NEUTS SEG NFR BLD: 72 % (ref 32–75)
NRBC # BLD: 0 K/UL (ref 0–0.01)
NRBC BLD-RTO: 0 PER 100 WBC
PLATELET # BLD AUTO: 227 K/UL (ref 150–400)
PMV BLD AUTO: 9.3 FL (ref 8.9–12.9)
RBC # BLD AUTO: 2.54 M/UL (ref 3.8–5.2)
WBC # BLD AUTO: 11.1 K/UL (ref 3.6–11)

## 2021-06-25 PROCEDURE — 94760 N-INVAS EAR/PLS OXIMETRY 1: CPT

## 2021-06-25 PROCEDURE — 74011250637 HC RX REV CODE- 250/637: Performed by: INTERNAL MEDICINE

## 2021-06-25 PROCEDURE — 99233 SBSQ HOSP IP/OBS HIGH 50: CPT | Performed by: INTERNAL MEDICINE

## 2021-06-25 PROCEDURE — 74011250636 HC RX REV CODE- 250/636: Performed by: STUDENT IN AN ORGANIZED HEALTH CARE EDUCATION/TRAINING PROGRAM

## 2021-06-25 PROCEDURE — 36415 COLL VENOUS BLD VENIPUNCTURE: CPT

## 2021-06-25 PROCEDURE — 85025 COMPLETE CBC W/AUTO DIFF WBC: CPT

## 2021-06-25 PROCEDURE — 74011250637 HC RX REV CODE- 250/637: Performed by: STUDENT IN AN ORGANIZED HEALTH CARE EDUCATION/TRAINING PROGRAM

## 2021-06-25 RX ORDER — OXYCODONE HYDROCHLORIDE 5 MG/1
5 TABLET ORAL
Qty: 30 TABLET | Refills: 0 | Status: SHIPPED | OUTPATIENT
Start: 2021-06-25 | End: 2021-06-28

## 2021-06-25 RX ADMIN — Medication 10 ML: at 07:59

## 2021-06-25 RX ADMIN — OXYCODONE 5 MG: 5 TABLET ORAL at 13:51

## 2021-06-25 RX ADMIN — POLYETHYLENE GLYCOL 3350 17 G: 17 POWDER, FOR SOLUTION ORAL at 10:01

## 2021-06-25 RX ADMIN — ENOXAPARIN SODIUM 30 MG: 30 INJECTION SUBCUTANEOUS at 10:01

## 2021-06-25 RX ADMIN — LEVOTHYROXINE SODIUM 50 MCG: 0.05 TABLET ORAL at 07:59

## 2021-06-25 RX ADMIN — Medication 1 TABLET: at 13:51

## 2021-06-25 RX ADMIN — HYDRALAZINE HYDROCHLORIDE 5 MG: 10 TABLET, FILM COATED ORAL at 10:01

## 2021-06-25 RX ADMIN — Medication 10 ML: at 13:52

## 2021-06-25 RX ADMIN — ACETAMINOPHEN 1000 MG: 500 TABLET, FILM COATED ORAL at 07:59

## 2021-06-25 RX ADMIN — DOCUSATE SODIUM 50MG AND SENNOSIDES 8.6MG 1 TABLET: 8.6; 5 TABLET, FILM COATED ORAL at 10:01

## 2021-06-25 RX ADMIN — Medication 1 TABLET: at 10:01

## 2021-06-25 RX ADMIN — ACETAMINOPHEN 1000 MG: 500 TABLET, FILM COATED ORAL at 13:51

## 2021-06-25 RX ADMIN — PANTOPRAZOLE SODIUM 40 MG: 40 TABLET, DELAYED RELEASE ORAL at 07:59

## 2021-06-25 NOTE — PROGRESS NOTES
FEMUR DISTAL OPEN REDUCTION INTERNAL FIXATION DAILY NOTE    POD  3 Day Post-Op s/p Procedure(s): FEMUR DISTAL OPEN REDUCTION INTERNAL FIXATION     ASSESSMENT / PLAN : Patient is a 24-year-old severely demented female status post ORIF of the distal femur. 1. Pain Control : Excellent - Able to sleep and participate with therapy  2. Wound or incisional issue : Healing incision with small areas of intermittent bloody drainage  3. Therapy / Weight Bearing Recommendations : Weight-bear with transfers only. Otherwise nonweightbearing. Range of motion as tolerated. 4. DVT Prophylaxis :  Lovenox and mechanical lower extremity compression device  5. Disposition : Per CM notes and overnight nurse patient will be discharged to hospice/palliative care. We will continue to follow at a distance. 6. Anemia: Hemoglobin is 7.3 will continue to monitor. 7. Edematous: We will defer to medicine team for fluid control. SUBJECTIVE :     Overnight nurse, patient was becoming combative otherwise JOHANNY overnight. Pain is now controlled. OBJECTIVE :     Vitals:    06/24/21 1544 06/24/21 1958 06/24/21 2329 06/25/21 0749   BP: 112/71 117/65 106/62 (!) 149/72   Pulse: 80 71 65 72   Resp: 18 18  18   Temp: 97.9 °F (36.6 °C) 97.8 °F (36.6 °C) 97.7 °F (36.5 °C) 96.8 °F (36 °C)   SpO2: 94% 92% 97% 95%   Weight:       Height:         Patient obtunded  Not oriented to place or time. right exam of the knee reveals that the dressing is clean, dry and intact. The patient has + quadriceps, ankle DF/PF, EHL/FHL  Sensation is intact to light touch distally  No calf pain. Labs:  Recent Labs     06/25/21  0240 06/24/21  0222 06/24/21  0222   HGB 7.3*   < > 7.4*   HCT 24.3*   < > 22.7*   NA  --   --  139   K  --   --  4.5   CL  --   --  108   CO2  --   --  28   BUN  --   --  25*   CREA  --   --  0.94   GLU  --   --  106*    < > = values in this interval not displayed. Patient mobility                 Izabela Liu.  Karie Buenrostro, SHREYAS  Supervising Physician: Dr. Annmarie Hendrickson.  [address] [place] [address] [phone]  Medical Staff : Lamonte Villavicencio  Office : [phone]

## 2021-06-25 NOTE — DISCHARGE SUMMARY
Discharge Summary       PATIENT ID: Ki Morfin  MRN: [id number]   YOB: 1930    DATE OF ADMISSION: 6/22/2021  5:45 AM    DATE OF DISCHARGE: 06/25/21   PRIMARY CARE PROVIDER: Aaron Johnson DO     DISCHARGING PROVIDER: Fritz Alvarez MD      CONSULTATIONS: IP CONSULT TO ORTHOPEDIC SURGERY  IP CONSULT TO PALLIATIVE CARE - PROVIDER    PROCEDURES/SURGERIES: Procedure(s): FEMUR DISTAL OPEN REDUCTION INTERNAL FIXATION    ADMITTING [facility] COURSE:     Right femur fracture  -fracture reduced in ED, ortho consulted, s/p ORIF 6/22  -tramadol, IV morphine prn for pain  -CM for discharge planning  -dc hospice     Acute blood loss anemia  -likely from surgery  -s/p 1 unit PRBC  -cont to monitor     Pelvic prolapse  -seems POA  -hold off on gyn consult as may be going to DC on hospice     Hypothyroidism  -cont LT4     GERD  -cont protonix     HTN  -cont hydralazine        PENDING TEST RESULTS:   At the time of discharge the following test results are still pending: none    FOLLOW UP APPOINTMENTS:    Follow-up Information     Follow up With Specialties Details Why Contact Info    Aaron Johnson DO Internal Medicine  Hospital discharge follow up [place] [address]  [phone]               DIET: regular    ACTIVITY: as tolerated      DISCHARGE MEDICATIONS:  Current Discharge Medication List      START taking these medications    Details   oxyCODONE IR (ROXICODONE) 5 mg immediate release tablet Take 1 Tablet by mouth nightly for 3 days. Max Daily Amount: 5 mg. Qty: 30 Tablet, Refills: 0  Start date: 6/25/2021, End date: 6/28/2021    Associated Diagnoses: Other closed fracture of right femur, unspecified portion of femur, [facility]         CONTINUE these medications which have NOT CHANGED    Details   acetaminophen (TYLENOL) 650 mg suppository Insert 650 mg into rectum every four (4) hours as needed for Fever.       atropine 1 % ophthalmic solution 1 Drop by SubLINGual route every two (2) hours as needed (secretions). bisacodyL (DULCOLAX) 10 mg supp Insert 10 mg into rectum daily as needed for Constipation. loperamide (IMMODIUM) 2 mg tablet Take 2 mg by mouth four (4) times daily as needed for Diarrhea. LORazepam (LORazepam IntensoL) 2 mg/mL concentrated solution Take 0.5 mg by mouth every four (4) hours as needed for Anxiety. morphine (ROXANOL) 100 mg/5 mL (20 mg/mL) concentrated solution Take 5 mg by mouth every four (4) hours as needed for Pain or Shortness of Breath.      pantoprazole (Protonix) 40 mg tablet Take 40 mg by mouth daily. dimethicone (REMEDY DIMETHICONE) 5 % topical cream Apply  to affected area two (2) times a day. Apply to buttocks      !! acetaminophen (TYLENOL) 500 mg tablet Take 500 mg by mouth nightly. Crush and place in apple sauce   Indications: pain      hydrALAZINE (APRESOLINE) 10 mg tablet Take 0.5 Tabs by mouth two (2) times a day. Qty: 90 Tab, Refills: 0      levothyroxine (SYNTHROID) 50 mcg tablet Take 1 Tab by mouth Daily (before breakfast). Qty: 30 Tab, Refills: 0      !! acetaminophen (TylenoL) 325 mg tablet Take 2 Tabs by mouth every six (6) hours as needed for Fever. Qty: 30 Tab, Refills: 0       !! - Potential duplicate medications found. Please discuss with provider. NOTIFY YOUR PHYSICIAN FOR ANY OF THE FOLLOWING:   Fever over 101 degrees for 24 hours. Chest pain, shortness of breath, fever, chills, nausea, vomiting, diarrhea, change in mentation, falling, weakness, bleeding. Severe pain or pain not relieved by medications. Or, any other signs or symptoms that you may have questions about.     DISPOSITION:    Home With:   OT  PT  HH  RN       Long term SNF/Inpatient Rehab    Independent/assisted living   x Hospice    Other:       PATIENT CONDITION AT DISCHARGE:     Functional status   x Poor     Deconditioned     Independent      Cognition     Lucid     Forgetful    x Dementia Catheters/lines (plus indication)    Capps     PICC     PEG    x None      Code status     Full code    x DNR      PHYSICAL EXAMINATION AT DISCHARGE:  Constitutional:  No acute distress   ENT:  Oral mucosa moist, oropharynx benign. Resp:  CTA bilaterally. No wheezing/rhonchi/rales. No accessory muscle use   CV:  Regular rhythm, normal rate, no murmurs, gallops, rubs    GI:  Soft, non distended, non tender. normoactive bowel sounds, no hepatosplenomegaly     Musculoskeletal:  No edema, warm, 2+ pulses throughout    Neurologic:  Moves all extremities.   AAOx3, CN II-XII reviewed                           Psych:  No insight      CHRONIC MEDICAL DIAGNOSES:  Problem List as of 6/25/2021 Date Reviewed: 4/18/2021        Codes Class Noted - Resolved    Femur fracture ([facility] ICD-10-CM: S72.90XA  ICD-9-CM: 821.00  6/22/2021 - Present        Coffee ground emesis ICD-10-CM: K92.0  ICD-9-CM: 578.0  4/18/2021 - Present        Acute GI bleeding ICD-10-CM: K92.2  ICD-9-CM: 578.9  4/18/2021 - Present        HTN (hypertension), benign ICD-10-CM: I10  ICD-9-CM: 401.1  4/18/2021 - Present        Hypothyroidism ICD-10-CM: E03.9  ICD-9-CM: 244.9  4/18/2021 - Present        Arthritis ICD-10-CM: M19.90  ICD-9-CM: 716.90  4/18/2021 - Present        Underweight ICD-10-CM: R63.6  ICD-9-CM: 783.22  4/18/2021 - Present              Greater than 35 minutes were spent with the patient on counseling and coordination of care    Signed:   Cely To MD  6/25/2021  10:55 AM

## 2021-06-25 NOTE — CONSULTS
Palliative Medicine Consult  Todd: [phone] ([phone])    Patient Name: Antonio Omalley  YOB: 1930    Date of Initial Consult: 6/23/2021  Reason for Consult: care decisions  Requesting Provider: Dr. Everton De Diso  Primary Care Physician: Yue Encarnacion DO     SUMMARY:   Antonio Omalley is a 80 y.o. with a past history of dementia, 2nd degree AV block, hypothyroidism, hearing loss, R TKR, GI bleed April 2021, who was admitted on 6/22/2021 from home with a diagnosis of fall with R femur fracture. Current medical issues leading to Palliative Medicine involvement include: Patient with new distal R femur fracture s/p ORIF 6/22/21, dementia, hearing impaired, debility. We are asked to discuss care goals with patient's son on 6/24/21. Patient [facility] at [facility]. Son Jayy Calle H: [phone]    DNR order on chart  No ACP documents on chart     PALLIATIVE DIAGNOSES:   1. R femur fx, s/p ORIF on 6/22/21  2. Post op pain  3. Anemia, acute blood loss, (transfused 1 unit 6/22)  Hb 7.4 today  4. Dementia with progressive functional decline  5. Skin breakdown POA (R and L dorsal foot, sacrum)  6. Debility  7. Hearing impaired  8. Dysphagia ? Post intubation inflammation- improving  9. hypoalbuminemia       PLAN:   1. Call returned to patient's son Jayy Calle. Recommend that she return to her [facility] with hospice supports. Explained why she is not going to benefit from PT/OT -- she is not able to participate and it is burdensome to her/ agitates her. Recommend goal of care be comfort and Kalia Royal is in agreement with this. He is satisfied with hospice services that patient had prior to admission. She may heal this fracture but overall she is going to have continued decline and this is a big setback.   She has poor appetite and intake for quite some time, explain how protein calorie malnutrition leads to edema/ skin breakdown etc.   Patient often not open to any kind of encouragement, son describes her as stubborn her whole life. Recommend letting her be the boss, decide about when and if she wants to get out of bed. Also, recommend that OB/GYn appointment for pelvic prolapse is not necessary at this time as she is going to have hospice support. Recommend careful attention to hygiene to prevent UTI as possible. Son in agreement with hospice services at time of discharge. Will sign off, please call as needed [phone]     GOALS OF CARE / TREATMENT PREFERENCES:     GOALS OF CARE:  Patient/Health Care Proxy Stated Goals: Rehabilitation    TREATMENT PREFERENCES:   Code Status: DNR    Advance Care Planning:  [] [facility] Interdisciplinary Team has updated the ACP Navigator with Health Care Decision Maker and Patient Capacity      Primary Decision Maker: Nura Tristan - Child - [phone]    Primary Decision Maker: Jony Chua Daughter-in-Law - [phone]  Advance Care Planning 6/23/2021   Confirm Advance Directive Yes, on file       Medical Interventions: Limited additional interventions     Other Instructions: Other:    As far as possible, the palliative care team has discussed with patient / health care proxy about goals of care / treatment preferences for patient. HISTORY:     History obtained from:   chart  CHIEF COMPLAINT: fall out of bed with leg fracture    HPI/SUBJECTIVE:    The patient is:   [x] Verbal and participatory  [] Non-participatory due to:     80year old female admitted with fall out of bed resulting in leg fracture. She is a poor historian and unable to supply history.         Clinical Pain Assessment (nonverbal scale for severity on nonverbal patients):   Clinical Pain Assessment  Severity: 0     Activity (Movement): Seeking attention through movement or slow, cautious movement    Duration: for how long has pt been experiencing pain (e.g., 2 days, 1 month, years)  Frequency: how often pain is an issue (e.g., several times per day, once every few days, constant)     FUNCTIONAL ASSESSMENT:     Palliative Performance Scale (PPS):  PPS: 30       PSYCHOSOCIAL/SPIRITUAL SCREENING:     Palliative IDT has assessed this patient for cultural preferences / practices and a referral made as appropriate to needs (Cultural Services, Patient Advocacy, Ethics, etc.)    Any spiritual / Yarsani concerns:  [] Yes /  [x] No    Caregiver Burnout:  [] Yes /  [x] No /  [] No Caregiver Present      Anticipatory grief assessment:   [x] Normal  / [] Maladaptive       ESAS Anxiety: Anxiety: 0    ESAS Depression: Depression: 0        REVIEW OF SYSTEMS:     Positive and pertinent negative findings in ROS are noted above in HPI. The following systems were [x] reviewed / [] unable to be reviewed as noted in HPI  Other findings are noted below. Systems: constitutional, ears/nose/mouth/throat, respiratory, gastrointestinal, genitourinary, musculoskeletal, integumentary, neurologic, psychiatric, endocrine. Positive findings noted below. Modified ESAS Completed by: provider   Fatigue: 0 Drowsiness: 0   Depression: 0 Pain: 0   Anxiety: 0 Nausea: 0   Anorexia: 0 Dyspnea: 0     Constipation: No     Stool Occurrence(s): 1        PHYSICAL EXAM:     From RN flowsheet:  Wt Readings from Last 3 Encounters:   06/22/21 44 kg (96 lb 14.4 oz)   04/20/21 36.3 kg (80 lb)     Blood pressure (!) 149/72, pulse 72, temperature 96.8 °F (36 °C), resp. rate 18, height 4' 8\" (1.422 m), weight 44 kg (96 lb 14.4 oz), SpO2 95 %.     Pain Scale 1: Adult Nonverbal Pain Scale  Pain Intensity 1: 2     Pain Location 1: Leg        Pain Intervention(s) 1: Medication (see MAR) (immobilizing leg )  Last bowel movement, if known:     Constitutional: sleeping, NAD  Elderly frail appearing    Breakfast tray ;  100% oatmeal eaten, few bits of eggs     HISTORY:     Active Problems:    Femur fracture ([address] (6/22/2021)      Past Medical History:   Diagnosis Date    Arthritis     Hypertension     Hypothyroidism       Past Surgical History:   Procedure Laterality Date    HX CHOLECYSTECTOMY      HX GYN      partial hysterectomy, uterine prolapse    HX HEENT      cataract    HX ORTHOPAEDIC Right     knee replacement      Family History   Problem Relation Age of Onset    Heart Disease Neg Hx       History reviewed, no pertinent family history.   Social History     Tobacco Use    Smoking status: Never Smoker    Smokeless tobacco: Never Used   Substance Use Topics    Alcohol use: Not Currently     Allergies   Allergen Reactions    Pcn [Penicillins] Unknown (comments)    Statins-Hmg-Coa Reductase Inhibitors Unknown (comments)    Warfarin Unknown (comments)      Current Facility-Administered Medications   Medication Dose Route Frequency    oxyCODONE IR (ROXICODONE) tablet 5 mg  5 mg Oral QHS    enoxaparin (LOVENOX) injection 30 mg  30 mg SubCUTAneous DAILY    sodium chloride (NS) flush 5-40 mL  5-40 mL IntraVENous Q8H    sodium chloride (NS) flush 5-40 mL  5-40 mL IntraVENous PRN    naloxone (NARCAN) injection 0.4 mg  0.4 mg IntraVENous PRN    calcium-vitamin D (OS-SAADIA +D3) 500 mg-200 unit per tablet 1 Tablet  1 Tablet Oral TID WITH MEALS    senna-docusate (PERICOLACE) 8.6-50 mg per tablet 1 Tablet  1 Tablet Oral BID    polyethylene glycol (MIRALAX) packet 17 g  17 g Oral DAILY    bisacodyL (DULCOLAX) suppository 10 mg  10 mg Rectal DAILY PRN    oxyCODONE IR (ROXICODONE) tablet 5 mg  5 mg Oral Q4H PRN    acetaminophen (TYLENOL) tablet 1,000 mg  1,000 mg Oral Q8H    0.9% sodium chloride infusion  75 mL/hr IntraVENous PRN    sodium chloride (NS) flush 5-40 mL  5-40 mL IntraVENous Q8H    sodium chloride (NS) flush 5-40 mL  5-40 mL IntraVENous PRN    acetaminophen (TYLENOL) tablet 650 mg  650 mg Oral Q6H PRN    Or    acetaminophen (TYLENOL) suppository 650 mg  650 mg Rectal Q6H PRN    polyethylene glycol (MIRALAX) packet 17 g  17 g Oral DAILY PRN    ondansetron (ZOFRAN ODT) tablet 4 mg  4 mg Oral Q8H PRN    Or    ondansetron [facility]) injection 4 mg  4 mg IntraVENous Q6H PRN    hydrALAZINE (APRESOLINE) tablet 5 mg  5 mg Oral BID    levothyroxine (SYNTHROID) tablet 50 mcg  50 mcg Oral ACB    pantoprazole (PROTONIX) tablet 40 mg  40 mg Oral ACB&D    traMADoL (ULTRAM) tablet 50 mg  50 mg Oral Q6H PRN    morphine injection 2 mg  2 mg IntraVENous Q4H PRN          LAB AND IMAGING FINDINGS:     Lab Results   Component Value Date/Time    WBC 11.1 (H) 06/25/2021 02:40 AM    HGB 7.3 (L) 06/25/2021 02:40 AM    PLATELET 873 24/48/9130 02:40 AM     Lab Results   Component Value Date/Time    Sodium 139 06/24/2021 02:22 AM    Potassium 4.5 06/24/2021 02:22 AM    Chloride 108 06/24/2021 02:22 AM    CO2 28 06/24/2021 02:22 AM    BUN 25 (H) 06/24/2021 02:22 AM    Creatinine 0.94 06/24/2021 02:22 AM    Calcium 7.3 (L) 06/24/2021 02:22 AM      Lab Results   Component Value Date/Time    Alk. phosphatase 89 06/22/2021 06:07 AM    Protein, total 5.2 (L) 06/22/2021 06:07 AM    Albumin 2.1 (L) 06/22/2021 06:07 AM    Globulin 3.1 06/22/2021 06:07 AM     Lab Results   Component Value Date/Time    INR 1.1 04/26/2021 03:55 PM    Prothrombin time 11.0 04/26/2021 03:55 PM    aPTT 23.1 04/26/2021 03:55 PM      Lab Results   Component Value Date/Time    Iron 24 (L) 04/18/2021 11:26 AM    TIBC 226 (L) 04/18/2021 11:26 AM    Iron % saturation 11 (L) 04/18/2021 11:26 AM      No results found for: PH, PCO2, PO2  No components found for: [place]   Lab Results   Component Value Date/Time    CK 28 04/23/2021 08:30 AM                Total time: 35  Counseling / coordination time, spent as noted above: 25  > 50% counseling / coordination?: yes    Prolonged service was provided for  []30 min   []75 min in face to face time in the presence of the patient, spent as noted above. Time Start:   Time End:   Note: this can only be billed with 02334 (initial) or 54580 (follow up). If multiple start / stop times, list each separately.

## 2021-06-25 NOTE — PROGRESS NOTES
Upon shift assessment, labia found to be extremely swollen and hard. Skin is blanchable and is not tender to the touch. MD notified of finding. Will continue to monitor. 0330: Pt extremely angry, cursing at staff, flailing arms. Pt refused vital signs and stated \"get the hell out of my room. \"    Pt's mood has not changed. Unable to get morning vital signs. Will notify day shift RN.

## 2021-06-25 NOTE — ACP (ADVANCE CARE PLANNING)
Primary Decision Maker: La Parks - Child - [phone]  Primary Decision Maker: Farideh Mi - Daughter  Advance Care Planning 6/25/2021   Patient's Healthcare Decision Maker is: Legal Next of Kin   Confirm Advance Directive Not on file     Pt has $POA document on file dated 4/6/2021 which appoints son Reyes Bustamante and dtr Alessandronylucio Monk as primary and secondary financial POAs in respective order; however, document does not authorize medical decision making. In absence of verified Medical POA, pt's adult children are legal NOK and surrogate decision makers. Pt has inpt DNR order in place, does not currently have DDNR on file. Pt will be returning to [facility] today with support from AT [facility].

## 2021-06-25 NOTE — CONSULTS
Palliative    Spoke with son Rosa Painting this morning. He is agreeable to his mom returning to [facility] with hospice care.   Full note to follow

## 2021-06-25 NOTE — PALLIATIVE CARE DISCHARGE
The Palliative Medicine team was consulted as part of your / your loved one's care in the hospital. Our team is a supportive service; we strive to relieve suffering and improve quality of life. You identified the following goal(s) as your main focus for healthcare: Patient/Health Care Proxy Stated Goals: Comfort. You will have support from AT [facility] in place. We reviewed advance care planning information, which includes the following:  Advance Care Planning 6/25/2021   Patient's Healthcare Decision Maker is: Legal Next of Kin   Confirm Advance Directive Not on file       We reviewed / discussed your code status as: DNR     Full Code means perform CPR in the event of cardiac arrest     [facility] means do NOT perform CPR in the event of cardiac arrest     Partial Code means you have specific preferences, please discuss with your health care team     No Order means this issue was not addressed / resolved during your stay    Because of the importance of this information, we are providing you with a printed copy to share with other healthcare providers after this hospitalization is complete. If any of the above information is incomplete or incorrect, please contact the Palliative Medicine team at [phone].

## 2021-06-25 NOTE — PROGRESS NOTES
[address] Adult  Hospitalist Group                                                                                          Hospitalist Progress Note  Nolvia Tirado MD        Date of Service:  2021  NAME:  Hector Berger  :  1930  MRN:  [id number]      Admission Summary:       Interval history / Subjective:    pleasantly confused currently, but nursing reports patient has been confused, agitated and verbally aggressive. Assessment & Plan:     Right femur fracture  -fracture reduced in ED, ortho consulted, s/p ORIF   -tramadol, IV morphine prn for pain  -CM for discharge planning  -plan SNF vs hospice, palliative having [address] discussions with family    Acute blood loss anemia  -likely from surgery  -s/p 1 unit PRBC  -cont to monitor    Pelvic prolapse  -seems POA  -hold off on gyn consult as may be going to [place] on hospice    Hypothyroidism  -cont LT4     GERD  -cont protonix     HTN  -BP has been soft, hold hydralazine for now    Code status: DNR  DVT prophylaxis: [facility] Problems  Date Reviewed: 2021        Codes Class Noted POA    Femur fracture ([facility] [address] ICD-10-CM: E97.04RL  ICD-9-CM: 821.00  2021 Unknown                Review of Systems:   A comprehensive review of systems was negative except for that written in the HPI. Vital Signs:    Last 24hrs VS reviewed since prior progress note. Most recent are:  Visit Vitals  BP (!) 149/72 (BP 1 Location: Left arm, BP Patient Position: At rest)   Pulse 72   Temp 96.8 °F (36 °C)   Resp 18   Ht 4' 8\" (1.422 m)   Wt 44 kg (96 lb 14.4 oz)   SpO2 95%   BMI 21.72 kg/m²         Intake/Output Summary (Last 24 hours) at 2021 7213  Last data filed at 2021 0440  Gross per 24 hour   Intake 1620 ml   Output    Net 1620 ml        Physical Examination:             Constitutional:  No acute distress   ENT:  Oral mucosa moist, oropharynx benign. Resp:  CTA bilaterally. No wheezing/rhonchi/rales.  No accessory muscle use   CV:  Regular rhythm, normal rate, no murmurs, gallops, rubs    GI:  Soft, non distended, non tender. normoactive bowel sounds, no hepatosplenomegaly     Musculoskeletal:  No edema, warm, 2+ pulses throughout    Neurologic:  Moves all extremities. AAOx3, CN II-XII reviewed     Psych:  No insight       Data Review:    Review and/or order of clinical lab test      Labs:     Recent Labs     06/25/21  0240 06/24/21  0222   WBC 11.1* 10.8   HGB 7.3* 7.4*   HCT 24.3* 22.7*    208     Recent Labs     06/24/21  0222 06/23/21  0218 06/22/21  1832    138 140   K 4.5 4.6 4.9    107 109*   CO2 28 24 27   BUN 25* 16 16   CREA 0.94 0.51* 0.40*   * 103* 112*   CA 7.3* 7.2* 6.9*     No results for input(s): ALT, AP, TBIL, TBILI, TP, ALB, GLOB, GGT, AML, LPSE in the last 72 hours. No lab exists for component: SGOT, GPT, AMYP, HLPSE  No results for input(s): INR, PTP, APTT, INREXT, INREXT in the last 72 hours. No results for input(s): FE, TIBC, PSAT, FERR in the last 72 hours. Lab Results   Component Value Date/Time    Folate 6.8 04/18/2021 11:26 AM      No results for input(s): PH, PCO2, PO2 in the last 72 hours. No results for input(s): CPK, CKNDX, TROIQ in the last 72 hours.     No lab exists for component: CPKMB  No results found for: CHOL, CHOLX, CHLST, CHOLV, HDL, HDLP, LDL, LDLC, DLDLP, TGLX, TRIGL, TRIGP, CHHD, CHHDX  Lab Results   Component Value Date/Time    Glucose (POC) 125 (H) 06/22/2021 06:17 PM    Glucose (POC) 113 (H) 04/21/2021 04:06 PM    Glucose (POC) 127 (H) 04/21/2021 12:30 PM    Glucose (POC) 104 (H) 04/20/2021 09:28 PM    Glucose (POC) 107 (H) 04/20/2021 05:12 PM     Lab Results   Component Value Date/Time    Color YELLOW/STRAW 04/23/2021 04:09 AM    Appearance CLEAR 04/23/2021 04:09 AM    Specific gravity 1.017 04/23/2021 04:09 AM    Specific gravity 1.015 04/18/2021 10:00 AM    pH (UA) 5.0 04/23/2021 04:09 AM    Protein Negative 04/23/2021 04:09 AM    Glucose Negative 04/23/2021 04:09 AM    Ketone Negative 04/23/2021 04:09 AM    Bilirubin Negative 04/23/2021 04:09 AM    Urobilinogen 0.2 04/23/2021 04:09 AM    Nitrites Negative 04/23/2021 04:09 AM    Leukocyte Esterase SMALL (A) 04/23/2021 04:09 AM    Epithelial cells FEW 04/23/2021 04:09 AM    Bacteria Negative 04/23/2021 04:09 AM    WBC 0-4 04/23/2021 04:09 AM    RBC 0-5 04/23/2021 04:09 AM         Medications Reviewed:     Current Facility-Administered Medications   Medication Dose Route Frequency    oxyCODONE IR (ROXICODONE) tablet 5 mg  5 mg Oral QHS    enoxaparin (LOVENOX) injection 30 mg  30 mg SubCUTAneous DAILY    sodium chloride (NS) flush 5-40 mL  5-40 mL IntraVENous Q8H    sodium chloride (NS) flush 5-40 mL  5-40 mL IntraVENous PRN    naloxone (NARCAN) injection 0.4 mg  0.4 mg IntraVENous PRN    calcium-vitamin D (OS-SAADIA +D3) 500 mg-200 unit per tablet 1 Tablet  1 Tablet Oral TID WITH MEALS    senna-docusate (PERICOLACE) 8.6-50 mg per tablet 1 Tablet  1 Tablet Oral BID    polyethylene glycol (MIRALAX) packet 17 g  17 g Oral DAILY    bisacodyL (DULCOLAX) suppository 10 mg  10 mg Rectal DAILY PRN    oxyCODONE IR (ROXICODONE) tablet 5 mg  5 mg Oral Q4H PRN    acetaminophen (TYLENOL) tablet 1,000 mg  1,000 mg Oral Q8H    0.9% sodium chloride infusion  75 mL/hr IntraVENous PRN    sodium chloride (NS) flush 5-40 mL  5-40 mL IntraVENous Q8H    sodium chloride (NS) flush 5-40 mL  5-40 mL IntraVENous PRN    acetaminophen (TYLENOL) tablet 650 mg  650 mg Oral Q6H PRN    Or    acetaminophen (TYLENOL) suppository 650 mg  650 mg Rectal Q6H PRN    polyethylene glycol (MIRALAX) packet 17 g  17 g Oral DAILY PRN    ondansetron (ZOFRAN ODT) tablet 4 mg  4 mg Oral Q8H PRN    Or    ondansetron (ZOFRAN) injection 4 mg  4 mg IntraVENous Q6H PRN    0.9% sodium chloride infusion  75 mL/hr IntraVENous CONTINUOUS    hydrALAZINE (APRESOLINE) tablet 5 mg  5 mg Oral BID    levothyroxine (SYNTHROID) tablet 50 mcg  50 mcg Oral ACB    pantoprazole (PROTONIX) tablet 40 mg  40 mg Oral ACB&D    traMADoL (ULTRAM) tablet 50 mg  50 mg Oral Q6H PRN    morphine injection 2 mg  2 mg IntraVENous Q4H PRN     ______________________________________________________________________  EXPECTED LENGTH OF STAY: 6d 4h  ACTUAL LENGTH OF STAY:          119 Nicole Barroso MD

## 2021-06-25 NOTE — PROGRESS NOTES
6/25/2021   11:27 AM  RUR:  21%  Risk Level: []Low [x]Moderate []High  Value-based purchasing: [] Yes [] No  Bundle patient: [] Yes [x] No   Specify:     Transition of care plan:  1. Discharge order submitted. 2. Return to [facility] at [facility] with At [address] spoke with pt's son, Tommy Morales [phone], via p/c who expressed she wished for pt to return to [facility] at [facility] with At [address] CM spoke with Shiraz Rothman with [facility] at [facility], and they are able to accept pt today. Nursing, please call report to Shiraz Rothman at the [facility] at [phone]. DC clinicals faxed to [facility] at [phone]. CM submitted resumption of care order to At [address] via [employer], and they accepted and can admit today. 3. Pt's son notified of pt's Medicare rights via p/c, and expressed an understanding. Paper copy included in packet. 4. Hospice to manage outpatient care. 5. [facility] ambulance transport arranged for 1:00-1:30pm. Packet in chart, and PCS form attached in [employer]. Care Management Interventions  PCP Verified by CM: Yes Jade Bryant  Palliative Care Criteria Met (RRAT>21 & CHF Dx)?: No  Palliative Consult Recommended?: Yes  Mode of Transport at Discharge:  Other (see comment) (Family)  Transition of Care Consult (CM Consult): Discharge Planning  Discharge Durable Medical Equipment: No  Physical Therapy Consult: Yes  Occupational Therapy Consult: Yes  Speech Therapy Consult: Yes  Current Support Network: Assisted Living ([facility])  Confirm Follow Up Transport: Other (see comment) (BLS)  The Plan for Transition of Care is Related to the Following Treatment Goals : Femur fracture  The Patient and/or Patient Representative was Provided with a Choice of Provider and Agrees with the Discharge Plan?: (S) Yes  Name of the Patient Representative Who was Provided with a Choice of Provider and Agrees with the Discharge Plan: Self/family  Freedom of Choice List was Provided with Basic Dialogue that Supports the Patient's Individualized Plan of Care/Goals, Treatment Preferences and Shares the Quality Data Associated with the Providers?: (S) Yes  Discharge Location  Discharge Placement: Home with hospice

## 2021-06-26 NOTE — PROGRESS NOTES
1232.  Report called to [facility] at [facility]. Spoke with  Virgie Aus. 1425. Discharge via stretcher by [facility]. No family available for discharge instructions. Copy place in patient's belongs bag.

## 2022-03-18 PROBLEM — M19.90 ARTHRITIS: Status: ACTIVE | Noted: 2021-04-18

## 2022-03-18 PROBLEM — I10 HTN (HYPERTENSION), BENIGN: Status: ACTIVE | Noted: 2021-04-18

## 2022-03-18 PROBLEM — S72.90XA FEMUR FRACTURE (HCC): Status: ACTIVE | Noted: 2021-06-22

## 2022-03-18 PROBLEM — E03.9 HYPOTHYROIDISM: Status: ACTIVE | Noted: 2021-04-18

## 2022-03-19 PROBLEM — K92.0 COFFEE GROUND EMESIS: Status: ACTIVE | Noted: 2021-04-18

## 2022-03-19 PROBLEM — K92.2 ACUTE GI BLEEDING: Status: ACTIVE | Noted: 2021-04-18

## 2022-03-20 PROBLEM — R63.6 UNDERWEIGHT: Status: ACTIVE | Noted: 2021-04-18

## 2023-05-16 RX ORDER — HYDRALAZINE HYDROCHLORIDE 10 MG/1
5 TABLET, FILM COATED ORAL 2 TIMES DAILY
COMMUNITY
Start: 2021-04-25

## 2023-05-16 RX ORDER — ACETAMINOPHEN 650 MG/1
650 SUPPOSITORY RECTAL EVERY 4 HOURS PRN
COMMUNITY

## 2023-05-16 RX ORDER — ACETAMINOPHEN 325 MG/1
650 TABLET ORAL EVERY 6 HOURS PRN
COMMUNITY
Start: 2021-04-23

## 2023-05-16 RX ORDER — ATROPINE SULFATE 10 MG/ML
1 SOLUTION/ DROPS OPHTHALMIC
COMMUNITY

## 2023-05-16 RX ORDER — PANTOPRAZOLE SODIUM 40 MG/1
40 TABLET, DELAYED RELEASE ORAL DAILY
COMMUNITY

## 2023-05-16 RX ORDER — LOPERAMIDE HYDROCHLORIDE 2 MG/1
2 TABLET ORAL 4 TIMES DAILY PRN
COMMUNITY

## 2023-05-16 RX ORDER — LORAZEPAM 2 MG/ML
0.5 CONCENTRATE ORAL EVERY 4 HOURS PRN
COMMUNITY

## 2023-05-16 RX ORDER — ACETAMINOPHEN 500 MG
500 TABLET ORAL
COMMUNITY

## 2023-05-16 RX ORDER — BISACODYL 10 MG
10 SUPPOSITORY, RECTAL RECTAL DAILY PRN
COMMUNITY

## 2023-05-16 RX ORDER — LEVOTHYROXINE SODIUM 0.05 MG/1
50 TABLET ORAL
COMMUNITY
Start: 2021-04-23

## 2023-05-16 RX ORDER — MORPHINE SULFATE 100 MG/5ML
5 SOLUTION ORAL EVERY 4 HOURS PRN
COMMUNITY

## (undated) DEVICE — SOL IRR SOD CL 0.9% 1000ML BTL --

## (undated) DEVICE — BASIC GENERAL-SFMC: Brand: MEDLINE INDUSTRIES, INC.

## (undated) DEVICE — Device

## (undated) DEVICE — PAD NON-ADHERENT 3X4 STRL LF --

## (undated) DEVICE — TOWEL,OR,DSP,ST,BLUE,STD,4/PK,20PK/CS: Brand: MEDLINE

## (undated) DEVICE — BIT DRL L365MM DIA4.3MM CALIB NONRADIOLUCENT W/O STP DISP

## (undated) DEVICE — 3M™ TEGADERM™ TRANSPARENT FILM DRESSING FRAME STYLE, 1626W, 4 IN X 4-3/4 IN (10 CM X 12 CM), 50/CT 4CT/CASE: Brand: 3M™ TEGADERM™

## (undated) DEVICE — KIT POS FOAM HANA TBL

## (undated) DEVICE — STERILE POLYISOPRENE POWDER-FREE SURGICAL GLOVES: Brand: PROTEXIS

## (undated) DEVICE — C-ARM: Brand: UNBRANDED

## (undated) DEVICE — DRESSING FOAM W4XL10IN AG SIL ADH ANTIMIC POSTOP OPTIFOAM

## (undated) DEVICE — HANDPIECE SET WITH COAXIAL HIGH FLOW TIP AND SUCTION TUBE: Brand: INTERPULSE

## (undated) DEVICE — SUTURE ABSORBABLE BRAIDED 2-0 CT-1 27 IN UD VICRYL J259H

## (undated) DEVICE — SHEET, DRAPE, SPLIT, STERILE: Brand: MEDLINE

## (undated) DEVICE — GOWN,PREVENTION PLUS,XLN/XL,ST,24/CS: Brand: MEDLINE

## (undated) DEVICE — 6619 2 PTNT ISO SYS INCISE AREA&LT;(&GT;&&LT;)&GT;P: Brand: STERI-DRAPE™ IOBAN™ 2

## (undated) DEVICE — DRESSING FOAM W4XL12IN AG SIL ADH ANTIMIC POSTOP OPTIFOAM

## (undated) DEVICE — GDWIRE TIP BEAD 3X98CM --

## (undated) DEVICE — STERILE POLYISOPRENE POWDER-FREE SURGICAL GLOVES WITH EMOLLIENT COATING: Brand: PROTEXIS

## (undated) DEVICE — IMPLANTABLE DEVICE
Type: IMPLANTABLE DEVICE | Site: FEMUR | Status: NON-FUNCTIONAL
Brand: PHOENIX NAIL SYSTEM
Removed: 2021-06-22

## (undated) DEVICE — PREP KIT PEEL PTCH POVIDONE IOD

## (undated) DEVICE — SUTURE VCRL SZ 0 L36IN ABSRB UD L36MM CT-1 1/2 CIR J946H

## (undated) DEVICE — GDWIRE THRD TIP 3.2X460MM COCR --

## (undated) DEVICE — 3M™ STERI-DRAPE™ U-DRAPE 1015: Brand: STERI-DRAPE™